# Patient Record
Sex: MALE | Race: WHITE | NOT HISPANIC OR LATINO | Employment: FULL TIME | URBAN - METROPOLITAN AREA
[De-identification: names, ages, dates, MRNs, and addresses within clinical notes are randomized per-mention and may not be internally consistent; named-entity substitution may affect disease eponyms.]

---

## 2017-02-06 RX ORDER — CITALOPRAM 20 MG/1
TABLET, FILM COATED ORAL
Qty: 90 TABLET | Refills: 3 | OUTPATIENT
Start: 2017-02-06

## 2017-02-06 RX ORDER — METOPROLOL SUCCINATE 50 MG/1
TABLET, EXTENDED RELEASE ORAL
Qty: 90 TABLET | Refills: 3 | OUTPATIENT
Start: 2017-02-06

## 2017-02-17 DIAGNOSIS — Z00.00 ROUTINE GENERAL MEDICAL EXAMINATION AT A HEALTH CARE FACILITY: Primary | ICD-10-CM

## 2017-02-28 RX ORDER — METOPROLOL SUCCINATE 50 MG/1
TABLET, EXTENDED RELEASE ORAL
Qty: 90 TABLET | Refills: 3 | Status: SHIPPED | OUTPATIENT
Start: 2017-02-28 | End: 2018-02-12 | Stop reason: SDUPTHER

## 2017-03-06 ENCOUNTER — CLINICAL SUPPORT (OUTPATIENT)
Dept: REHABILITATION | Facility: HOSPITAL | Age: 57
End: 2017-03-06
Attending: INTERNAL MEDICINE
Payer: COMMERCIAL

## 2017-03-06 ENCOUNTER — CLINICAL SUPPORT (OUTPATIENT)
Dept: INTERNAL MEDICINE | Facility: CLINIC | Age: 57
End: 2017-03-06

## 2017-03-06 ENCOUNTER — CLINICAL SUPPORT (OUTPATIENT)
Dept: CARDIOLOGY | Facility: CLINIC | Age: 57
End: 2017-03-06
Payer: COMMERCIAL

## 2017-03-06 ENCOUNTER — OFFICE VISIT (OUTPATIENT)
Dept: INTERNAL MEDICINE | Facility: CLINIC | Age: 57
End: 2017-03-06
Payer: COMMERCIAL

## 2017-03-06 VITALS
HEART RATE: 72 BPM | DIASTOLIC BLOOD PRESSURE: 80 MMHG | RESPIRATION RATE: 14 BRPM | SYSTOLIC BLOOD PRESSURE: 128 MMHG | BODY MASS INDEX: 24.25 KG/M2 | WEIGHT: 183 LBS | HEIGHT: 73 IN

## 2017-03-06 VITALS
DIASTOLIC BLOOD PRESSURE: 80 MMHG | HEIGHT: 73 IN | TEMPERATURE: 98 F | SYSTOLIC BLOOD PRESSURE: 128 MMHG | RESPIRATION RATE: 14 BRPM | WEIGHT: 183 LBS | BODY MASS INDEX: 24.25 KG/M2 | HEART RATE: 72 BPM

## 2017-03-06 DIAGNOSIS — I10 ESSENTIAL HYPERTENSION: Chronic | ICD-10-CM

## 2017-03-06 DIAGNOSIS — Z00.00 ROUTINE GENERAL MEDICAL EXAMINATION AT A HEALTH CARE FACILITY: Primary | ICD-10-CM

## 2017-03-06 DIAGNOSIS — Z00.00 ROUTINE GENERAL MEDICAL EXAMINATION AT A HEALTH CARE FACILITY: ICD-10-CM

## 2017-03-06 DIAGNOSIS — I25.84 CORONARY ARTERY DISEASE DUE TO CALCIFIED CORONARY LESION: ICD-10-CM

## 2017-03-06 DIAGNOSIS — I25.10 CORONARY ARTERY DISEASE DUE TO CALCIFIED CORONARY LESION: ICD-10-CM

## 2017-03-06 DIAGNOSIS — J98.01 BRONCHOSPASM: ICD-10-CM

## 2017-03-06 DIAGNOSIS — F41.9 ANXIETY: ICD-10-CM

## 2017-03-06 LAB
ALBUMIN SERPL BCP-MCNC: 4 G/DL
ALP SERPL-CCNC: 57 U/L
ALT SERPL W/O P-5'-P-CCNC: 29 U/L
ANION GAP SERPL CALC-SCNC: 10 MMOL/L
AST SERPL-CCNC: 23 U/L
BILIRUB SERPL-MCNC: 0.9 MG/DL
BUN SERPL-MCNC: 17 MG/DL
CALCIUM SERPL-MCNC: 9.3 MG/DL
CHLORIDE SERPL-SCNC: 107 MMOL/L
CHOLEST/HDLC SERPL: 3.3 {RATIO}
CO2 SERPL-SCNC: 27 MMOL/L
COMPLEXED PSA SERPL-MCNC: 0.71 NG/ML
CREAT SERPL-MCNC: 1 MG/DL
ERYTHROCYTE [DISTWIDTH] IN BLOOD BY AUTOMATED COUNT: 11.8 %
EST. GFR  (AFRICAN AMERICAN): >60 ML/MIN/1.73 M^2
EST. GFR  (NON AFRICAN AMERICAN): >60 ML/MIN/1.73 M^2
GLUCOSE SERPL-MCNC: 90 MG/DL
HCT VFR BLD AUTO: 39.7 %
HDL/CHOLESTEROL RATIO: 30.4 %
HDLC SERPL-MCNC: 102 MG/DL
HDLC SERPL-MCNC: 31 MG/DL
HGB BLD-MCNC: 13.7 G/DL
LDLC SERPL CALC-MCNC: 58 MG/DL
MCH RBC QN AUTO: 32.5 PG
MCHC RBC AUTO-ENTMCNC: 34.5 %
MCV RBC AUTO: 94 FL
NONHDLC SERPL-MCNC: 71 MG/DL
PLATELET # BLD AUTO: 210 K/UL
PMV BLD AUTO: 9.8 FL
POTASSIUM SERPL-SCNC: 4.1 MMOL/L
PROT SERPL-MCNC: 6.9 G/DL
RBC # BLD AUTO: 4.22 M/UL
SODIUM SERPL-SCNC: 144 MMOL/L
TRIGL SERPL-MCNC: 65 MG/DL
TSH SERPL DL<=0.005 MIU/L-ACNC: 1.22 UIU/ML
WBC # BLD AUTO: 3.37 K/UL

## 2017-03-06 PROCEDURE — 99396 PREV VISIT EST AGE 40-64: CPT | Mod: S$GLB,,, | Performed by: INTERNAL MEDICINE

## 2017-03-06 PROCEDURE — 85027 COMPLETE CBC AUTOMATED: CPT | Mod: PO

## 2017-03-06 PROCEDURE — 80061 LIPID PANEL: CPT | Mod: PO

## 2017-03-06 PROCEDURE — 84153 ASSAY OF PSA TOTAL: CPT

## 2017-03-06 PROCEDURE — 97750 PHYSICAL PERFORMANCE TEST: CPT | Performed by: PHYSICAL THERAPIST

## 2017-03-06 PROCEDURE — 3079F DIAST BP 80-89 MM HG: CPT | Mod: S$GLB,,, | Performed by: INTERNAL MEDICINE

## 2017-03-06 PROCEDURE — 3074F SYST BP LT 130 MM HG: CPT | Mod: S$GLB,,, | Performed by: INTERNAL MEDICINE

## 2017-03-06 PROCEDURE — 93000 ELECTROCARDIOGRAM COMPLETE: CPT | Mod: S$GLB,,, | Performed by: NUCLEAR MEDICINE

## 2017-03-06 PROCEDURE — 83036 HEMOGLOBIN GLYCOSYLATED A1C: CPT

## 2017-03-06 PROCEDURE — 99999 PR PBB SHADOW E&M-EST. PATIENT-LVL III: CPT | Mod: PBBFAC,,, | Performed by: INTERNAL MEDICINE

## 2017-03-06 PROCEDURE — 80053 COMPREHEN METABOLIC PANEL: CPT | Mod: PO

## 2017-03-06 PROCEDURE — 84443 ASSAY THYROID STIM HORMONE: CPT

## 2017-03-06 RX ORDER — CITALOPRAM 20 MG/1
20 TABLET, FILM COATED ORAL DAILY
Qty: 90 TABLET | Refills: 3 | Status: SHIPPED | OUTPATIENT
Start: 2017-03-06 | End: 2018-05-23 | Stop reason: SDUPTHER

## 2017-03-06 RX ORDER — AMOXICILLIN 500 MG/1
CAPSULE ORAL
Refills: 0 | COMMUNITY
Start: 2017-02-08 | End: 2017-03-06 | Stop reason: ALTCHOICE

## 2017-03-06 RX ORDER — ALBUTEROL SULFATE 90 UG/1
2 AEROSOL, METERED RESPIRATORY (INHALATION) EVERY 6 HOURS PRN
Qty: 1 EACH | Refills: 11 | Status: SHIPPED | OUTPATIENT
Start: 2017-03-06 | End: 2019-09-30 | Stop reason: SDUPTHER

## 2017-03-06 RX ORDER — CLONAZEPAM 0.5 MG/1
0.5 TABLET ORAL 2 TIMES DAILY PRN
Qty: 30 TABLET | Refills: 0 | Status: SHIPPED | OUTPATIENT
Start: 2017-03-06 | End: 2019-09-30 | Stop reason: SDUPTHER

## 2017-03-06 RX ORDER — HYDROCODONE BITARTRATE AND ACETAMINOPHEN 5; 325 MG/1; MG/1
TABLET ORAL
Refills: 0 | COMMUNITY
Start: 2017-02-08 | End: 2017-03-06

## 2017-03-06 NOTE — PROGRESS NOTES
Subjective:      Patient ID: Hernan Fields is a 56 y.o. male.    Chief Complaint: Executive Health    HPI Comments: It was a pleasure to see you again for your Executive Health  Physical on 2016. 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     Medications     albuterol 90 mcg/actuation inhaler 2 puff, Every 6 hours PRN       aspirin (ASPIRIN LOW DOSE) 81 MG EC tablet        atorvastatin (LIPITOR) 80 MG tablet 80 mg, Nightly       citalopram (CELEXA) 20 MG tablet        clonazePAM (KLONOPIN) 0.5 MG tablet 0.5 mg, 2 times daily PRN       clopidogrel (PLAVIX) 75 mg tablet 75 mg, Daily        metoprolol succinate (TOPROL-XL) 50 MG 24 hr tablet        ranolazine (RANEXA) 500 MG Tb12 500 mg, 2 times daily       valacyclovir (VALTREX) 500 MG tablet 1 tablet, Daily       VIAGRA 100 mg tablet        Takes albuterol only with exercise  Takes 0.25-0.5 klonopin about 6 times yearly.     You are allergic  to iodine and iodine products.     Your primary care physician is Dr. Cosby. Your  neurologist is Dr. Mina. Your cardiologist is Dr. Agustin . You work at  [employer] as a  and you are a former smoker quit in . occ etoh.     Your past surgical history includes right orbital  fracture surgery, hernia repair, tonsillectomy,  cholecystectomy in May 2013, [facility], and prostate biopsy.     Your family history includes a father recently  with  COPD and  hypertension. Your mom is  at age 53 with a  brain aneurysm.     HEALTH MAINTENANCE: tetanus vaccines are  up-to-date. You had a colonoscopy 2013 and your  repeat is due in .    Review of Systems   Constitutional:  "Negative for chills and fever.   HENT: Negative for ear pain and sore throat.    Respiratory: Negative for cough, shortness of breath and wheezing.    Cardiovascular: Negative for chest pain.   Gastrointestinal: Negative for abdominal pain and blood in stool.   Genitourinary: Negative for dysuria and hematuria.   Neurological: Negative for seizures and syncope.     Objective:   /80  Pulse 72  Temp 97.5 °F (36.4 °C) (Tympanic)   Resp 14  Ht 6' 1" (1.854 m)  Wt 83 kg (182 lb 15.7 oz)  BMI 24.14 kg/m2    Physical Exam   Constitutional: He is oriented to person, place, and time. He appears well-developed and well-nourished. No distress.   HENT:   Head: Normocephalic and atraumatic.   Mouth/Throat: Oropharynx is clear and moist.   Eyes: EOM are normal. Pupils are equal, round, and reactive to light.   Neck: Neck supple. Carotid bruit is not present. No thyromegaly present.   Cardiovascular: Normal rate and regular rhythm.    Pulmonary/Chest: Breath sounds normal. He has no wheezes. He has no rales.   Abdominal: Soft. Bowel sounds are normal. There is no tenderness.   Musculoskeletal: He exhibits no edema.   Lymphadenopathy:     He has no cervical adenopathy.   Neurological: He is alert and oriented to person, place, and time.   Skin: Skin is warm and dry.   Psychiatric: He has a normal mood and affect. His behavior is normal.     Clinical Support on 03/06/2017   Component Date Value Ref Range Status    Sodium 03/06/2017 144  136 - 145 mmol/L Final    Potassium 03/06/2017 4.1  3.5 - 5.1 mmol/L Final    Chloride 03/06/2017 107  95 - 110 mmol/L Final    CO2 03/06/2017 27  23 - 29 mmol/L Final    Glucose 03/06/2017 90  70 - 110 mg/dL Final    BUN, Bld 03/06/2017 17  6 - 20 mg/dL Final    Creatinine 03/06/2017 1.0  0.5 - 1.4 mg/dL Final    Calcium 03/06/2017 9.3  8.7 - 10.5 mg/dL Final    Total Protein 03/06/2017 6.9  6.0 - 8.4 g/dL Final    Albumin 03/06/2017 4.0  3.5 - 5.2 g/dL Final    Total " Bilirubin 03/06/2017 0.9  0.1 - 1.0 mg/dL Final    Comment: For infants and newborns, interpretation of results should be based  on gestational age, weight and in agreement with clinical  observations.  Premature Infant recommended reference ranges:  Up to 24 hours.............<8.0 mg/dL  Up to 48 hours............<12.0 mg/dL  3-5 days..................<15.0 mg/dL  6-29 days.................<15.0 mg/dL      Alkaline Phosphatase 03/06/2017 57  55 - 135 U/L Final    AST 03/06/2017 23  10 - 40 U/L Final    ALT 03/06/2017 29  10 - 44 U/L Final    Anion Gap 03/06/2017 10  8 - 16 mmol/L Final    eGFR if African American 03/06/2017 >60  >60 mL/min/1.73 m^2 Final    eGFR if non African American 03/06/2017 >60  >60 mL/min/1.73 m^2 Final    Comment: Calculation used to obtain the estimated glomerular filtration  rate (eGFR) is the CKD-EPI equation. Since race is unknown   in our information system, the eGFR values for   -American and Non--American patients are given   for each creatinine result.      WBC 03/06/2017 3.37* 3.90 - 12.70 K/uL Final    RBC 03/06/2017 4.22* 4.60 - 6.20 M/uL Final    Hemoglobin 03/06/2017 13.7* 14.0 - 18.0 g/dL Final    Hematocrit 03/06/2017 39.7* 40.0 - 54.0 % Final    MCV 03/06/2017 94  82 - 98 fL Final    MCH 03/06/2017 32.5* 27.0 - 31.0 pg Final    MCHC 03/06/2017 34.5  32.0 - 36.0 % Final    RDW 03/06/2017 11.8  11.5 - 14.5 % Final    Platelets 03/06/2017 210  150 - 350 K/uL Final    MPV 03/06/2017 9.8  9.2 - 12.9 fL Final    Cholesterol 03/06/2017 102* 120 - 199 mg/dL Final    Comment: The National Cholesterol Education Program (NCEP) has set the  following guidelines (reference ranges) for Cholesterol:  Optimal.....................<200 mg/dL  Borderline High.............200-239 mg/dL  High........................> or = 240 mg/dL      Triglycerides 03/06/2017 65  30 - 150 mg/dL Final    Comment: The National Cholesterol Education Program (NCEP) has set  the  following guidelines (reference values) for triglycerides:  Normal......................<150 mg/dL  Borderline High.............150-199 mg/dL  High........................200-499 mg/dL      HDL 03/06/2017 31* 40 - 75 mg/dL Final    Comment: The National Cholesterol Education Program (NCEP) has set the  following guidelines (reference values) for HDL Cholesterol:  Low...............<40 mg/dL  Optimal...........>60 mg/dL      LDL Cholesterol 03/06/2017 58.0* 63.0 - 159.0 mg/dL Final    Comment: The National Cholesterol Education Program (NCEP) has set the  following guidelines (reference values) for LDL Cholesterol:  Optimal.......................<130 mg/dL  Borderline High...............130-159 mg/dL  High..........................160-189 mg/dL  Very High.....................>190 mg/dL      HDL/Chol Ratio 03/06/2017 30.4  20.0 - 50.0 % Final    Total Cholesterol/HDL Ratio 03/06/2017 3.3  2.0 - 5.0 Final    Non-HDL Cholesterol 03/06/2017 71  mg/dL Final    Comment: Risk category and Non-HDL cholesterol goals:  Coronary heart disease (CHD)or equivalent (10-year risk of CHD >20%):  Non-HDL cholesterol goal     <130 mg/dL  Two or more CHD risk factors and 10-year risk of CHD <= 20%:  Non-HDL cholesterol goal     <160 mg/dL  0 to 1 CHD risk factor:  Non-HDL cholesterol goal     <190 mg/dL      TSH 03/06/2017 1.215  0.400 - 4.000 uIU/mL Final    PSA, SCREEN 03/06/2017 0.71  0.00 - 4.00 ng/mL Final    Comment: PSA Expected levels:  Hormonal Therapy: <0.05 ng/ml  Prostatectomy: <0.01 ng/ml  Radiation Therapy: <1.00 ng/ml      Hemoglobin A1C 03/06/2017 5.0  4.5 - 6.2 % Final    Comment: According to ADA guidelines, hemoglobin A1C <7.0% represents  optimal control in non-pregnant diabetic patients.  Different  metrics may apply to specific populations.   Standards of Medical Care in Diabetes - 2016.  For the purpose of screening for the presence of diabetes:  <5.7%     Consistent with the absence of  diabetes  5.7-6.4%  Consistent with increasing risk for diabetes   (prediabetes)  >or=6.5%  Consistent with diabetes  Currently no consensus exists for use of hemoglobin A1C  for diagnosis of diabetes for children.      Estimated Avg Glucose 03/06/2017 97  68 - 131 mg/dL Final       Assessment:     1. Routine general medical examination at a health care facility    2. Essential hypertension    3. Coronary artery disease due to calcified coronary lesion    4. Bronchospasm    5. Anxiety      Plan:   Routine general medical examination at a health care facility    Essential hypertension  Comments:  controlled    Coronary artery disease due to calcified coronary lesion  Comments:  stable    Bronchospasm  Comments:  only with exercise  alb helps  Orders:  -     albuterol 90 mcg/actuation inhaler; Inhale 2 puffs into the lungs every 6 (six) hours as needed for Wheezing.  Dispense: 1 each; Refill: 11    Anxiety  Comments:  stable  Orders:  -     citalopram (CELEXA) 20 MG tablet; Take 1 tablet (20 mg total) by mouth once daily.  Dispense: 90 tablet; Refill: 3  -     clonazePAM (KLONOPIN) 0.5 MG tablet; Take 1 tablet (0.5 mg total) by mouth 2 (two) times daily as needed for Anxiety.  Dispense: 30 tablet; Refill: 0     heart healthy diet and regular exercise  See executive health letter for details    Lab Frequency Next Occurrence   Comprehensive metabolic panel Once 3/10/2017   Lipid panel Once 3/10/2017   Comprehensive metabolic panel Once 3/12/2017   Lipid panel Once 3/12/2017   High sensitivity CRP (Cardiac CRP) Once 3/12/2017         Return in about 6 months (around 9/6/2017), or if symptoms worsen or fail to improve.

## 2017-03-06 NOTE — PROGRESS NOTES
Hernan Fields  :   2017    DX: v70.0    Orders:  Fitness Evaluation    An Executive Health Fitness Component evaluation was completed.  Results are as follows:    Height (in):    73                  Weight (lbs):     183                    BMI:   24.2    Resting Energy Expenditure:     1530  Kcal/24 hours       Estimated:  1729      REE measured post treadmill:    no     REE measured fasting:      Yes      Body Composition:   19 % body fat         Rating: excellent      Waist to Hip Ratio: 0.86        Risk: low     Hip taken over clothing:      yes      Muscular Strength and Endurance Assessment:                                 Strength (lbs):          Right: 127.7        Rating: average         Left:  108.3       Rating:  average   Push ups:  NT             Patient declined due to a sprained toe   Curl ups:   NT              Patient declined    Flexibility Testing:             Sit and Reach (cm):    20            Rating:  Fair                               The patient tolerated the testing procedures well.

## 2017-03-07 LAB
ESTIMATED AVG GLUCOSE: 97 MG/DL
HBA1C MFR BLD HPLC: 5 %

## 2017-03-22 ENCOUNTER — PATIENT MESSAGE (OUTPATIENT)
Dept: RESEARCH | Facility: HOSPITAL | Age: 57
End: 2017-03-22

## 2017-04-10 ENCOUNTER — OFFICE VISIT (OUTPATIENT)
Dept: CARDIOLOGY | Facility: CLINIC | Age: 57
End: 2017-04-10
Payer: COMMERCIAL

## 2017-04-10 VITALS
BODY MASS INDEX: 24.83 KG/M2 | HEIGHT: 73 IN | DIASTOLIC BLOOD PRESSURE: 72 MMHG | HEART RATE: 68 BPM | WEIGHT: 187.38 LBS | SYSTOLIC BLOOD PRESSURE: 104 MMHG

## 2017-04-10 DIAGNOSIS — I20.89 ATYPICAL ANGINA: ICD-10-CM

## 2017-04-10 DIAGNOSIS — R07.89 ATYPICAL CHEST PAIN: ICD-10-CM

## 2017-04-10 DIAGNOSIS — I25.83 CORONARY ARTERY DISEASE DUE TO LIPID RICH PLAQUE: ICD-10-CM

## 2017-04-10 DIAGNOSIS — Z98.61 HISTORY OF PTCA: ICD-10-CM

## 2017-04-10 DIAGNOSIS — I25.10 CORONARY ARTERY DISEASE DUE TO LIPID RICH PLAQUE: ICD-10-CM

## 2017-04-10 DIAGNOSIS — E78.2 MIXED HYPERLIPIDEMIA: Primary | ICD-10-CM

## 2017-04-10 DIAGNOSIS — R93.1 AGATSTON CORONARY ARTERY CALCIUM SCORE GREATER THAN 400: ICD-10-CM

## 2017-04-10 DIAGNOSIS — I10 ESSENTIAL HYPERTENSION: Chronic | ICD-10-CM

## 2017-04-10 PROCEDURE — 1160F RVW MEDS BY RX/DR IN RCRD: CPT | Mod: S$GLB,,, | Performed by: INTERNAL MEDICINE

## 2017-04-10 PROCEDURE — 99213 OFFICE O/P EST LOW 20 MIN: CPT | Mod: S$GLB,,, | Performed by: INTERNAL MEDICINE

## 2017-04-10 PROCEDURE — 99999 PR PBB SHADOW E&M-EST. PATIENT-LVL II: CPT | Mod: PBBFAC,,, | Performed by: INTERNAL MEDICINE

## 2017-04-10 PROCEDURE — 3074F SYST BP LT 130 MM HG: CPT | Mod: S$GLB,,, | Performed by: INTERNAL MEDICINE

## 2017-04-10 PROCEDURE — 3078F DIAST BP <80 MM HG: CPT | Mod: S$GLB,,, | Performed by: INTERNAL MEDICINE

## 2017-04-10 RX ORDER — RANOLAZINE 500 MG/1
500 TABLET, EXTENDED RELEASE ORAL 2 TIMES DAILY
Qty: 180 TABLET | Refills: 3 | Status: SHIPPED | OUTPATIENT
Start: 2017-04-10 | End: 2018-06-14 | Stop reason: SDUPTHER

## 2017-04-10 NOTE — MR AVS SNAPSHOT
[facility] - Cardiology  [address] [facility] [place] [address]  Phone: [phone]  Fax: [phone]                  Hernan Fields   4/10/2017 2:40 PM   Office Visit    Description:  Male : 1960   Provider:  Umer Agustin MD   Department:  [facility] - Cardiology           Reason for Visit     Coronary Artery Disease     Hypertension     Hyperlipidemia           Diagnoses this Visit        Comments    Mixed hyperlipidemia    -  Primary     Coronary artery disease due to lipid rich plaque         Essential hypertension         History of PTCA         Agatston coronary artery calcium score greater than 400         Atypical angina         Atypical chest pain                To Do List           Goals (5 Years of Data)     None      Follow-Up and Disposition     Return in about 6 months (around 10/10/2017).    Follow-up and Disposition History       These Medications        Disp Refills Start End    ranolazine (RANEXA) 500 MG Tb12 180 tablet 3 4/10/2017     Take 1 tablet (500 mg total) by mouth 2 (two) times daily. - Oral    Pharmacy: [facility]/pharm[facility] #[id number] - [place] - [address] [address] [facility] [place] #: [phone]         [facility]     [facility] Nurse Care Line -  Assistance  Unless otherwise directed by your provider, please contact [facility], our nurse care line that is available for  assistance.     Registered nurses in the [facility] provide: appointment scheduling, clinical advisement, health education, and other advisory services.  Call: [phone] (toll free)               Medications           Message regarding Medications     Verify the changes and/or additions to your medication regime listed below are the same as discussed with your clinician today.  If any of these changes or additions are incorrect, please notify your healthcare provider.             Verify that the below list of medications is an accurate representation of the medications you are  "currently taking.  If none reported, the list may be blank. If incorrect, please contact your healthcare provider. Carry this list with you in case of emergency.           Current Medications     albuterol 90 mcg/actuation inhaler Inhale 2 puffs into the lungs every 6 (six) hours as needed for Wheezing.    aspirin (ASPIRIN LOW DOSE) 81 MG EC tablet Take by mouth. 1 Tablet, Delayed Release (E.C.) Oral Every day    atorvastatin (LIPITOR) 80 MG tablet Take 1 tablet (80 mg total) by mouth every evening.    citalopram (CELEXA) 20 MG tablet Take 1 tablet (20 mg total) by mouth once daily.    clonazePAM (KLONOPIN) 0.5 MG tablet Take 1 tablet (0.5 mg total) by mouth 2 (two) times daily as needed for Anxiety.    clopidogrel (PLAVIX) 75 mg tablet Take 1 tablet (75 mg total) by mouth once daily.    metoprolol succinate (TOPROL-XL) 50 MG 24 hr tablet TAKE ONE TABLET BY MOUTH ONCE DAILY    ranolazine (RANEXA) 500 MG Tb12 Take 1 tablet (500 mg total) by mouth 2 (two) times daily.    valacyclovir (VALTREX) 500 MG tablet Take 1 tablet by mouth once daily.    VIAGRA 100 mg tablet TAKE 1 TABLET BY MOUTH 20 MINUTES BEFORE SEXUAL ACTIVITY           Clinical Reference Information           Your Vitals Were     BP Pulse Height Weight BMI    104/72 (BP Location: Left arm, Patient Position: Sitting) 68 6' 1" (1.854 m) 85 kg (187 lb 6.3 oz) 24.72 kg/m2      Blood Pressure          Most Recent Value    Right Arm BP - Sitting  116/64    Left Arm BP - Sitting  104/72    BP  104/72      Allergies as of 4/10/2017     Iodine And Iodide Containing Products    Iodine      Immunizations Administered on Date of Encounter - 4/10/2017     None      Language Assistance Services     ATTENTION: Language assistance services are available, free of charge. Please call [phone].      ATENCIÓN: Si suma wu, tiene a catherine disposición servicios gratuitos de asistencia lingüística. Llame al [phone].     CHÚ Ý: N?u b?n nói Ti?ng Vi?t, có các d?ch v? " h? tr? ngôn ng? mi?n phí dành cho b?n. G?i s? [phone].         [facility] complies with applicable Federal civil rights laws and does not discriminate on the basis of race, color, national origin, age, disability, or sex.

## 2017-04-10 NOTE — PROGRESS NOTES
"Subjective:    Patient ID:  Hernan Fields is a 56 y.o. male who presents for evaluation of Coronary Artery Disease; Hypertension; and Hyperlipidemia      HPI Mr. Fields returns for f/u.   His current medical conditions include HCV ab +, CAD, HTN. Nonsmoker. + family h/o CAD.   H/o abnormal calcium score 1087 in 2014.   Has chronic atypical cp sxs  S/p PCI RCA NADINE x 2 June 2016; calcified nonobstructive LAD/left main disease as well, normal LVEF.  Seems to be doing ok overall.  Has chronic atypical cp for years, unchanged in character/frequency.  He has no typical anginal sxs.  No cp sxs with exercise.  Lipids and HTN well controlled on current meds.  Mindful of diet, not as active with exercise.    Review of Systems   Constitution: Negative.   HENT: Negative.    Eyes: Negative.    Cardiovascular: Positive for chest pain.   Respiratory: Negative.    Endocrine: Negative.    Hematologic/Lymphatic: Negative.    Skin: Negative.    Musculoskeletal: Negative.    Gastrointestinal: Negative.    Genitourinary: Negative.    Neurological: Negative.    Psychiatric/Behavioral: Negative.    Allergic/Immunologic: Negative.        /72 (BP Location: Left arm, Patient Position: Sitting)  Pulse 68  Ht 6' 1" (1.854 m)  Wt 85 kg (187 lb 6.3 oz)  BMI 24.72 kg/m2    Wt Readings from Last 3 Encounters:   04/10/17 85 kg (187 lb 6.3 oz)   03/06/17 83 kg (182 lb 15.7 oz)   03/06/17 83 kg (182 lb 15.7 oz)     Temp Readings from Last 3 Encounters:   03/06/17 97.5 °F (36.4 °C) (Tympanic)   06/02/16 97.8 °F (36.6 °C) (Oral)   01/29/16 (!) 95.9 °F (35.5 °C)     BP Readings from Last 3 Encounters:   04/10/17 104/72   03/06/17 128/80   03/06/17 128/80     Pulse Readings from Last 3 Encounters:   04/10/17 68   03/06/17 72   03/06/17 72          Objective:    Physical Exam   Constitutional: He is oriented to person, place, and time. He appears well-developed and well-nourished.   HENT:   Head: Normocephalic.   Neck: Normal range of motion. " Neck supple. Normal carotid pulses and no JVD present. Carotid bruit is not present.   Cardiovascular: Normal rate, regular rhythm, S1 normal and S2 normal.  PMI is not displaced.  Exam reveals no S3, no S4, no distant heart sounds, no friction rub, no midsystolic click and no opening snap.    No murmur heard.  Pulses:       Radial pulses are 2+ on the right side, and 2+ on the left side.   Pulmonary/Chest: Effort normal and breath sounds normal. He has no wheezes. He has no rales.   Abdominal: Soft. Bowel sounds are normal. He exhibits no distension and no abdominal bruit. There is no tenderness.   Musculoskeletal: He exhibits no edema.   Neurological: He is alert and oriented to person, place, and time.   Skin: Skin is warm.   Psychiatric: He has a normal mood and affect. His behavior is normal.   Nursing note and vitals reviewed.      I have reviewed all pertinent labs and cardiac studies.      Chemistry        Component Value Date/Time     03/06/2017 0747    K 4.1 03/06/2017 0747     03/06/2017 0747    CO2 27 03/06/2017 0747    BUN 17 03/06/2017 0747    CREATININE 1.0 03/06/2017 0747    GLU 90 03/06/2017 0747        Component Value Date/Time    CALCIUM 9.3 03/06/2017 0747    ALKPHOS 57 03/06/2017 0747    AST 23 03/06/2017 0747    ALT 29 03/06/2017 0747    BILITOT 0.9 03/06/2017 0747          Lab Results   Component Value Date    WBC 3.37 (L) 03/06/2017    HGB 13.7 (L) 03/06/2017    HCT 39.7 (L) 03/06/2017    MCV 94 03/06/2017     03/06/2017     Lab Results   Component Value Date    HGBA1C 5.0 03/06/2017     Lab Results   Component Value Date    CHOL 102 (L) 03/06/2017    CHOL 125 01/29/2016    CHOL 125 01/08/2016     Lab Results   Component Value Date    HDL 31 (L) 03/06/2017    HDL 37 (L) 01/29/2016    HDL 40 01/08/2016     Lab Results   Component Value Date    LDLCALC 58.0 (L) 03/06/2017    LDLCALC 75.0 01/29/2016    LDLCALC 74.8 01/08/2016     Lab Results   Component Value Date    TRIG 65  03/06/2017    TRIG 65 01/29/2016    TRIG 51 01/08/2016     Lab Results   Component Value Date    CHOLHDL 30.4 03/06/2017    CHOLHDL 29.6 01/29/2016    CHOLHDL 32.0 01/08/2016           Assessment:       Stable CV conditions/CAD on current medical tx.  Chronic atypical cp for years, not likely angina.    1. Mixed hyperlipidemia    2. Coronary artery disease due to lipid rich plaque    3. Essential hypertension    4. History of PTCA    5. Agatston coronary artery calcium score greater than 400    6. Atypical angina    7. Atypical chest pain         Plan:             Continue optimal medical tx for CAD.  Cardiac diet  Daily exercise  No changes to meds today.  F/u 6 months.

## 2017-05-14 RX ORDER — CLOPIDOGREL BISULFATE 75 MG/1
TABLET ORAL
Qty: 90 TABLET | Refills: 3 | Status: SHIPPED | OUTPATIENT
Start: 2017-05-14 | End: 2018-05-01 | Stop reason: ALTCHOICE

## 2017-06-25 RX ORDER — ATORVASTATIN CALCIUM 80 MG/1
80 TABLET, FILM COATED ORAL NIGHTLY
Qty: 30 TABLET | Refills: 3 | Status: SHIPPED | OUTPATIENT
Start: 2017-06-25 | End: 2017-07-25 | Stop reason: SDUPTHER

## 2017-07-25 ENCOUNTER — OFFICE VISIT (OUTPATIENT)
Dept: INTERNAL MEDICINE | Facility: CLINIC | Age: 57
End: 2017-07-25
Payer: COMMERCIAL

## 2017-07-25 VITALS
BODY MASS INDEX: 24.08 KG/M2 | OXYGEN SATURATION: 98 % | SYSTOLIC BLOOD PRESSURE: 120 MMHG | HEIGHT: 73 IN | DIASTOLIC BLOOD PRESSURE: 82 MMHG | TEMPERATURE: 97 F | HEART RATE: 66 BPM | WEIGHT: 181.69 LBS

## 2017-07-25 DIAGNOSIS — R89.4 POSITIVE TEST FOR HERPES SIMPLEX VIRUS (HSV) ANTIBODY: ICD-10-CM

## 2017-07-25 DIAGNOSIS — R53.83 TIREDNESS: Primary | ICD-10-CM

## 2017-07-25 PROCEDURE — 99214 OFFICE O/P EST MOD 30 MIN: CPT | Mod: S$GLB,,, | Performed by: INTERNAL MEDICINE

## 2017-07-25 PROCEDURE — 99999 PR PBB SHADOW E&M-EST. PATIENT-LVL III: CPT | Mod: PBBFAC,,, | Performed by: INTERNAL MEDICINE

## 2017-07-25 NOTE — PROGRESS NOTES
"Subjective:      Patient ID: Hernan Fields is a 57 y.o. male.    Chief Complaint: Medication Refill and Fatigue    56 yo with Patient Active Problem List:     HTN (hypertension)     Anxiety     Hepatitis C antibody test positive     Agatston coronary artery calcium score greater than 400     Atypical chest pain     Mixed hyperlipidemia     False positive serological test for hepatitis C     Coronary artery disease     History of PTCA    Here today requesting stop celexa.  Tired for years. Feels began around time he started celexa. Wants to stop celexa due to thinking may be making him tired. Has been on celexa for 20 years, initiated for diff with public speaking. Feels he is doing well today. No h/o psyc admission. Currently takes klonopin about 1/2 per month, usually with flying. On valtrex daily for 2 years.  He reports that he has been receiving this from an online doctor.  He reports that a previous girlfriend notified him that she had herpes approximately 2 years ago.  He has never had any outbreaks.  He was advised by his previous physician not to begin Valtrex and to monitor for outbreaks.  He is requesting Valtrex prescription today.      Review of Systems   Constitutional: Negative for chills and fever.   HENT: Negative for ear pain and sore throat.    Eyes: Negative for visual disturbance.   Respiratory: Negative for cough.    Cardiovascular: Negative for chest pain.   Gastrointestinal: Negative for abdominal pain and blood in stool.   Endocrine:        No decrease in libido   Genitourinary: Negative for discharge, dysuria, hematuria, penile pain and penile swelling.   Skin: Negative for rash and wound.   Neurological: Negative for seizures and syncope.     Objective:   /82 (BP Location: Right arm, Patient Position: Sitting)   Pulse 66   Temp 97 °F (36.1 °C) (Tympanic)   Ht 6' 1" (1.854 m)   Wt 82.4 kg (181 lb 10.5 oz)   SpO2 98%   BMI 23.97 kg/m²     Physical Exam   Constitutional: He is " oriented to person, place, and time. He appears well-developed and well-nourished. No distress.   HENT:   Head: Normocephalic and atraumatic.   Mouth/Throat: Oropharynx is clear and moist.   Eyes: EOM are normal. Pupils are equal, round, and reactive to light.   Neck: Neck supple. No thyromegaly present.   Cardiovascular: Normal rate and regular rhythm.    Pulmonary/Chest: Breath sounds normal. He has no wheezes. He has no rales.   Abdominal: Soft. Bowel sounds are normal. There is no tenderness.   Lymphadenopathy:     He has no cervical adenopathy.   Neurological: He is alert and oriented to person, place, and time.   Skin: Skin is warm and dry.   Psychiatric: He has a normal mood and affect. His behavior is normal.       Assessment:     1. Tiredness    2. Positive test for herpes simplex virus (HSV) antibody      Plan:   Tiredness  Will try decrease or stop Celexa prior to any further workup as he has had normal basic lab testing within the past 6 months.    Positive test for herpes simplex virus (HSV) antibody  HSV-2 IgG positive approximately 2 years ago  Without history of outbreak  Advise patient to use condoms with current partner and monitor for outbreak.    Greater than 20 minutes was spent with this patient over half of which was spent counseling and discussing transmission, symptoms, prevention, appropriate testing, shortcomings of antibody testing, discordant pairs, suppressive therapy, risks and benefits of Valtrex with regards to HSV-2.    Lab Frequency Next Occurrence   Comprehensive metabolic panel Once 03/12/2017   Lipid panel Once 10/17/2017   High sensitivity CRP (Cardiac CRP) Once 03/12/2017         Return if symptoms worsen or fail to improve.

## 2017-08-21 ENCOUNTER — PATIENT MESSAGE (OUTPATIENT)
Dept: CARDIOLOGY | Facility: CLINIC | Age: 57
End: 2017-08-21

## 2017-09-03 ENCOUNTER — OFFICE VISIT (OUTPATIENT)
Dept: URGENT CARE | Facility: CLINIC | Age: 57
End: 2017-09-03
Payer: COMMERCIAL

## 2017-09-03 ENCOUNTER — HOSPITAL ENCOUNTER (OUTPATIENT)
Dept: RADIOLOGY | Facility: HOSPITAL | Age: 57
Discharge: HOME | End: 2017-09-03
Attending: NURSE PRACTITIONER
Payer: COMMERCIAL

## 2017-09-03 VITALS
HEIGHT: 73 IN | DIASTOLIC BLOOD PRESSURE: 80 MMHG | TEMPERATURE: 100 F | WEIGHT: 177.94 LBS | SYSTOLIC BLOOD PRESSURE: 138 MMHG | BODY MASS INDEX: 23.58 KG/M2 | OXYGEN SATURATION: 97 % | HEART RATE: 95 BPM

## 2017-09-03 DIAGNOSIS — R50.9 FEVER, UNSPECIFIED FEVER CAUSE: ICD-10-CM

## 2017-09-03 DIAGNOSIS — R07.0 THROAT PAIN: ICD-10-CM

## 2017-09-03 DIAGNOSIS — R05.9 COUGH: ICD-10-CM

## 2017-09-03 DIAGNOSIS — J06.9 UPPER RESPIRATORY TRACT INFECTION, UNSPECIFIED TYPE: Primary | Chronic | ICD-10-CM

## 2017-09-03 LAB
CTP QC/QA: YES
CTP QC/QA: YES
FLUAV AG NPH QL: NEGATIVE
FLUBV AG NPH QL: NEGATIVE
S PYO RRNA THROAT QL PROBE: NEGATIVE

## 2017-09-03 PROCEDURE — 71020 XR CHEST PA AND LATERAL: CPT | Mod: TC,PO

## 2017-09-03 PROCEDURE — 3079F DIAST BP 80-89 MM HG: CPT | Mod: S$GLB,,, | Performed by: NURSE PRACTITIONER

## 2017-09-03 PROCEDURE — 3075F SYST BP GE 130 - 139MM HG: CPT | Mod: S$GLB,,, | Performed by: NURSE PRACTITIONER

## 2017-09-03 PROCEDURE — 87081 CULTURE SCREEN ONLY: CPT

## 2017-09-03 PROCEDURE — 87880 STREP A ASSAY W/OPTIC: CPT | Mod: QW,S$GLB,, | Performed by: NURSE PRACTITIONER

## 2017-09-03 PROCEDURE — 99999 PR PBB SHADOW E&M-EST. PATIENT-LVL IV: CPT | Mod: PBBFAC,,, | Performed by: NURSE PRACTITIONER

## 2017-09-03 PROCEDURE — 99214 OFFICE O/P EST MOD 30 MIN: CPT | Mod: S$GLB,,, | Performed by: NURSE PRACTITIONER

## 2017-09-03 PROCEDURE — 71020 XR CHEST PA AND LATERAL: CPT | Mod: 26,,, | Performed by: RADIOLOGY

## 2017-09-03 PROCEDURE — 3008F BODY MASS INDEX DOCD: CPT | Mod: S$GLB,,, | Performed by: NURSE PRACTITIONER

## 2017-09-03 PROCEDURE — 87804 INFLUENZA ASSAY W/OPTIC: CPT | Mod: QW,S$GLB,, | Performed by: NURSE PRACTITIONER

## 2017-09-03 RX ORDER — AMOXICILLIN AND CLAVULANATE POTASSIUM 875; 125 MG/1; MG/1
1 TABLET, FILM COATED ORAL 2 TIMES DAILY
Qty: 20 TABLET | Refills: 0 | Status: SHIPPED | OUTPATIENT
Start: 2017-09-03 | End: 2017-09-13

## 2017-09-03 RX ORDER — BENZONATATE 200 MG/1
200 CAPSULE ORAL 3 TIMES DAILY PRN
Qty: 30 CAPSULE | Refills: 0 | Status: SHIPPED | OUTPATIENT
Start: 2017-09-03 | End: 2018-01-29

## 2017-09-03 NOTE — PATIENT INSTRUCTIONS
Dysphagia Diet  A dysphagia diet is a special eating plan. Your health care provider may advise it if you have trouble swallowing (dysphagia).  Why a dysphagia diet is needed  When you have dysphagia, you are at risk for aspiration. Aspiration is when food or liquid enters the lungs by accident. It can cause pneumonia and other problems. The foods you eat can affect your ability to swallow. For example, soft foods are easier to swallow than hard foods. A dysphagia diet can help prevent aspiration. You may be at risk for aspiration from dysphagia if you have any of these medical conditions:  · Stroke  · Severe dental problems  · Conditions that lead to less saliva, such as Sjogren syndrome  · Mouth sores  · Parkinson disease or other neurologic conditions  · Muscular dystrophies  · Blockage in the esophagus, such as a growth from cancer   You may need to follow a dysphagia diet for only a short time. Or you may be on it for a while. It depends on what is causing your dysphagia and how serious it is. A speech-language pathologist (SLP) assesses a person with dysphagia. The SLP will determine your risk for aspiration and talk about the best food and drink choices for you.  Levels of a dysphagia diet  The Academy of Nutrition and Dietetics has created a diet plan for people with dysphagia. The plan is called the National Dysphagia Diet. The dysphagia diet has 4 levels of foods. The levels are:  · Level 1. These are foods that are pureed or smooth, like pudding. They need no chewing. This includes foods such as yogurt, mashed potatoes with gravy to moisten it, smooth soups, and pureed vegetables and meats.  · Level 2. These are moist foods that need some chewing. They include soft, cooked, or mashed fruits or vegetables, soft or ground meats moist with gravy, cottage cheese, peanut butter, and soft scrambled eggs. You should avoid crackers, nuts, and other dry foods.  · Level 3. This includes soft-solid foods that need  more chewing. This includes meat, fruit, and vegetables that are easy to cut or mash. You should avoid crunchy, sticky, or very dry foods. This includes nuts, crackers, chips, and other snack foods.  · Level 4. This level includes all foods.  You will also need to be careful about the liquids you drink. Talk with your SLP about the liquids that are allowed on your dysphagia diet. Here is more information on managing liquids in a dysphagia diet.  Preparing food and liquids  Your SLP will give you instructions about how to prepare your food. You may need to avoid certain foods, or make changes to some foods. For example, you may need to puree your food. Make sure to taste and season your food before pureeing it. It will be easier to adjust to a new diet if your food smells and tastes appealing.  You may also need to make liquids thicker. You can manage your liquids by making thin liquids thicker. This is done by adding a flavorless gel, gum, powder, or other liquid to it. These are called thickeners. You can also buy pre-thickened liquids. Talk with your SLP if you have any questions about managing your liquids.  While you eat  While eating or drinking, it may help to sit upright, with your back straight. You may need support pillows to get into the best position. It may also help to have few distractions while eating or drinking. Changing between solid food and liquids may also help your swallowing. Stay upright for at least 30 minutes after eating. This can help reduce the risk for aspiration.  Keep watch for symptoms of aspiration such as:  · Coughing or wheezing during or right after eating  · Excess saliva  · Shortness of breath or fatigue while eating  · A wet-sounding voice during or after eating or drinking  · Fever 30 to 60 minutes after eating  After you eat  After meals, its important to do proper oral care. The SLP can give you instructions for your teeth or dentures. Make sure to not swallow any water  during your oral care routine.  Checking your health  Your health care team will keep track of how well you are swallowing. You may need follow-up tests such as a fiberoptic endoscopic evaluation of swallowing (FEES) test. If your swallowing gets better or worse, your SLP may change your dysphagia diet over time. In time you may be able to eat and drink foods and liquids of all kinds.  Getting enough liquids  While on a dysphagia diet, you may have trouble taking in enough fluid. This can cause dehydration, which can lead to serious health problems. Talk with your health care team about how you can help prevent this. In some cases drinking thicker liquids may make some of your medicines work less well. Because of this, you may need some of your medicines changed for a while.  While you are on a dysphagia diet  · Follow all instructions about what food and drink you can have.  · Do swallowing exercises as advised.  · Do not change your food or liquids, even if your swallowing gets better. Talk with your health care provider first.  · Crush medicines and mix them with food as needed.  · Tell all health care providers and caregivers that you are on a dysphagia diet. Explain which foods and liquids you can and cannot have.     When to call your health care provider  Call 911 if you have trouble breathing because of food blocking your airway.  Call your health care provider right away if you have any of these:  · Trouble swallowing that gets worse  · Unplanned weight loss  · Chewed food coming back up into the mouth  · Vomiting   Date Last Reviewed: 5/20/2015  © 4067-8513 [employer], [employer]. [address] [place] [address], [place] [address]. All rights reserved. This information is not intended as a substitute for professional medical care. Always follow your healthcare professional's instructions.        Understanding Aspiration from Dysphagia  Aspiration is when something enters your airway or lungs by accident. It may  be food, liquid, or some other material. This can cause serious health problems, such as pneumonia. Aspiration can happen when you have trouble swallowing normally. This is known as dysphagia.  What happens when you swallow?  When you swallow food, it passes from your mouth down into your throat. This is called the pharynx. From there, the food moves down through a long tube (esophagus) and into your stomach. This journey is made possible by a series of actions from the muscles in these areas.  The pharynx is also part of the system that brings air into your lungs. When you breathe, air enters your mouth and moves into the pharynx. The air then goes down into your main airway (trachea) and into your lungs. A flap of tissue called the epiglottis sits over the top of the trachea. This flap blocks food and drink from going down into the trachea when you swallow.  What causes aspiration?  Aspiration from dysphagia is caused when the muscles in your throat dont work normally. This lets food or drink enter the trachea when you swallow. This can happen as food goes down when you swallow. Or it can happen if food comes back up from your stomach.  When a person has dysphagia, aspiration is always a risk. You may be at risk for aspiration from dysphagia if you have any of these medical conditions:  · Stroke  · Severe dental problems  · Conditions that lead to less saliva, such as Sjogrens syndrome  · Mouth sores  · Parkinson disease or other neurologic conditions  · Muscular dystrophies  · Blockage in the esophagus, such as a growth from cancer  Symptoms of aspiration from dysphagia  Some people who aspirate do not have any signs or symptoms. This is called silent aspiration. But aspiration can cause symptoms such as:  · Sense of food sticking in your throat or coming back into your mouth  · Pain when swallowing  · Difficulty starting a swallow  · Coughing or wheezing after eating  · Chest discomfort or heartburn  · Fever  30 minutes to an hour after eating  · Too much saliva  · Feeling congested after eating or drinking  · Having a wet-sounding voice during or after eating or drinking  · Shortness of breath or tiredness while eating  · Vomiting blood  · Pneumonia that comes back again and again  Symptoms can happen right after eating. Or they may happen over time. You may not have all of these symptoms. They may depend on how often and how much food or drink you aspirate.  Diagnosing aspiration from dysphagia  You will need to be checked for aspiration from dysphagia if you have symptoms. You may also need to be checked if you have had a stroke or other health problem that can cause trouble swallowing. If your health care provider thinks you may be aspirating, you may be told to not eat or drink until you are tested.  Your health care provider will ask about your medical history and symptoms. This may be done by a speech-language pathologist (SLP). The SLP will try to find out if you have problems with the lower or upper part of your swallowing muscles. The SLP may ask about what foods or drink cause problems, and when your symptoms occur.  You may have a physical exam. This may include an exam of your teeth, lips, jaws, tongue, and cheeks. You may be asked to move these areas in certain ways and make certain sounds. Your SLP may also test how you swallow different types of liquids and solids.  You may also need one or more tests. These can help to find the cause of your dysphagia. Tests are often very helpful in showing cases of silent aspiration. The test may include:  · Modified barium swallow test (MBS), to show if material is going into your lungs  · Fiberoptic endoscopic evaluation of swallowing (FEES), which can also show if material is going into your lungs  · Pharyngeal manometry, to check the pressure inside your esophagus  Date Last Reviewed: 3/19/2015  © 6216-5140 [employer]. [address] [facility],  [place] [address]. All rights reserved. This information is not intended as a substitute for professional medical care. Always follow your healthcare professional's instructions.

## 2017-09-03 NOTE — PROGRESS NOTES
"Subjective:       Patient ID: Hernan Fields is a 57 y.o. male.    Chief Complaint: Sore Throat ("sore throat/hurts to swallow"); Fever ("chills all since Thursday"); and Cough    Pt. Presents to clinic with complaints of fever, throat pain, cough and difficulty swallowing x2 weeks. He reports that he had difficulty swallowing since he had Botox injection in his neck 2 weeks for treatment of muscle spasms.  He reports that his cough began soon after having the Botox injections. He states that his fever has reached 102 degrees. He denies chest pain or SOB.      Sore Throat    This is a new problem. The current episode started 1 to 4 weeks ago. The problem has been gradually worsening. Associated symptoms include coughing and trouble swallowing. Pertinent negatives include no congestion, ear discharge, ear pain, headaches or shortness of breath. He has had no exposure to strep or mono.   Fever    This is a new problem. The current episode started in the past 7 days. The problem has been waxing and waning. The maximum temperature noted was 102 to 102.9 F. Associated symptoms include coughing and a sore throat. Pertinent negatives include no chest pain, congestion, ear pain, headaches or wheezing.   Cough   This is a new problem. The current episode started 1 to 4 weeks ago. The problem has been gradually worsening. The cough is productive of sputum. Associated symptoms include a fever and a sore throat. Pertinent negatives include no chest pain, chills, ear pain, headaches, myalgias, postnasal drip, rhinorrhea, shortness of breath or wheezing. Treatments tried: Robitussin. The treatment provided no relief.     Review of Systems   Constitutional: Positive for fever. Negative for chills, diaphoresis and fatigue.   HENT: Positive for sore throat and trouble swallowing. Negative for congestion, ear discharge, ear pain, postnasal drip, rhinorrhea, sinus pressure and sneezing.    Respiratory: Positive for cough. Negative for " shortness of breath and wheezing.    Cardiovascular: Negative for chest pain and palpitations.   Musculoskeletal: Negative for back pain and myalgias.   Neurological: Negative for headaches.       Objective:     Physical Exam   Constitutional: He is oriented to person, place, and time. He appears well-developed and well-nourished. No distress.   HENT:   Head: Normocephalic.   Right Ear: Tympanic membrane, external ear and ear canal normal.   Left Ear: Tympanic membrane, external ear and ear canal normal.   Nose: Nose normal. No mucosal edema or rhinorrhea. Right sinus exhibits no maxillary sinus tenderness and no frontal sinus tenderness. Left sinus exhibits no maxillary sinus tenderness and no frontal sinus tenderness.   Mouth/Throat: Uvula is midline and oropharynx is clear and moist. No oropharyngeal exudate, posterior oropharyngeal edema or posterior oropharyngeal erythema.   Eyes: Conjunctivae and EOM are normal.   Neck: Normal range of motion. Neck supple.   Cardiovascular: Normal rate, regular rhythm and normal heart sounds.    Pulmonary/Chest: Effort normal. No accessory muscle usage. No apnea, no tachypnea and no bradypnea. No respiratory distress. He has decreased breath sounds in the right upper field and the left upper field. He has no wheezes. He has no rhonchi. He has no rales. He exhibits no tenderness.   Lymphadenopathy:        Head (right side): No submental, no submandibular and no tonsillar adenopathy present.        Head (left side): No submental, no submandibular and no tonsillar adenopathy present.     He has no cervical adenopathy.   Neurological: He is alert and oriented to person, place, and time.   Skin: Skin is warm and dry. He is not diaphoretic.   POCT strep: resulted negative. Will send specimen for culture.   Assessment:     1. Upper respiratory tract infection, unspecified type    2. Fever, unspecified fever cause    3. Cough    4. Throat pain      Plan:   Hernan was seen today for sore  throat, fever and cough.    Diagnoses and all orders for this visit:    Upper respiratory tract infection, unspecified type    Fever, unspecified fever cause  -     POCT Rapid Strep A  -     POCT Influenza A/B  -     X-Ray Chest PA And Lateral  -     Strep A culture, throat    Cough  -     X-Ray Chest PA And Lateral  -     amoxicillin-clavulanate 875-125mg (AUGMENTIN) 875-125 mg per tablet; Take 1 tablet by mouth 2 (two) times daily.  -     benzonatate (TESSALON) 200 MG capsule; Take 1 capsule (200 mg total) by mouth 3 (three) times daily as needed for Cough.    Throat pain  -     Strep A culture, throat

## 2017-09-06 LAB — BACTERIA THROAT CULT: NORMAL

## 2017-09-13 DIAGNOSIS — I25.83 CORONARY ARTERY DISEASE DUE TO LIPID RICH PLAQUE: ICD-10-CM

## 2017-09-13 DIAGNOSIS — I25.10 CORONARY ARTERY DISEASE DUE TO LIPID RICH PLAQUE: ICD-10-CM

## 2017-09-13 RX ORDER — ATORVASTATIN CALCIUM 80 MG/1
80 TABLET, FILM COATED ORAL NIGHTLY
Qty: 90 TABLET | Refills: 3 | Status: SHIPPED | OUTPATIENT
Start: 2017-09-13 | End: 2018-09-18 | Stop reason: SDUPTHER

## 2017-10-05 ENCOUNTER — PATIENT MESSAGE (OUTPATIENT)
Dept: CARDIOLOGY | Facility: CLINIC | Age: 57
End: 2017-10-05

## 2017-10-09 ENCOUNTER — OFFICE VISIT (OUTPATIENT)
Dept: CARDIOLOGY | Facility: CLINIC | Age: 57
End: 2017-10-09
Payer: COMMERCIAL

## 2017-10-09 VITALS
WEIGHT: 177.25 LBS | BODY MASS INDEX: 23.49 KG/M2 | HEART RATE: 68 BPM | SYSTOLIC BLOOD PRESSURE: 104 MMHG | DIASTOLIC BLOOD PRESSURE: 74 MMHG | HEIGHT: 73 IN

## 2017-10-09 DIAGNOSIS — E78.2 MIXED HYPERLIPIDEMIA: ICD-10-CM

## 2017-10-09 DIAGNOSIS — I10 ESSENTIAL HYPERTENSION: Primary | Chronic | ICD-10-CM

## 2017-10-09 DIAGNOSIS — R07.89 ATYPICAL CHEST PAIN: ICD-10-CM

## 2017-10-09 DIAGNOSIS — I25.84 CORONARY ARTERY DISEASE DUE TO CALCIFIED CORONARY LESION: ICD-10-CM

## 2017-10-09 DIAGNOSIS — I25.10 CORONARY ARTERY DISEASE DUE TO CALCIFIED CORONARY LESION: ICD-10-CM

## 2017-10-09 DIAGNOSIS — Z98.61 HISTORY OF PTCA: ICD-10-CM

## 2017-10-09 DIAGNOSIS — R93.1 AGATSTON CORONARY ARTERY CALCIUM SCORE GREATER THAN 400: ICD-10-CM

## 2017-10-09 PROCEDURE — 99999 PR PBB SHADOW E&M-EST. PATIENT-LVL III: CPT | Mod: PBBFAC,,, | Performed by: INTERNAL MEDICINE

## 2017-10-09 PROCEDURE — 99213 OFFICE O/P EST LOW 20 MIN: CPT | Mod: S$GLB,,, | Performed by: INTERNAL MEDICINE

## 2017-10-09 NOTE — PROGRESS NOTES
"Subjective:    Patient ID:  Hernan Fields is a 57 y.o. male who presents for evaluation of Hyperlipidemia (Patient presents to clinic today for 6 month follow up. ); Hypertension; and Coronary Artery Disease      HPI Mr. Fields returns for f/u.   His current medical conditions include HCV ab +, CAD, HTN. Nonsmoker. + family h/o CAD.   H/o abnormal calcium score 1087 in 2014.   Has chronic atypical cp sxs  S/p PCI RCA NADINE x 2 June 2016; calcified nonobstructive LAD/left main disease as well, normal LVEF.  Here for f/u.  He seems ok.  No anginal type sxs.  Compliant with meds.  No concerning cp sxs.  BP well controlled on current meds.   Exercising.      Review of Systems   Constitution: Negative.   HENT: Negative.    Eyes: Negative.    Cardiovascular: Negative.    Respiratory: Negative.    Endocrine: Negative.    Hematologic/Lymphatic: Negative.    Skin: Negative.    Musculoskeletal: Negative.    Gastrointestinal: Negative.    Genitourinary: Negative.    Neurological: Negative.    Psychiatric/Behavioral: Negative.    Allergic/Immunologic: Negative.        /74   Pulse 68   Ht 6' 1" (1.854 m)   Wt 80.4 kg (177 lb 4 oz)   BMI 23.39 kg/m²     Wt Readings from Last 3 Encounters:   10/09/17 80.4 kg (177 lb 4 oz)   09/03/17 80.7 kg (177 lb 14.6 oz)   07/25/17 82.4 kg (181 lb 10.5 oz)     Temp Readings from Last 3 Encounters:   09/03/17 99.7 °F (37.6 °C) (Tympanic)   07/25/17 97 °F (36.1 °C) (Tympanic)   03/06/17 97.5 °F (36.4 °C) (Tympanic)     BP Readings from Last 3 Encounters:   10/09/17 104/74   09/03/17 138/80   07/25/17 120/82     Pulse Readings from Last 3 Encounters:   10/09/17 68   09/03/17 95   07/25/17 66          Objective:    Physical Exam   Constitutional: He is oriented to person, place, and time. He appears well-developed and well-nourished.   HENT:   Head: Normocephalic.   Neck: Normal range of motion. Neck supple. No JVD present. Carotid bruit is not present. No thyromegaly present. "   Cardiovascular: Normal rate, regular rhythm, S1 normal and S2 normal.  PMI is not displaced.  Exam reveals no S3, no S4, no distant heart sounds, no friction rub, no midsystolic click and no opening snap.    No murmur heard.  Pulses:       Radial pulses are 2+ on the right side, and 2+ on the left side.   Pulmonary/Chest: Effort normal and breath sounds normal. He has no wheezes. He has no rales.   Abdominal: Soft. Bowel sounds are normal. He exhibits no distension and no ascites. There is no tenderness.   Musculoskeletal: He exhibits no edema.   Neurological: He is alert and oriented to person, place, and time.   Skin: Skin is warm.   Psychiatric: He has a normal mood and affect. His behavior is normal.   Nursing note and vitals reviewed.      I have reviewed all pertinent labs and cardiac studies.      Chemistry        Component Value Date/Time     03/06/2017 0747    K 4.1 03/06/2017 0747     03/06/2017 0747    CO2 27 03/06/2017 0747    BUN 17 03/06/2017 0747    CREATININE 1.0 03/06/2017 0747    GLU 90 03/06/2017 0747        Component Value Date/Time    CALCIUM 9.3 03/06/2017 0747    ALKPHOS 57 03/06/2017 0747    AST 23 03/06/2017 0747    ALT 29 03/06/2017 0747    BILITOT 0.9 03/06/2017 0747    ESTGFRAFRICA >60 03/06/2017 0747    EGFRNONAA >60 03/06/2017 0747        Lab Results   Component Value Date    WBC 3.37 (L) 03/06/2017    HGB 13.7 (L) 03/06/2017    HCT 39.7 (L) 03/06/2017    MCV 94 03/06/2017     03/06/2017     Lab Results   Component Value Date    HGBA1C 5.0 03/06/2017     Lab Results   Component Value Date    CHOL 102 (L) 03/06/2017    CHOL 125 01/29/2016    CHOL 125 01/08/2016     Lab Results   Component Value Date    HDL 31 (L) 03/06/2017    HDL 37 (L) 01/29/2016    HDL 40 01/08/2016     Lab Results   Component Value Date    LDLCALC 58.0 (L) 03/06/2017    LDLCALC 75.0 01/29/2016    LDLCALC 74.8 01/08/2016     Lab Results   Component Value Date    TRIG 65 03/06/2017    TRIG 65  01/29/2016    TRIG 51 01/08/2016     Lab Results   Component Value Date    CHOLHDL 30.4 03/06/2017    CHOLHDL 29.6 01/29/2016    CHOLHDL 32.0 01/08/2016           Assessment:       Stable CAD on current medical tx.    1. Essential hypertension    2. Coronary artery disease due to calcified coronary lesion    3. History of PTCA    4. Mixed hyperlipidemia    5. Atypical chest pain    6. Agatston coronary artery calcium score greater than 400         Plan:             Continue current medications.  Discussed risks/benefits of prolonged asa, plavix for CAD protection. Pt prefers to stay on plavix for now.  Cardiac diet  Exercise  Check lipids/labs    F/u 6 months.

## 2017-10-10 ENCOUNTER — LAB VISIT (OUTPATIENT)
Dept: LAB | Facility: HOSPITAL | Age: 57
End: 2017-10-10
Attending: INTERNAL MEDICINE
Payer: COMMERCIAL

## 2017-10-10 DIAGNOSIS — I25.10 CORONARY ARTERY DISEASE DUE TO CALCIFIED CORONARY LESION: ICD-10-CM

## 2017-10-10 DIAGNOSIS — I25.84 CORONARY ARTERY DISEASE DUE TO CALCIFIED CORONARY LESION: ICD-10-CM

## 2017-10-10 DIAGNOSIS — E78.2 MIXED HYPERLIPIDEMIA: ICD-10-CM

## 2017-10-10 LAB
ALBUMIN SERPL BCP-MCNC: 4 G/DL
ALP SERPL-CCNC: 81 U/L
ALT SERPL W/O P-5'-P-CCNC: 21 U/L
ANION GAP SERPL CALC-SCNC: 11 MMOL/L
AST SERPL-CCNC: 19 U/L
BILIRUB SERPL-MCNC: 0.5 MG/DL
BUN SERPL-MCNC: 17 MG/DL
CALCIUM SERPL-MCNC: 9.7 MG/DL
CHLORIDE SERPL-SCNC: 106 MMOL/L
CHOLEST SERPL-MCNC: 97 MG/DL
CHOLEST/HDLC SERPL: 2.6 {RATIO}
CO2 SERPL-SCNC: 25 MMOL/L
CREAT SERPL-MCNC: 1 MG/DL
CRP SERPL-MCNC: 0.41 MG/L
EST. GFR  (AFRICAN AMERICAN): >60 ML/MIN/1.73 M^2
EST. GFR  (NON AFRICAN AMERICAN): >60 ML/MIN/1.73 M^2
GLUCOSE SERPL-MCNC: 74 MG/DL
HDLC SERPL-MCNC: 38 MG/DL
HDLC SERPL: 39.2 %
LDLC SERPL CALC-MCNC: 44.6 MG/DL
NONHDLC SERPL-MCNC: 59 MG/DL
POTASSIUM SERPL-SCNC: 4.6 MMOL/L
PROT SERPL-MCNC: 7 G/DL
SODIUM SERPL-SCNC: 142 MMOL/L
TRIGL SERPL-MCNC: 72 MG/DL

## 2017-10-10 PROCEDURE — 86141 C-REACTIVE PROTEIN HS: CPT

## 2017-10-10 PROCEDURE — 36415 COLL VENOUS BLD VENIPUNCTURE: CPT | Mod: PO

## 2017-10-10 PROCEDURE — 80061 LIPID PANEL: CPT

## 2017-10-10 PROCEDURE — 80053 COMPREHEN METABOLIC PANEL: CPT

## 2017-10-11 ENCOUNTER — TELEPHONE (OUTPATIENT)
Dept: CARDIOLOGY | Facility: CLINIC | Age: 57
End: 2017-10-11

## 2018-01-29 ENCOUNTER — OFFICE VISIT (OUTPATIENT)
Dept: INTERNAL MEDICINE | Facility: CLINIC | Age: 58
End: 2018-01-29
Payer: COMMERCIAL

## 2018-01-29 VITALS
TEMPERATURE: 98 F | OXYGEN SATURATION: 98 % | WEIGHT: 179 LBS | HEART RATE: 61 BPM | BODY MASS INDEX: 23.72 KG/M2 | DIASTOLIC BLOOD PRESSURE: 74 MMHG | HEIGHT: 73 IN | SYSTOLIC BLOOD PRESSURE: 110 MMHG

## 2018-01-29 DIAGNOSIS — I10 ESSENTIAL HYPERTENSION: Chronic | ICD-10-CM

## 2018-01-29 DIAGNOSIS — R93.1 AGATSTON CORONARY ARTERY CALCIUM SCORE GREATER THAN 400: ICD-10-CM

## 2018-01-29 DIAGNOSIS — N52.8 OTHER MALE ERECTILE DYSFUNCTION: ICD-10-CM

## 2018-01-29 DIAGNOSIS — I25.84 CORONARY ARTERY DISEASE DUE TO CALCIFIED CORONARY LESION: ICD-10-CM

## 2018-01-29 DIAGNOSIS — S50.10XA CONTUSION OF FOREARM, UNSPECIFIED LATERALITY, INITIAL ENCOUNTER: Primary | ICD-10-CM

## 2018-01-29 DIAGNOSIS — I25.10 CORONARY ARTERY DISEASE DUE TO CALCIFIED CORONARY LESION: ICD-10-CM

## 2018-01-29 PROCEDURE — 99999 PR PBB SHADOW E&M-EST. PATIENT-LVL IV: CPT | Mod: PBBFAC,,, | Performed by: INTERNAL MEDICINE

## 2018-01-29 PROCEDURE — 99213 OFFICE O/P EST LOW 20 MIN: CPT | Mod: S$GLB,,, | Performed by: INTERNAL MEDICINE

## 2018-01-29 RX ORDER — SILDENAFIL 100 MG/1
TABLET, FILM COATED ORAL
Qty: 10 TABLET | Refills: 6 | Status: SHIPPED | OUTPATIENT
Start: 2018-01-29 | End: 2019-09-30 | Stop reason: SDUPTHER

## 2018-01-29 NOTE — PROGRESS NOTES
"Subjective:      Patient ID: Hernan Fields is a 57 y.o. male.    Chief Complaint: Bruise and Medication Refill    58 yo with Patient Active Problem List:     HTN (hypertension)     Anxiety     Agatston coronary artery calcium score greater than 400     Mixed hyperlipidemia     False positive serological test for hepatitis C     Coronary artery disease     History of PTCA     Other male erectile dysfunction    Here today for requesting evaluation of bruising to arms and legs.  He reports moving furniture and boxes over the weekend and noticing bruising the following day.  He does not remember any significant impact.  He is currently on Plavix and aspirin.  He feels the bruises are decreasing in size and intensity.  He also reports that he has had erectile dysfunction for years and requests a refill of Viagra.      Review of Systems   Constitutional: Negative for chills and fever.   HENT: Negative for ear pain and sore throat.    Respiratory: Negative for cough.    Cardiovascular: Negative for chest pain.   Gastrointestinal: Negative for abdominal pain and blood in stool.   Genitourinary: Negative for dysuria and hematuria.   Neurological: Negative for seizures and syncope.     Objective:   /74 (BP Location: Right arm, Patient Position: Sitting)   Pulse 61   Temp 97.6 °F (36.4 °C) (Tympanic)   Ht 6' 1" (1.854 m)   Wt 81.2 kg (179 lb 0.2 oz)   SpO2 98%   BMI 23.62 kg/m²     Physical Exam   Constitutional: He is oriented to person, place, and time. He appears well-developed and well-nourished. No distress.   HENT:   Mouth/Throat: Oropharynx is clear and moist. No oropharyngeal exudate.   Eyes: Conjunctivae and EOM are normal.   Cardiovascular: Normal rate and regular rhythm.    Pulmonary/Chest: Effort normal and breath sounds normal.   Musculoskeletal: Normal range of motion.   Neurological: He is alert and oriented to person, place, and time.   Skin: Skin is warm and dry.   Multiple bruises to bilateral " forearms.  Essentially flat.  Appear to be resolving normally.  Largest contusion approximately 6 cm in maximum diameter.  No significant tenderness.  No warmth.  Smaller approximately 2 to 3 cm bruises to bilateral upper legs.  No induration.  No warmth.  No fluctuance.   Psychiatric: He has a normal mood and affect. His behavior is normal.       Assessment:     1. Contusion of forearm, unspecified laterality, initial encounter    2. Coronary artery disease due to calcified coronary lesion    3. Essential hypertension    4. Agatston coronary artery calcium score greater than 400    5. Other male erectile dysfunction      Plan:   Contusion of forearm, unspecified laterality, initial encounter  Cool compresses as discussed.     Coronary artery disease due to calcified coronary lesion  Cont current meds    Essential hypertension  Stable    Agatston coronary artery calcium score greater than 400    Other male erectile dysfunction  -     sildenafil (VIAGRA) 100 MG tablet; TAKE 1 TABLET BY MOUTH 20 MINUTES BEFORE SEXUAL ACTIVITY  Dispense: 10 tablet; Refill: 6    Other orders  -     Cancel: Comprehensive metabolic panel; Future; Expected date: 01/29/2018  -     Cancel: CBC auto differential; Future; Expected date: 01/29/2018  -     Cancel: TSH; Future; Expected date: 01/29/2018  -     Cancel: Lipid panel; Future; Expected date: 01/29/2018  -     Cancel: Vitamin D; Future; Expected date: 01/29/2018  -     Cancel: APTT; Future; Expected date: 01/29/2018  -     Cancel: Protime-INR; Future; Expected date: 01/29/2018            Problem List Items Addressed This Visit        Cardiac/Vascular    Agatston coronary artery calcium score greater than 400    Coronary artery disease    HTN (hypertension) (Chronic)       Renal/    Other male erectile dysfunction    Relevant Medications    sildenafil (VIAGRA) 100 MG tablet      Other Visit Diagnoses     Contusion of forearm, unspecified laterality, initial encounter    -  Primary           No Follow-up on file.

## 2018-02-12 RX ORDER — METOPROLOL SUCCINATE 50 MG/1
TABLET, EXTENDED RELEASE ORAL
Qty: 90 TABLET | Refills: 1 | Status: SHIPPED | OUTPATIENT
Start: 2018-02-12 | End: 2019-11-04

## 2018-03-05 RX ORDER — METOPROLOL SUCCINATE 50 MG/1
TABLET, EXTENDED RELEASE ORAL
Qty: 90 TABLET | Refills: 3 | Status: SHIPPED | OUTPATIENT
Start: 2018-03-05 | End: 2018-09-19 | Stop reason: SDUPTHER

## 2018-04-17 ENCOUNTER — TELEPHONE (OUTPATIENT)
Dept: CARDIOLOGY | Facility: CLINIC | Age: 58
End: 2018-04-17

## 2018-04-17 DIAGNOSIS — E78.5 DYSLIPIDEMIA: Primary | ICD-10-CM

## 2018-04-17 NOTE — TELEPHONE ENCOUNTER
----- Message from Misti Suh sent at 4/17/2018  7:41 AM CDT -----  pt needs callback, will elaborate (cancelled appt)....[phone]

## 2018-04-27 ENCOUNTER — LAB VISIT (OUTPATIENT)
Dept: LAB | Facility: HOSPITAL | Age: 58
End: 2018-04-27
Attending: INTERNAL MEDICINE
Payer: COMMERCIAL

## 2018-04-27 DIAGNOSIS — E78.5 DYSLIPIDEMIA: ICD-10-CM

## 2018-04-27 LAB
ALBUMIN SERPL BCP-MCNC: 3.9 G/DL
ALP SERPL-CCNC: 77 U/L
ALT SERPL W/O P-5'-P-CCNC: 23 U/L
ANION GAP SERPL CALC-SCNC: 11 MMOL/L
AST SERPL-CCNC: 17 U/L
BILIRUB SERPL-MCNC: 0.7 MG/DL
BUN SERPL-MCNC: 15 MG/DL
CALCIUM SERPL-MCNC: 9.9 MG/DL
CHLORIDE SERPL-SCNC: 105 MMOL/L
CHOLEST SERPL-MCNC: 101 MG/DL
CHOLEST/HDLC SERPL: 3.2 {RATIO}
CO2 SERPL-SCNC: 28 MMOL/L
CREAT SERPL-MCNC: 1 MG/DL
EST. GFR  (AFRICAN AMERICAN): >60 ML/MIN/1.73 M^2
EST. GFR  (NON AFRICAN AMERICAN): >60 ML/MIN/1.73 M^2
GLUCOSE SERPL-MCNC: 84 MG/DL
HDLC SERPL-MCNC: 32 MG/DL
HDLC SERPL: 31.7 %
LDLC SERPL CALC-MCNC: 54.8 MG/DL
NONHDLC SERPL-MCNC: 69 MG/DL
POTASSIUM SERPL-SCNC: 4.7 MMOL/L
PROT SERPL-MCNC: 7 G/DL
SODIUM SERPL-SCNC: 144 MMOL/L
TRIGL SERPL-MCNC: 71 MG/DL

## 2018-04-27 PROCEDURE — 80053 COMPREHEN METABOLIC PANEL: CPT

## 2018-04-27 PROCEDURE — 36415 COLL VENOUS BLD VENIPUNCTURE: CPT | Mod: PO

## 2018-04-27 PROCEDURE — 80061 LIPID PANEL: CPT

## 2018-05-01 ENCOUNTER — OFFICE VISIT (OUTPATIENT)
Dept: CARDIOLOGY | Facility: CLINIC | Age: 58
End: 2018-05-01
Payer: COMMERCIAL

## 2018-05-01 ENCOUNTER — CLINICAL SUPPORT (OUTPATIENT)
Dept: CARDIOLOGY | Facility: CLINIC | Age: 58
End: 2018-05-01
Payer: COMMERCIAL

## 2018-05-01 VITALS
DIASTOLIC BLOOD PRESSURE: 78 MMHG | SYSTOLIC BLOOD PRESSURE: 112 MMHG | BODY MASS INDEX: 23.86 KG/M2 | HEART RATE: 63 BPM | WEIGHT: 180 LBS | HEIGHT: 73 IN

## 2018-05-01 DIAGNOSIS — I25.10 CORONARY ARTERY DISEASE DUE TO CALCIFIED CORONARY LESION: Primary | ICD-10-CM

## 2018-05-01 DIAGNOSIS — Z98.61 HISTORY OF PTCA: ICD-10-CM

## 2018-05-01 DIAGNOSIS — R93.1 AGATSTON CORONARY ARTERY CALCIUM SCORE GREATER THAN 400: ICD-10-CM

## 2018-05-01 DIAGNOSIS — E78.2 MIXED HYPERLIPIDEMIA: ICD-10-CM

## 2018-05-01 DIAGNOSIS — I25.84 CORONARY ARTERY DISEASE DUE TO CALCIFIED CORONARY LESION: Primary | ICD-10-CM

## 2018-05-01 DIAGNOSIS — I25.10 CORONARY ARTERY DISEASE, ANGINA PRESENCE UNSPECIFIED, UNSPECIFIED VESSEL OR LESION TYPE, UNSPECIFIED WHETHER NATIVE OR TRANSPLANTED HEART: ICD-10-CM

## 2018-05-01 DIAGNOSIS — I10 ESSENTIAL HYPERTENSION: Chronic | ICD-10-CM

## 2018-05-01 DIAGNOSIS — I25.10 CORONARY ARTERY DISEASE, ANGINA PRESENCE UNSPECIFIED, UNSPECIFIED VESSEL OR LESION TYPE, UNSPECIFIED WHETHER NATIVE OR TRANSPLANTED HEART: Primary | ICD-10-CM

## 2018-05-01 PROCEDURE — 99214 OFFICE O/P EST MOD 30 MIN: CPT | Mod: S$GLB,,, | Performed by: INTERNAL MEDICINE

## 2018-05-01 PROCEDURE — 93000 ELECTROCARDIOGRAM COMPLETE: CPT | Mod: S$GLB,,, | Performed by: INTERNAL MEDICINE

## 2018-05-01 PROCEDURE — 99999 PR PBB SHADOW E&M-EST. PATIENT-LVL II: CPT | Mod: PBBFAC,,, | Performed by: INTERNAL MEDICINE

## 2018-05-01 NOTE — PROGRESS NOTES
"Subjective:    Patient ID:  Hernan Fields is a 57 y.o. male who presents for evaluation of Hypertension (Patient presents to clinic today for 6 month follow up. ); Coronary Artery Disease; Hyperlipidemia; and Risk Factor Management For Atherosclerosis        HPI Mr. Fields returns for f/u.   His current medical conditions include HCV ab +, CAD, HTN. Nonsmoker. + family h/o CAD.   H/o abnormal calcium score 1087 in 2014.   H/o  chronic atypical cp sxs  S/p PCI RCA NADINE x 2 June 2016; calcified nonobstructive LAD/left main disease as well, normal LVEF.  Here for f/u.  CAD stable.  No active anginal sxs on current meds.  No cp sxs.   Lipids controlled on statin tx.  HTN well controlled on current medical tx, no associated sxs.  He would like to get off plavix.  Compliant with meds.  ecg today reviewed and is normal.      Past Medical History:   Diagnosis Date    Allergy     Anxiety     public speaking issues.    Coronary artery disease 12/15/2014    dr alexandra    Elevated PSA     Ex-smoker     one year at 18 y/o    False positive serological test for hepatitis C     H/O: Bell's palsy     affects left side    History of PTCA 9/12/2016    Hypertension     Meningitis     Mixed hyperlipidemia 12/15/2014       Review of Systems   Constitution: Negative.   HENT: Negative.    Eyes: Negative.    Cardiovascular: Negative.    Respiratory: Negative.    Endocrine: Negative.    Hematologic/Lymphatic: Negative.    Skin: Negative.    Musculoskeletal: Negative.    Gastrointestinal: Negative.    Genitourinary: Negative.    Neurological: Negative.    Psychiatric/Behavioral: Negative.    Allergic/Immunologic: Negative.        /78   Pulse 63   Ht 6' 1" (1.854 m)   Wt 81.6 kg (180 lb)   BMI 23.75 kg/m²          Objective:    Physical Exam   Constitutional: He is oriented to person, place, and time. He appears well-developed and well-nourished.   HENT:   Head: Normocephalic.   Neck: Normal range of motion. Neck supple. No " JVD present. Carotid bruit is not present. No thyromegaly present.   Cardiovascular: Normal rate, regular rhythm, S1 normal and S2 normal.  PMI is not displaced.  Exam reveals no S3, no S4, no distant heart sounds, no friction rub, no midsystolic click and no opening snap.    No murmur heard.  Pulses:       Radial pulses are 2+ on the right side, and 2+ on the left side.   Pulmonary/Chest: Effort normal and breath sounds normal. He has no wheezes. He has no rales.   Abdominal: Soft. Bowel sounds are normal. He exhibits no distension and no abdominal bruit. There is no tenderness.   Musculoskeletal: He exhibits no edema.   Neurological: He is alert and oriented to person, place, and time.   Skin: Skin is warm.   Psychiatric: He has a normal mood and affect. His behavior is normal.   Nursing note and vitals reviewed.      I have reviewed all pertinent labs and cardiac studies.      Chemistry        Component Value Date/Time     04/27/2018 0750    K 4.7 04/27/2018 0750     04/27/2018 0750    CO2 28 04/27/2018 0750    BUN 15 04/27/2018 0750    CREATININE 1.0 04/27/2018 0750    GLU 84 04/27/2018 0750        Component Value Date/Time    CALCIUM 9.9 04/27/2018 0750    ALKPHOS 77 04/27/2018 0750    AST 17 04/27/2018 0750    ALT 23 04/27/2018 0750    BILITOT 0.7 04/27/2018 0750    ESTGFRAFRICA >60.0 04/27/2018 0750    EGFRNONAA >60.0 04/27/2018 0750        Lab Results   Component Value Date    WBC 3.37 (L) 03/06/2017    HGB 13.7 (L) 03/06/2017    HCT 39.7 (L) 03/06/2017    MCV 94 03/06/2017     03/06/2017     Lab Results   Component Value Date    CHOL 101 (L) 04/27/2018    CHOL 97 (L) 10/10/2017    CHOL 102 (L) 03/06/2017     Lab Results   Component Value Date    HDL 32 (L) 04/27/2018    HDL 38 (L) 10/10/2017    HDL 31 (L) 03/06/2017     Lab Results   Component Value Date    LDLCALC 54.8 (L) 04/27/2018    LDLCALC 44.6 (L) 10/10/2017    LDLCALC 58.0 (L) 03/06/2017     Lab Results   Component Value Date     TRIG 71 04/27/2018    TRIG 72 10/10/2017    TRIG 65 03/06/2017     Lab Results   Component Value Date    CHOLHDL 31.7 04/27/2018    CHOLHDL 39.2 10/10/2017    CHOLHDL 30.4 03/06/2017           Assessment:       1. Coronary artery disease due to calcified coronary lesion    2. Agatston coronary artery calcium score greater than 400    3. History of PTCA    4. Essential hypertension    5. Mixed hyperlipidemia         Plan:             Long discussion about pros/cons of dual antiplatelet tx for CAD.  Risks/benefits discussed about staying on plavix last appt and today.  Pt prefers to stop plavix.  Stay on 81 mg ASA qd.  Long discussion on CAD mgt, pt had numerous questions.  Continue medical tx  Continue statin, ranexa, toprol.  Cardiac diet  Exercise daily.    F/u 6 months with stress echo.

## 2018-05-23 DIAGNOSIS — F41.9 ANXIETY: ICD-10-CM

## 2018-05-23 RX ORDER — CITALOPRAM 20 MG/1
20 TABLET, FILM COATED ORAL DAILY
Qty: 90 TABLET | Refills: 0 | Status: SHIPPED | OUTPATIENT
Start: 2018-05-23 | End: 2019-09-30

## 2018-05-24 NOTE — TELEPHONE ENCOUNTER
Spoke with pt, notified him that his Citalopram prescription was refilled x 1 month but is due for appointment. Pt states that he takes 1/2 tablet a day and has plenty of medicine. Pt also stated he will schedule an appointment when its time for executive health.

## 2018-06-14 DIAGNOSIS — I25.83 CORONARY ARTERY DISEASE DUE TO LIPID RICH PLAQUE: ICD-10-CM

## 2018-06-14 DIAGNOSIS — I25.10 CORONARY ARTERY DISEASE DUE TO LIPID RICH PLAQUE: ICD-10-CM

## 2018-06-14 RX ORDER — RANOLAZINE 500 MG/1
500 TABLET, FILM COATED, EXTENDED RELEASE ORAL 2 TIMES DAILY
Qty: 180 TABLET | Refills: 2 | Status: SHIPPED | OUTPATIENT
Start: 2018-06-14 | End: 2019-04-19 | Stop reason: SDUPTHER

## 2018-06-18 NOTE — TELEPHONE ENCOUNTER
Plavix has been discontinued since May 1, 2018  Message left for Pharmacy.      ----- Message from Ty Seo sent at 6/18/2018  9:34 AM CDT -----  Contact: [facility]   [facility] calling [facility]. [place] /         ..  [id number] [place] [facility] [address] [address] [facility]  [address] [facility] [address]  Phone: [phone] Fax: [phone]

## 2018-06-19 RX ORDER — CLOPIDOGREL BISULFATE 75 MG/1
TABLET ORAL
Qty: 90 TABLET | Refills: 2 | OUTPATIENT
Start: 2018-06-19

## 2018-08-02 ENCOUNTER — TELEPHONE (OUTPATIENT)
Dept: CARDIOLOGY | Facility: CLINIC | Age: 58
End: 2018-08-02

## 2018-08-02 NOTE — TELEPHONE ENCOUNTER
----- Message from Jose Alejandro Lane sent at 8/2/2018 10:29 AM CDT -----  Contact: Dr Johnathan Weiler [facility]  Caller request a call from the nurse to get surgery clearance for pt to have surgery on 8-16-18, I show pt last visit was 04-17-17, please contact caller at [phone]./TB

## 2018-08-03 NOTE — TELEPHONE ENCOUNTER
I have attempted without success to contact this patient by phone left message to call clinic back. Fax clearance over to [phone]

## 2018-09-10 ENCOUNTER — TELEPHONE (OUTPATIENT)
Dept: CARDIOLOGY | Facility: HOSPITAL | Age: 58
End: 2018-09-10

## 2018-09-10 NOTE — TELEPHONE ENCOUNTER
----- Message from Maira Storm sent at 9/10/2018  1:53 PM CDT -----  Heanne Agustin would you like to order labs for this patient. Mr. Fields was asking if he needed any ordered?

## 2018-09-18 DIAGNOSIS — I25.10 CORONARY ARTERY DISEASE DUE TO LIPID RICH PLAQUE: ICD-10-CM

## 2018-09-18 DIAGNOSIS — I25.83 CORONARY ARTERY DISEASE DUE TO LIPID RICH PLAQUE: ICD-10-CM

## 2018-09-18 RX ORDER — ATORVASTATIN CALCIUM 80 MG/1
80 TABLET, FILM COATED ORAL NIGHTLY
Qty: 90 TABLET | Refills: 3 | Status: SHIPPED | OUTPATIENT
Start: 2018-09-18 | End: 2019-09-30 | Stop reason: SDUPTHER

## 2018-09-19 ENCOUNTER — OFFICE VISIT (OUTPATIENT)
Dept: INTERNAL MEDICINE | Facility: CLINIC | Age: 58
End: 2018-09-19
Payer: COMMERCIAL

## 2018-09-19 ENCOUNTER — LAB VISIT (OUTPATIENT)
Dept: LAB | Facility: HOSPITAL | Age: 58
End: 2018-09-19
Attending: INTERNAL MEDICINE
Payer: COMMERCIAL

## 2018-09-19 VITALS
BODY MASS INDEX: 23.7 KG/M2 | SYSTOLIC BLOOD PRESSURE: 122 MMHG | HEART RATE: 63 BPM | OXYGEN SATURATION: 98 % | TEMPERATURE: 98 F | DIASTOLIC BLOOD PRESSURE: 78 MMHG | WEIGHT: 178.81 LBS | HEIGHT: 73 IN

## 2018-09-19 DIAGNOSIS — M54.2 NECK PAIN: ICD-10-CM

## 2018-09-19 DIAGNOSIS — M54.2 NECK PAIN: Primary | ICD-10-CM

## 2018-09-19 LAB
ALBUMIN SERPL BCP-MCNC: 4.1 G/DL
ALP SERPL-CCNC: 84 U/L
ALT SERPL W/O P-5'-P-CCNC: 23 U/L
ANION GAP SERPL CALC-SCNC: 8 MMOL/L
AST SERPL-CCNC: 15 U/L
BASOPHILS # BLD AUTO: 0.04 K/UL
BASOPHILS NFR BLD: 0.7 %
BILIRUB SERPL-MCNC: 0.8 MG/DL
BUN SERPL-MCNC: 17 MG/DL
CALCIUM SERPL-MCNC: 10 MG/DL
CHLORIDE SERPL-SCNC: 105 MMOL/L
CO2 SERPL-SCNC: 28 MMOL/L
CREAT SERPL-MCNC: 1 MG/DL
DIFFERENTIAL METHOD: ABNORMAL
EOSINOPHIL # BLD AUTO: 0.1 K/UL
EOSINOPHIL NFR BLD: 2 %
ERYTHROCYTE [DISTWIDTH] IN BLOOD BY AUTOMATED COUNT: 11.6 %
EST. GFR  (AFRICAN AMERICAN): >60 ML/MIN/1.73 M^2
EST. GFR  (NON AFRICAN AMERICAN): >60 ML/MIN/1.73 M^2
GLUCOSE SERPL-MCNC: 96 MG/DL
HCT VFR BLD AUTO: 42.2 %
HGB BLD-MCNC: 14.3 G/DL
IMM GRANULOCYTES # BLD AUTO: 0.02 K/UL
IMM GRANULOCYTES NFR BLD AUTO: 0.3 %
LYMPHOCYTES # BLD AUTO: 1.3 K/UL
LYMPHOCYTES NFR BLD: 21.6 %
MCH RBC QN AUTO: 32.8 PG
MCHC RBC AUTO-ENTMCNC: 33.9 G/DL
MCV RBC AUTO: 97 FL
MONOCYTES # BLD AUTO: 0.5 K/UL
MONOCYTES NFR BLD: 8.6 %
NEUTROPHILS # BLD AUTO: 4 K/UL
NEUTROPHILS NFR BLD: 66.8 %
NRBC BLD-RTO: 0 /100 WBC
PLATELET # BLD AUTO: 224 K/UL
PMV BLD AUTO: 10.3 FL
POTASSIUM SERPL-SCNC: 5.1 MMOL/L
PROT SERPL-MCNC: 7.1 G/DL
RBC # BLD AUTO: 4.36 M/UL
SODIUM SERPL-SCNC: 141 MMOL/L
WBC # BLD AUTO: 6.03 K/UL

## 2018-09-19 PROCEDURE — 36415 COLL VENOUS BLD VENIPUNCTURE: CPT | Mod: PO

## 2018-09-19 PROCEDURE — 99999 PR PBB SHADOW E&M-EST. PATIENT-LVL III: CPT | Mod: PBBFAC,,, | Performed by: INTERNAL MEDICINE

## 2018-09-19 PROCEDURE — 3078F DIAST BP <80 MM HG: CPT | Mod: CPTII,S$GLB,, | Performed by: INTERNAL MEDICINE

## 2018-09-19 PROCEDURE — 3008F BODY MASS INDEX DOCD: CPT | Mod: CPTII,S$GLB,, | Performed by: INTERNAL MEDICINE

## 2018-09-19 PROCEDURE — 85025 COMPLETE CBC W/AUTO DIFF WBC: CPT

## 2018-09-19 PROCEDURE — 99214 OFFICE O/P EST MOD 30 MIN: CPT | Mod: S$GLB,,, | Performed by: INTERNAL MEDICINE

## 2018-09-19 PROCEDURE — 3074F SYST BP LT 130 MM HG: CPT | Mod: CPTII,S$GLB,, | Performed by: INTERNAL MEDICINE

## 2018-09-19 PROCEDURE — 80053 COMPREHEN METABOLIC PANEL: CPT

## 2018-09-19 RX ORDER — CYCLOBENZAPRINE HCL 10 MG
TABLET ORAL
Qty: 30 TABLET | Refills: 0 | Status: SHIPPED | OUTPATIENT
Start: 2018-09-19 | End: 2020-10-15

## 2018-09-19 RX ORDER — NAPROXEN 500 MG/1
500 TABLET ORAL 2 TIMES DAILY WITH MEALS
Qty: 30 TABLET | Refills: 0 | Status: SHIPPED | OUTPATIENT
Start: 2018-09-19 | End: 2019-11-04

## 2018-09-20 NOTE — PROGRESS NOTES
Subjective:      Patient ID: Hernan Fields is a 58 y.o. male.    Chief Complaint: stiif neck and mild headaches (x 1 mth)    59 yo with Patient Active Problem List:     HTN (hypertension)     Anxiety     Agatston coronary artery calcium score greater than 400     Mixed hyperlipidemia     False positive serological test for hepatitis C     Coronary artery disease     History of PTCA     Other male erectile dysfunction    Past Medical History:  No date: Allergy  No date: Anxiety      Comment:  public speaking issues.  12/15/2014: Coronary artery disease      Comment:  dr alexandra  No date: Elevated PSA  No date: Ex-smoker      Comment:  one year at 20 y/o  No date: False positive serological test for hepatitis C  No date: H/O: Bell's palsy      Comment:  affects left side  9/12/2016: History of PTCA  No date: Hypertension  No date: Meningitis  12/15/2014: Mixed hyperlipidemia    Here today c/o neck pain.  Symptoms began after increase stress with work.      Neck Pain    This is a new problem. The current episode started more than 1 month ago. The problem occurs intermittently. The problem has been waxing and waning. The pain is associated with nothing. The pain is present in the left side and right side. The quality of the pain is described as aching. The pain is moderate. The symptoms are aggravated by twisting. The pain is same all the time. Stiffness is present all day. Associated symptoms include headaches (occipital radiating from neck). Pertinent negatives include no chest pain, fever, leg pain, numbness, pain with swallowing, photophobia, syncope, tingling, trouble swallowing, weakness or weight loss. He has tried nothing for the symptoms. The treatment provided no relief.     Review of Systems   Constitutional: Negative for fever and weight loss.   HENT: Negative for trouble swallowing.    Eyes: Negative for photophobia.   Cardiovascular: Negative for chest pain and syncope.   Musculoskeletal: Positive for neck  "pain.   Neurological: Positive for headaches (occipital radiating from neck). Negative for tingling, weakness and numbness.     Objective:   /78 (BP Location: Right arm, Patient Position: Sitting)   Pulse 63   Temp 97.8 °F (36.6 °C) (Oral)   Ht 6' 1" (1.854 m)   Wt 81.1 kg (178 lb 12.7 oz)   SpO2 98%   BMI 23.59 kg/m²     Physical Exam   Constitutional: He is oriented to person, place, and time. He appears well-developed and well-nourished. No distress.   HENT:   Head: Normocephalic and atraumatic.   Mouth/Throat: Oropharynx is clear and moist.   Eyes: EOM are normal. Pupils are equal, round, and reactive to light.   Neck: Neck supple. Normal carotid pulses present. Muscular tenderness present. No spinous process tenderness present. Carotid bruit is not present. No neck rigidity. Normal range of motion present. No thyromegaly present.       Cardiovascular: Normal rate and regular rhythm.   Pulmonary/Chest: Effort normal and breath sounds normal. He has no wheezes. He has no rales.   Abdominal: Soft. Bowel sounds are normal. There is no tenderness.   Lymphadenopathy:     He has no cervical adenopathy.   Neurological: He is alert and oriented to person, place, and time. He displays normal reflexes. He exhibits normal muscle tone. Coordination normal.   Skin: Skin is warm and dry.   Psychiatric: He has a normal mood and affect. His behavior is normal.       Assessment:     1. Neck pain      Plan:   Neck pain  -     CBC auto differential; Future; Expected date: 09/19/2018  -     Comprehensive metabolic panel; Future; Expected date: 09/19/2018  -     cyclobenzaprine (FLEXERIL) 10 MG tablet; 1/2 to one tab po qhs prn muscle spasm  Dispense: 30 tablet; Refill: 0  -     naproxen (NAPROSYN) 500 MG tablet; Take 1 tablet (500 mg total) by mouth 2 (two) times daily with meals. For pain and inflammation  Dispense: 30 tablet; Refill: 0        Lab Frequency Next Occurrence   Exercise stress echo with color flow Once " 11/01/2018       Problem List Items Addressed This Visit     None      Visit Diagnoses     Neck pain    -  Primary    Relevant Medications    cyclobenzaprine (FLEXERIL) 10 MG tablet    naproxen (NAPROSYN) 500 MG tablet    Other Relevant Orders    CBC auto differential (Completed)    Comprehensive metabolic panel (Completed)          Follow-up if symptoms worsen or fail to improve.

## 2018-09-25 ENCOUNTER — TELEPHONE (OUTPATIENT)
Dept: CARDIOLOGY | Facility: CLINIC | Age: 58
End: 2018-09-25

## 2018-09-25 ENCOUNTER — CLINICAL SUPPORT (OUTPATIENT)
Dept: CARDIOLOGY | Facility: CLINIC | Age: 58
End: 2018-09-25
Attending: INTERNAL MEDICINE
Payer: COMMERCIAL

## 2018-09-25 DIAGNOSIS — I25.10 CORONARY ARTERY DISEASE DUE TO CALCIFIED CORONARY LESION: ICD-10-CM

## 2018-09-25 DIAGNOSIS — I25.84 CORONARY ARTERY DISEASE DUE TO CALCIFIED CORONARY LESION: ICD-10-CM

## 2018-09-25 DIAGNOSIS — Z98.61 HISTORY OF PTCA: ICD-10-CM

## 2018-09-25 DIAGNOSIS — R93.1 AGATSTON CORONARY ARTERY CALCIUM SCORE GREATER THAN 400: ICD-10-CM

## 2018-09-25 DIAGNOSIS — I10 ESSENTIAL HYPERTENSION: Chronic | ICD-10-CM

## 2018-09-25 LAB
DIASTOLIC DYSFUNCTION: NO
MITRAL VALVE MOBILITY: NORMAL
RETIRED EF AND QEF - SEE NOTES: 60 (ref 55–65)

## 2018-09-25 PROCEDURE — 93351 STRESS TTE COMPLETE: CPT | Mod: S$GLB,,, | Performed by: INTERNAL MEDICINE

## 2018-09-25 PROCEDURE — 93320 DOPPLER ECHO COMPLETE: CPT | Mod: S$GLB,,, | Performed by: INTERNAL MEDICINE

## 2018-09-25 PROCEDURE — 93325 DOPPLER ECHO COLOR FLOW MAPG: CPT | Mod: S$GLB,,, | Performed by: INTERNAL MEDICINE

## 2018-11-05 ENCOUNTER — OFFICE VISIT (OUTPATIENT)
Dept: CARDIOLOGY | Facility: CLINIC | Age: 58
End: 2018-11-05
Payer: COMMERCIAL

## 2018-11-05 VITALS
HEIGHT: 73 IN | DIASTOLIC BLOOD PRESSURE: 86 MMHG | WEIGHT: 184.5 LBS | SYSTOLIC BLOOD PRESSURE: 122 MMHG | BODY MASS INDEX: 24.45 KG/M2 | HEART RATE: 68 BPM

## 2018-11-05 DIAGNOSIS — R07.89 ATYPICAL CHEST PAIN: ICD-10-CM

## 2018-11-05 DIAGNOSIS — Z98.61 HISTORY OF PTCA: ICD-10-CM

## 2018-11-05 DIAGNOSIS — R93.1 AGATSTON CORONARY ARTERY CALCIUM SCORE GREATER THAN 400: Primary | ICD-10-CM

## 2018-11-05 DIAGNOSIS — R06.09 DOE (DYSPNEA ON EXERTION): ICD-10-CM

## 2018-11-05 DIAGNOSIS — I25.84 CORONARY ARTERY DISEASE DUE TO CALCIFIED CORONARY LESION: ICD-10-CM

## 2018-11-05 DIAGNOSIS — E78.2 MIXED HYPERLIPIDEMIA: ICD-10-CM

## 2018-11-05 DIAGNOSIS — I10 ESSENTIAL HYPERTENSION: Chronic | ICD-10-CM

## 2018-11-05 DIAGNOSIS — I25.10 CORONARY ARTERY DISEASE DUE TO CALCIFIED CORONARY LESION: ICD-10-CM

## 2018-11-05 PROCEDURE — 99214 OFFICE O/P EST MOD 30 MIN: CPT | Mod: S$GLB,,, | Performed by: INTERNAL MEDICINE

## 2018-11-05 PROCEDURE — 99999 PR PBB SHADOW E&M-EST. PATIENT-LVL III: CPT | Mod: PBBFAC,,, | Performed by: INTERNAL MEDICINE

## 2018-11-05 PROCEDURE — 3008F BODY MASS INDEX DOCD: CPT | Mod: CPTII,S$GLB,, | Performed by: INTERNAL MEDICINE

## 2018-11-05 PROCEDURE — 3074F SYST BP LT 130 MM HG: CPT | Mod: CPTII,S$GLB,, | Performed by: INTERNAL MEDICINE

## 2018-11-05 PROCEDURE — 3079F DIAST BP 80-89 MM HG: CPT | Mod: CPTII,S$GLB,, | Performed by: INTERNAL MEDICINE

## 2018-11-05 NOTE — PROGRESS NOTES
Subjective:    Patient ID:  Hernan Fields is a 58 y.o. male who presents for evaluation of Follow-up; Hypertension; Hyperlipidemia; Coronary Artery Disease; and Risk Factor Management For Atherosclerosis      HPI Mr. Fields returns for f/u.   His current medical conditions include HCV ab +, CAD, HTN. Nonsmoker. + family h/o CAD.   H/o abnormal calcium score 1087 in 2014.   H/o  chronic atypical cp sxs  S/p PCI RCA NADINE x 2 June 2016; calcified nonobstructive LAD/left main disease as well, normal LVEF.  Here for f/u.  CAD seems stable.  No typical anginal sxs.  H/o atypical chest pain sxs.  This is stable, unchanged.  Has chronic MATIAS, unchanged. No pnd/orthopnea.  States he has exercise induced asthma in past and uses inhaler prior to exercise.  Lipids well controlled, on statin.  On aspirin.  Stress echo 9/18 shows good exercise capacity, no wall motion abnormality, normal LV function.  HTN controlled on current meds, no associated sxs.   Compliant with meds.    Current Outpatient Medications:     albuterol 90 mcg/actuation inhaler, Inhale 2 puffs into the lungs every 6 (six) hours as needed for Wheezing., Disp: 1 each, Rfl: 11    aspirin (ASPIRIN LOW DOSE) 81 MG EC tablet, Take by mouth. 1 Tablet, Delayed Release (E.C.) Oral Every day, Disp: , Rfl:     atorvastatin (LIPITOR) 80 MG tablet, TAKE 1 TABLET (80 MG TOTAL) BY MOUTH EVERY EVENING., Disp: 90 tablet, Rfl: 3    citalopram (CELEXA) 20 MG tablet, TAKE 1 TABLET (20 MG TOTAL) BY MOUTH ONCE DAILY., Disp: 90 tablet, Rfl: 0    clonazePAM (KLONOPIN) 0.5 MG tablet, Take 1 tablet (0.5 mg total) by mouth 2 (two) times daily as needed for Anxiety., Disp: 30 tablet, Rfl: 0    cyclobenzaprine (FLEXERIL) 10 MG tablet, 1/2 to one tab po qhs prn muscle spasm, Disp: 30 tablet, Rfl: 0    metoprolol succinate (TOPROL-XL) 50 MG 24 hr tablet, TAKE ONE TABLET BY MOUTH ONCE DAILY, Disp: 90 tablet, Rfl: 1    RANEXA 500 mg Tb12, TAKE 1 TABLET (500 MG TOTAL) BY MOUTH 2 (TWO)  "TIMES DAILY., Disp: 180 tablet, Rfl: 2    sildenafil (VIAGRA) 100 MG tablet, TAKE 1 TABLET BY MOUTH 20 MINUTES BEFORE SEXUAL ACTIVITY, Disp: 10 tablet, Rfl: 6    valacyclovir (VALTREX) 500 MG tablet, Take 1 tablet by mouth once daily., Disp: , Rfl: 6    naproxen (NAPROSYN) 500 MG tablet, Take 1 tablet (500 mg total) by mouth 2 (two) times daily with meals. For pain and inflammation, Disp: 30 tablet, Rfl: 0      Review of Systems   Constitution: Negative.   HENT: Negative.    Eyes: Negative.    Cardiovascular: Positive for chest pain and dyspnea on exertion.   Respiratory: Positive for shortness of breath.    Endocrine: Negative.    Hematologic/Lymphatic: Negative.    Skin: Negative.    Musculoskeletal: Negative.    Gastrointestinal: Negative.    Genitourinary: Negative.    Neurological: Negative.    Psychiatric/Behavioral: Negative.    Allergic/Immunologic: Negative.          /86 (BP Location: Right arm, Patient Position: Sitting, BP Method: Medium (Manual))   Pulse 68   Ht 6' 1" (1.854 m)   Wt 83.7 kg (184 lb 8.4 oz)   BMI 24.35 kg/m²     Wt Readings from Last 3 Encounters:   11/05/18 83.7 kg (184 lb 8.4 oz)   09/19/18 81.1 kg (178 lb 12.7 oz)   05/01/18 81.6 kg (180 lb)     Temp Readings from Last 3 Encounters:   09/19/18 97.8 °F (36.6 °C) (Oral)   01/29/18 97.6 °F (36.4 °C) (Tympanic)   09/03/17 99.7 °F (37.6 °C) (Tympanic)     BP Readings from Last 3 Encounters:   11/05/18 122/86   09/19/18 122/78   05/01/18 112/78     Pulse Readings from Last 3 Encounters:   11/05/18 68   09/19/18 63   05/01/18 63       Objective:    Physical Exam   Constitutional: He is oriented to person, place, and time. He appears well-developed and well-nourished.   HENT:   Head: Normocephalic.   Neck: Normal range of motion. Neck supple. Normal carotid pulses, no hepatojugular reflux and no JVD present. Carotid bruit is not present. No thyromegaly present.   Cardiovascular: Normal rate, regular rhythm, S1 normal and S2 " normal. PMI is not displaced. Exam reveals no S3, no S4, no distant heart sounds, no friction rub, no midsystolic click and no opening snap.   No murmur heard.  Pulses:       Radial pulses are 2+ on the right side, and 2+ on the left side.   Pulmonary/Chest: Effort normal and breath sounds normal. He has no wheezes. He has no rales.   Abdominal: Soft. Bowel sounds are normal. He exhibits no distension, no abdominal bruit, no ascites and no mass. There is no tenderness.   Musculoskeletal: He exhibits no edema.   Neurological: He is alert and oriented to person, place, and time.   Skin: Skin is warm.   Psychiatric: He has a normal mood and affect. His behavior is normal.   Nursing note and vitals reviewed.      I have reviewed all pertinent labs and cardiac studies.      Chemistry        Component Value Date/Time     09/19/2018 0925    K 5.1 09/19/2018 0925     09/19/2018 0925    CO2 28 09/19/2018 0925    BUN 17 09/19/2018 0925    CREATININE 1.0 09/19/2018 0925    GLU 96 09/19/2018 0925        Component Value Date/Time    CALCIUM 10.0 09/19/2018 0925    ALKPHOS 84 09/19/2018 0925    AST 15 09/19/2018 0925    ALT 23 09/19/2018 0925    BILITOT 0.8 09/19/2018 0925    ESTGFRAFRICA >60.0 09/19/2018 0925    EGFRNONAA >60.0 09/19/2018 0925        Lab Results   Component Value Date    WBC 6.03 09/19/2018    HGB 14.3 09/19/2018    HCT 42.2 09/19/2018    MCV 97 09/19/2018     09/19/2018     Lab Results   Component Value Date    CHOL 101 (L) 04/27/2018    CHOL 97 (L) 10/10/2017    CHOL 102 (L) 03/06/2017     Lab Results   Component Value Date    HDL 32 (L) 04/27/2018    HDL 38 (L) 10/10/2017    HDL 31 (L) 03/06/2017     Lab Results   Component Value Date    LDLCALC 54.8 (L) 04/27/2018    LDLCALC 44.6 (L) 10/10/2017    LDLCALC 58.0 (L) 03/06/2017     Lab Results   Component Value Date    TRIG 71 04/27/2018    TRIG 72 10/10/2017    TRIG 65 03/06/2017     Lab Results   Component Value Date    CHOLHDL 31.7  04/27/2018    CHOLHDL 39.2 10/10/2017    CHOLHDL 30.4 03/06/2017        Narrative     Date of Procedure: 09/25/2018    PRE-TEST DATA   EKG: Resting electrocardiogram reveals sinus rhythm at a rate of 67 bpm.     TEST DESCRIPTION   The patient exercised for 12.25 minutes on a Hany protocol, corresponding to a functional capacity of 14 estimated METS, achieving a peak heart rate of 144 bpm, which is 88% of the age predicted maximum heart rate. The patient discontinued exercise   secondary to completed protocol.     At peak exercise, EKG revealed 1mm of ST segment depression in the inferolateral leads.     EKG Conclusions:    1. The EKG portion of this study is suspicious for ischemia at a high workload, and peak heart rate of 144 bpm (88% of predicted).   2. Exercise capacity is average.   3. Blood pressure response to exercise was normal (Presenting BP: 107/68 Peak BP: 163/88).   4. No significant arrhythmias were present.   5. There were no symptoms of chest discomfort or significant dyspnea throughout the protocol.   6. The Duke treadmill score was 7 suggesting a low probability for future cardiovascular events.    Echocardiographic Description:  Technical Quality: This is a technically adequate study.     Aorta: The aortic root is normal in size, measuring 3.3 cm at sinotubular junction and 3.5 cm at Sinuses of Valsalva. The proximal ascending aorta is normal in size, measuring 3.4 cm across.     Left Atrium: The left atrial volume index is normal, measuring 20.83 cc/m2.     Left Ventricle: The left ventricle is normal in size, with an end-diastolic diameter of 3.8 cm, and an end-systolic diameter of 2.6 cm. LV wall thickness is normal, with the septum measuring 1.4 cm and the posterior wall measuring 1.2 cm across. Relative   wall thickness was increased at 0.63, and the LV mass index was 98.5 g/m2 consistent with concentric remodeling. There are no regional wall motion abnormalities. Left ventricular systolic  function appears normal. Visually estimated ejection fraction is   60-65%. The LV Doppler derived stroke volume equals 76.0 ccs.     Diastolic indices: E wave velocity 0.9 m/s, E/A ratio 1.3,  msec., E/e' ratio(avg) 7. Diastolic function is normal.     Right Atrium: The right atrium is normal in size, measuring 4.2 cm in length and 3.8 cm in width in the apical view.     Right Ventricle: The right ventricle is normal in size measuring 2.8 cm at the base in the apical right ventricle-focused view. Global right ventricular systolic function appears normal.     Aortic Valve:  The aortic valve is normal in structure with normal leaflet mobility.     Mitral Valve:  The mitral valve is normal in structure with normal leaflet mobility.     Tricuspid Valve:  The tricuspid valve is normal in structure with normal leaflet mobility.     Pulmonary Valve:  The pulmonic valve is not well seen.     IVC: IVC is normal in size and collapses > 50% with a sniff, suggesting normal right atrial pressure of 3 mmHg.     Intracavitary: There is no evidence of pericardial effusion, intracavity mass, thrombi, or vegetation.     Post Exercise Imaging:    Immediate post exercise images demonstrate left ventricular function augmenting. Left ventricular end systolic volume decreases.     The left ventricle is globally hyperdynamic and no exercise induced wall motion abnormalities were identified.       CONCLUSIONS     1 - Normal left ventricular systolic function (EF 60-65%).     2 - Normal left ventricular diastolic function.     3 - No wall motion abnormalities.     4 - Concentric remodeling.     5 - Normal right ventricular systolic function .     No evidence of stress induced myocardial ischemia.         This document has been electronically    SIGNED BY: Darryn Marrero MD On: 09/25/2018 11:23               Assessment:       1. Agatston coronary artery calcium score greater than 400    2. Essential hypertension    3. History of PTCA     4. Coronary artery disease due to calcified coronary lesion    5. Mixed hyperlipidemia    6. Atypical chest pain    7. MATIAS (dyspnea on exertion)         Plan:             Stable CV conditions on current medical tx.  Reviewed all tests with pt.  No ischemia on stress echo images.   Chronic atypical cp, stable. Monitor in future.  Continue medical tx for CAD.  Inhalers prn for asthma related MATIAS.  Cardiac diet  Exercise daily  No changes in meds today.  F/u 6 months with CMP, FLP, CRP.

## 2019-04-19 DIAGNOSIS — I25.10 CORONARY ARTERY DISEASE DUE TO LIPID RICH PLAQUE: ICD-10-CM

## 2019-04-19 DIAGNOSIS — I25.83 CORONARY ARTERY DISEASE DUE TO LIPID RICH PLAQUE: ICD-10-CM

## 2019-04-19 RX ORDER — RANOLAZINE 500 MG/1
TABLET, FILM COATED, EXTENDED RELEASE ORAL
Qty: 180 TABLET | Refills: 2 | Status: SHIPPED | OUTPATIENT
Start: 2019-04-19 | End: 2020-01-26

## 2019-05-07 ENCOUNTER — LAB VISIT (OUTPATIENT)
Dept: LAB | Facility: HOSPITAL | Age: 59
End: 2019-05-07
Attending: INTERNAL MEDICINE
Payer: COMMERCIAL

## 2019-05-07 DIAGNOSIS — E78.2 MIXED HYPERLIPIDEMIA: ICD-10-CM

## 2019-05-07 DIAGNOSIS — I25.10 CORONARY ARTERY DISEASE DUE TO CALCIFIED CORONARY LESION: ICD-10-CM

## 2019-05-07 DIAGNOSIS — I10 ESSENTIAL HYPERTENSION: Primary | ICD-10-CM

## 2019-05-07 DIAGNOSIS — I25.84 CORONARY ARTERY DISEASE DUE TO CALCIFIED CORONARY LESION: ICD-10-CM

## 2019-05-07 LAB
ALBUMIN SERPL BCP-MCNC: 4.2 G/DL (ref 3.5–5.2)
ALP SERPL-CCNC: 66 U/L (ref 55–135)
ALT SERPL W/O P-5'-P-CCNC: 39 U/L (ref 10–44)
ANION GAP SERPL CALC-SCNC: 8 MMOL/L (ref 8–16)
AST SERPL-CCNC: 27 U/L (ref 10–40)
BILIRUB SERPL-MCNC: 0.9 MG/DL (ref 0.1–1)
BUN SERPL-MCNC: 19 MG/DL (ref 6–20)
CALCIUM SERPL-MCNC: 10.1 MG/DL (ref 8.7–10.5)
CHLORIDE SERPL-SCNC: 104 MMOL/L (ref 95–110)
CHOLEST SERPL-MCNC: 123 MG/DL (ref 120–199)
CHOLEST/HDLC SERPL: 3.3 {RATIO} (ref 2–5)
CO2 SERPL-SCNC: 30 MMOL/L (ref 23–29)
CREAT SERPL-MCNC: 1.1 MG/DL (ref 0.5–1.4)
CRP SERPL-MCNC: 0.43 MG/L (ref 0–3.19)
EST. GFR  (AFRICAN AMERICAN): >60 ML/MIN/1.73 M^2
EST. GFR  (NON AFRICAN AMERICAN): >60 ML/MIN/1.73 M^2
GLUCOSE SERPL-MCNC: 93 MG/DL (ref 70–110)
HDLC SERPL-MCNC: 37 MG/DL (ref 40–75)
HDLC SERPL: 30.1 % (ref 20–50)
LDLC SERPL CALC-MCNC: 59.2 MG/DL (ref 63–159)
NONHDLC SERPL-MCNC: 86 MG/DL
POTASSIUM SERPL-SCNC: 4.5 MMOL/L (ref 3.5–5.1)
PROT SERPL-MCNC: 7 G/DL (ref 6–8.4)
SODIUM SERPL-SCNC: 142 MMOL/L (ref 136–145)
TRIGL SERPL-MCNC: 134 MG/DL (ref 30–150)

## 2019-05-07 PROCEDURE — 36415 COLL VENOUS BLD VENIPUNCTURE: CPT | Mod: PO

## 2019-05-07 PROCEDURE — 86141 C-REACTIVE PROTEIN HS: CPT

## 2019-05-07 PROCEDURE — 80053 COMPREHEN METABOLIC PANEL: CPT

## 2019-05-07 PROCEDURE — 80061 LIPID PANEL: CPT

## 2019-05-13 ENCOUNTER — OFFICE VISIT (OUTPATIENT)
Dept: CARDIOLOGY | Facility: CLINIC | Age: 59
End: 2019-05-13
Payer: COMMERCIAL

## 2019-05-13 ENCOUNTER — CLINICAL SUPPORT (OUTPATIENT)
Dept: CARDIOLOGY | Facility: CLINIC | Age: 59
End: 2019-05-13
Payer: COMMERCIAL

## 2019-05-13 VITALS
WEIGHT: 187 LBS | DIASTOLIC BLOOD PRESSURE: 70 MMHG | RESPIRATION RATE: 16 BRPM | SYSTOLIC BLOOD PRESSURE: 118 MMHG | BODY MASS INDEX: 24.67 KG/M2 | HEART RATE: 64 BPM

## 2019-05-13 DIAGNOSIS — E78.2 MIXED HYPERLIPIDEMIA: ICD-10-CM

## 2019-05-13 DIAGNOSIS — I10 ESSENTIAL HYPERTENSION: ICD-10-CM

## 2019-05-13 DIAGNOSIS — I10 ESSENTIAL HYPERTENSION: Primary | Chronic | ICD-10-CM

## 2019-05-13 DIAGNOSIS — I25.10 CORONARY ARTERY DISEASE DUE TO CALCIFIED CORONARY LESION: ICD-10-CM

## 2019-05-13 DIAGNOSIS — I25.84 CORONARY ARTERY DISEASE DUE TO CALCIFIED CORONARY LESION: ICD-10-CM

## 2019-05-13 DIAGNOSIS — R07.89 ATYPICAL CHEST PAIN: ICD-10-CM

## 2019-05-13 DIAGNOSIS — R93.1 AGATSTON CORONARY ARTERY CALCIUM SCORE GREATER THAN 400: ICD-10-CM

## 2019-05-13 DIAGNOSIS — Z98.61 HISTORY OF PTCA: ICD-10-CM

## 2019-05-13 DIAGNOSIS — R06.09 DOE (DYSPNEA ON EXERTION): ICD-10-CM

## 2019-05-13 PROCEDURE — 3074F SYST BP LT 130 MM HG: CPT | Mod: CPTII,S$GLB,, | Performed by: INTERNAL MEDICINE

## 2019-05-13 PROCEDURE — 99999 PR PBB SHADOW E&M-EST. PATIENT-LVL III: ICD-10-PCS | Mod: PBBFAC,,, | Performed by: INTERNAL MEDICINE

## 2019-05-13 PROCEDURE — 3078F PR MOST RECENT DIASTOLIC BLOOD PRESSURE < 80 MM HG: ICD-10-PCS | Mod: CPTII,S$GLB,, | Performed by: INTERNAL MEDICINE

## 2019-05-13 PROCEDURE — 3008F PR BODY MASS INDEX (BMI) DOCUMENTED: ICD-10-PCS | Mod: CPTII,S$GLB,, | Performed by: INTERNAL MEDICINE

## 2019-05-13 PROCEDURE — 99214 PR OFFICE/OUTPT VISIT, EST, LEVL IV, 30-39 MIN: ICD-10-PCS | Mod: S$GLB,,, | Performed by: INTERNAL MEDICINE

## 2019-05-13 PROCEDURE — 93000 ELECTROCARDIOGRAM COMPLETE: CPT | Mod: S$GLB,,, | Performed by: NUCLEAR MEDICINE

## 2019-05-13 PROCEDURE — 99214 OFFICE O/P EST MOD 30 MIN: CPT | Mod: S$GLB,,, | Performed by: INTERNAL MEDICINE

## 2019-05-13 PROCEDURE — 93000 EKG 12-LEAD: ICD-10-PCS | Mod: S$GLB,,, | Performed by: NUCLEAR MEDICINE

## 2019-05-13 PROCEDURE — 99999 PR PBB SHADOW E&M-EST. PATIENT-LVL III: CPT | Mod: PBBFAC,,, | Performed by: INTERNAL MEDICINE

## 2019-05-13 PROCEDURE — 3074F PR MOST RECENT SYSTOLIC BLOOD PRESSURE < 130 MM HG: ICD-10-PCS | Mod: CPTII,S$GLB,, | Performed by: INTERNAL MEDICINE

## 2019-05-13 PROCEDURE — 3008F BODY MASS INDEX DOCD: CPT | Mod: CPTII,S$GLB,, | Performed by: INTERNAL MEDICINE

## 2019-05-13 PROCEDURE — 3078F DIAST BP <80 MM HG: CPT | Mod: CPTII,S$GLB,, | Performed by: INTERNAL MEDICINE

## 2019-05-13 NOTE — PROGRESS NOTES
Subjective:    Patient ID:  Hernan Fields is a 58 y.o. male who presents for evaluation of Hyperlipidemia; Coronary Artery Disease; Risk Factor Management For Atherosclerosis; Hypertension; Chest Pain; and Shortness of Breath          HPI  Pt presents for f/u.  His current medical conditions include HCV ab +, CAD, dyslipidemia, HTN. Nonsmoker. + family h/o CAD.   H/o abnormal calcium score 1087 in 2014.   H/o  chronic atypical cp sxs  S/p PCI RCA NADINE x 2 June 2016; calcified nonobstructive LAD/left main disease as well, normal LVEF.  Stress echo 9/18 shows good exercise capacity, no wall motion abnormality, normal LV function.  Now here.  He has chronic atypical cp sxs.  This seems very infrequent and has not changed in severity and/or frequency.  No CHF sxs.   He has some chronic dyspnea, unchanged.  No pnd/orthopnea.  ecg today reviewed and is normal.  LDL is excellent < 70 on statin tx.  Compliant with meds.  Compliant with meds.  Some exercise.   Weight up some over last year due to working a lot, sedentary a bit.  HTN well controlled on current therapy, no associated sxs.       Current Outpatient Medications:     albuterol 90 mcg/actuation inhaler, Inhale 2 puffs into the lungs every 6 (six) hours as needed for Wheezing., Disp: 1 each, Rfl: 11    aspirin (ASPIRIN LOW DOSE) 81 MG EC tablet, Take by mouth. 1 Tablet, Delayed Release (E.C.) Oral Every day, Disp: , Rfl:     atorvastatin (LIPITOR) 80 MG tablet, TAKE 1 TABLET (80 MG TOTAL) BY MOUTH EVERY EVENING., Disp: 90 tablet, Rfl: 3    citalopram (CELEXA) 20 MG tablet, TAKE 1 TABLET (20 MG TOTAL) BY MOUTH ONCE DAILY., Disp: 90 tablet, Rfl: 0    clonazePAM (KLONOPIN) 0.5 MG tablet, Take 1 tablet (0.5 mg total) by mouth 2 (two) times daily as needed for Anxiety., Disp: 30 tablet, Rfl: 0    cyclobenzaprine (FLEXERIL) 10 MG tablet, 1/2 to one tab po qhs prn muscle spasm, Disp: 30 tablet, Rfl: 0    metoprolol succinate (TOPROL-XL) 50 MG 24 hr tablet, TAKE ONE  TABLET BY MOUTH ONCE DAILY, Disp: 90 tablet, Rfl: 1    naproxen (NAPROSYN) 500 MG tablet, Take 1 tablet (500 mg total) by mouth 2 (two) times daily with meals. For pain and inflammation, Disp: 30 tablet, Rfl: 0    RANEXA 500 mg Tb12, TAKE 1 TABLET BY MOUTH 2 TIMES DAILY., Disp: 180 tablet, Rfl: 2    sildenafil (VIAGRA) 100 MG tablet, TAKE 1 TABLET BY MOUTH 20 MINUTES BEFORE SEXUAL ACTIVITY, Disp: 10 tablet, Rfl: 6    valacyclovir (VALTREX) 500 MG tablet, Take 1 tablet by mouth once daily., Disp: , Rfl: 6      Review of Systems   Constitution: Positive for weight gain.   HENT: Negative.    Eyes: Negative.    Cardiovascular: Positive for chest pain and dyspnea on exertion.   Respiratory: Positive for shortness of breath.    Endocrine: Negative.    Hematologic/Lymphatic: Negative.    Skin: Negative.    Musculoskeletal: Negative.    Gastrointestinal: Negative.    Genitourinary: Negative.    Neurological: Negative.    Psychiatric/Behavioral: Negative.    Allergic/Immunologic: Negative.        /70   Pulse 64   Resp 16   Wt 84.8 kg (187 lb)   BMI 24.67 kg/m²     Wt Readings from Last 3 Encounters:   05/13/19 84.8 kg (187 lb)   11/05/18 83.7 kg (184 lb 8.4 oz)   09/19/18 81.1 kg (178 lb 12.7 oz)     Temp Readings from Last 3 Encounters:   09/19/18 97.8 °F (36.6 °C) (Oral)   01/29/18 97.6 °F (36.4 °C) (Tympanic)   09/03/17 99.7 °F (37.6 °C) (Tympanic)     BP Readings from Last 3 Encounters:   05/13/19 118/70   11/05/18 122/86   09/19/18 122/78     Pulse Readings from Last 3 Encounters:   05/13/19 64   11/05/18 68   09/19/18 63          Objective:    Physical Exam   Constitutional: He is oriented to person, place, and time. He appears well-developed and well-nourished.   HENT:   Head: Normocephalic.   Neck: Normal range of motion. Neck supple. Normal carotid pulses, no hepatojugular reflux and no JVD present. Carotid bruit is not present. No thyromegaly present.   Cardiovascular: Normal rate, regular rhythm, S1  normal and S2 normal. PMI is not displaced. Exam reveals no S3, no S4, no distant heart sounds, no friction rub, no midsystolic click and no opening snap.   No murmur heard.  Pulses:       Radial pulses are 2+ on the right side, and 2+ on the left side.   Pulmonary/Chest: Effort normal and breath sounds normal. He has no wheezes. He has no rales.   Abdominal: Soft. Bowel sounds are normal. He exhibits no distension, no abdominal bruit, no ascites and no mass. There is no tenderness.   Musculoskeletal: He exhibits no edema.   Neurological: He is alert and oriented to person, place, and time.   Skin: Skin is warm.   Psychiatric: He has a normal mood and affect. His behavior is normal.   Nursing note and vitals reviewed.      I have reviewed all pertinent labs and cardiac studies.        Chemistry        Component Value Date/Time     05/07/2019 0721    K 4.5 05/07/2019 0721     05/07/2019 0721    CO2 30 (H) 05/07/2019 0721    BUN 19 05/07/2019 0721    CREATININE 1.1 05/07/2019 0721    GLU 93 05/07/2019 0721        Component Value Date/Time    CALCIUM 10.1 05/07/2019 0721    ALKPHOS 66 05/07/2019 0721    AST 27 05/07/2019 0721    ALT 39 05/07/2019 0721    BILITOT 0.9 05/07/2019 0721    ESTGFRAFRICA >60.0 05/07/2019 0721    EGFRNONAA >60.0 05/07/2019 0721        Lab Results   Component Value Date    WBC 6.03 09/19/2018    HGB 14.3 09/19/2018    HCT 42.2 09/19/2018    MCV 97 09/19/2018     09/19/2018     Lab Results   Component Value Date    CHOL 123 05/07/2019    CHOL 101 (L) 04/27/2018    CHOL 97 (L) 10/10/2017     Lab Results   Component Value Date    HDL 37 (L) 05/07/2019    HDL 32 (L) 04/27/2018    HDL 38 (L) 10/10/2017     Lab Results   Component Value Date    LDLCALC 59.2 (L) 05/07/2019    LDLCALC 54.8 (L) 04/27/2018    LDLCALC 44.6 (L) 10/10/2017     Lab Results   Component Value Date    TRIG 134 05/07/2019    TRIG 71 04/27/2018    TRIG 72 10/10/2017     Lab Results   Component Value Date     CHOLHDL 30.1 05/07/2019    CHOLHDL 31.7 04/27/2018    CHOLHDL 39.2 10/10/2017           Assessment:       1. Essential hypertension    2. History of PTCA    3. Coronary artery disease due to calcified coronary lesion    4. Mixed hyperlipidemia    5. Agatston coronary artery calcium score greater than 400    6. Atypical chest pain    7. MATIAS (dyspnea on exertion)         Plan:             Stable CV conditions on current medical tx.  Reviewed tests with pt and above medical conditions and formulated tx plan.  Continue medical tx for CAD.  Cardiac diet discussed.  Weight loss/control discussed.  Daily exercise, goal 30 + minutes.  F/u 6 months with lipids.

## 2019-05-19 RX ORDER — METOPROLOL SUCCINATE 50 MG/1
TABLET, EXTENDED RELEASE ORAL
Qty: 90 TABLET | Refills: 3 | Status: SHIPPED | OUTPATIENT
Start: 2019-05-19 | End: 2020-05-14

## 2019-09-30 ENCOUNTER — OFFICE VISIT (OUTPATIENT)
Dept: INTERNAL MEDICINE | Facility: CLINIC | Age: 59
End: 2019-09-30
Payer: COMMERCIAL

## 2019-09-30 VITALS
HEART RATE: 76 BPM | TEMPERATURE: 98 F | WEIGHT: 192 LBS | OXYGEN SATURATION: 98 % | DIASTOLIC BLOOD PRESSURE: 80 MMHG | BODY MASS INDEX: 25.33 KG/M2 | SYSTOLIC BLOOD PRESSURE: 116 MMHG

## 2019-09-30 DIAGNOSIS — F41.9 ANXIETY: ICD-10-CM

## 2019-09-30 DIAGNOSIS — E78.2 MIXED HYPERLIPIDEMIA: ICD-10-CM

## 2019-09-30 DIAGNOSIS — I25.84 CORONARY ARTERY DISEASE DUE TO CALCIFIED CORONARY LESION: ICD-10-CM

## 2019-09-30 DIAGNOSIS — I25.10 CORONARY ARTERY DISEASE DUE TO LIPID RICH PLAQUE: ICD-10-CM

## 2019-09-30 DIAGNOSIS — I25.10 CORONARY ARTERY DISEASE DUE TO CALCIFIED CORONARY LESION: ICD-10-CM

## 2019-09-30 DIAGNOSIS — N52.8 OTHER MALE ERECTILE DYSFUNCTION: ICD-10-CM

## 2019-09-30 DIAGNOSIS — R93.1 AGATSTON CORONARY ARTERY CALCIUM SCORE GREATER THAN 400: ICD-10-CM

## 2019-09-30 DIAGNOSIS — Z00.00 ROUTINE GENERAL MEDICAL EXAMINATION AT A HEALTH CARE FACILITY: Primary | ICD-10-CM

## 2019-09-30 DIAGNOSIS — J98.01 BRONCHOSPASM: ICD-10-CM

## 2019-09-30 DIAGNOSIS — I25.83 CORONARY ARTERY DISEASE DUE TO LIPID RICH PLAQUE: ICD-10-CM

## 2019-09-30 DIAGNOSIS — I10 ESSENTIAL HYPERTENSION: Chronic | ICD-10-CM

## 2019-09-30 DIAGNOSIS — Z23 NEED FOR INFLUENZA VACCINATION: ICD-10-CM

## 2019-09-30 PROBLEM — R06.09 DOE (DYSPNEA ON EXERTION): Status: RESOLVED | Noted: 2018-11-05 | Resolved: 2019-09-30

## 2019-09-30 PROBLEM — R07.89 ATYPICAL CHEST PAIN: Status: RESOLVED | Noted: 2018-11-05 | Resolved: 2019-09-30

## 2019-09-30 PROCEDURE — 90686 IIV4 VACC NO PRSV 0.5 ML IM: CPT | Mod: S$GLB,,, | Performed by: INTERNAL MEDICINE

## 2019-09-30 PROCEDURE — 99999 PR PBB SHADOW E&M-EST. PATIENT-LVL III: CPT | Mod: PBBFAC,,, | Performed by: INTERNAL MEDICINE

## 2019-09-30 PROCEDURE — 90471 IMMUNIZATION ADMIN: CPT | Mod: S$GLB,,, | Performed by: INTERNAL MEDICINE

## 2019-09-30 PROCEDURE — 3079F PR MOST RECENT DIASTOLIC BLOOD PRESSURE 80-89 MM HG: ICD-10-PCS | Mod: CPTII,S$GLB,, | Performed by: INTERNAL MEDICINE

## 2019-09-30 PROCEDURE — 3074F PR MOST RECENT SYSTOLIC BLOOD PRESSURE < 130 MM HG: ICD-10-PCS | Mod: CPTII,S$GLB,, | Performed by: INTERNAL MEDICINE

## 2019-09-30 PROCEDURE — 99999 PR PBB SHADOW E&M-EST. PATIENT-LVL III: ICD-10-PCS | Mod: PBBFAC,,, | Performed by: INTERNAL MEDICINE

## 2019-09-30 PROCEDURE — 3079F DIAST BP 80-89 MM HG: CPT | Mod: CPTII,S$GLB,, | Performed by: INTERNAL MEDICINE

## 2019-09-30 PROCEDURE — 99396 PR PREVENTIVE VISIT,EST,40-64: ICD-10-PCS | Mod: 25,S$GLB,, | Performed by: INTERNAL MEDICINE

## 2019-09-30 PROCEDURE — 90686 FLU VACCINE (QUAD) GREATER THAN OR EQUAL TO 3YO PRESERVATIVE FREE IM: ICD-10-PCS | Mod: S$GLB,,, | Performed by: INTERNAL MEDICINE

## 2019-09-30 PROCEDURE — 3074F SYST BP LT 130 MM HG: CPT | Mod: CPTII,S$GLB,, | Performed by: INTERNAL MEDICINE

## 2019-09-30 PROCEDURE — 90471 FLU VACCINE (QUAD) GREATER THAN OR EQUAL TO 3YO PRESERVATIVE FREE IM: ICD-10-PCS | Mod: S$GLB,,, | Performed by: INTERNAL MEDICINE

## 2019-09-30 PROCEDURE — 99396 PREV VISIT EST AGE 40-64: CPT | Mod: 25,S$GLB,, | Performed by: INTERNAL MEDICINE

## 2019-09-30 RX ORDER — ATORVASTATIN CALCIUM 80 MG/1
80 TABLET, FILM COATED ORAL NIGHTLY
Qty: 90 TABLET | Refills: 3 | Status: SHIPPED | OUTPATIENT
Start: 2019-09-30 | End: 2020-08-19 | Stop reason: SDUPTHER

## 2019-09-30 RX ORDER — ALBUTEROL SULFATE 90 UG/1
2 AEROSOL, METERED RESPIRATORY (INHALATION) EVERY 6 HOURS PRN
Qty: 1 EACH | Refills: 5 | Status: SHIPPED | OUTPATIENT
Start: 2019-09-30 | End: 2020-08-19 | Stop reason: SDUPTHER

## 2019-09-30 RX ORDER — CLONAZEPAM 0.5 MG/1
0.5 TABLET ORAL 2 TIMES DAILY PRN
Qty: 30 TABLET | Refills: 0 | Status: SHIPPED | OUTPATIENT
Start: 2019-09-30 | End: 2020-12-02 | Stop reason: SDUPTHER

## 2019-09-30 RX ORDER — SILDENAFIL 100 MG/1
TABLET, FILM COATED ORAL
Qty: 10 TABLET | Refills: 6 | Status: SHIPPED | OUTPATIENT
Start: 2019-09-30 | End: 2020-09-13

## 2019-10-06 NOTE — PROGRESS NOTES
Subjective:      Patient ID: Hernan Fields is a 59 y.o. male.    Chief Complaint: Annual Exam    HPI   60 yo with   Patient Active Problem List   Diagnosis    HTN (hypertension)    Anxiety    Agatston coronary artery calcium score greater than 400    Mixed hyperlipidemia    False positive serological test for hepatitis C    Coronary artery disease    History of PTCA    Other male erectile dysfunction     Past Medical History:   Diagnosis Date    Allergy     Anxiety     public speaking issues.    Atypical chest pain 11/5/2018    Coronary artery disease 12/15/2014    dr alexandra    Elevated PSA     Ex-smoker     one year at 20 y/o    False positive serological test for hepatitis C     H/O: Bell's palsy     affects left side    History of PTCA 9/12/2016    Hypertension     Meningitis     Mixed hyperlipidemia 12/15/2014     Here today for annual prev exam.  Compliant with meds without significant side effects. Energy and appetite are good.   No new c/o    Current Outpatient Medications   Medication Sig Dispense Refill    albuterol (PROVENTIL/VENTOLIN HFA) 90 mcg/actuation inhaler Inhale 2 puffs into the lungs every 6 (six) hours as needed for Wheezing or Shortness of Breath. 1 each 5    aspirin (ASPIRIN LOW DOSE) 81 MG EC tablet Take by mouth. 1 Tablet, Delayed Release (E.C.) Oral Every day      atorvastatin (LIPITOR) 80 MG tablet Take 1 tablet (80 mg total) by mouth every evening. 90 tablet 3    clonazePAM (KLONOPIN) 0.5 MG tablet Take 1 tablet (0.5 mg total) by mouth 2 (two) times daily as needed for Anxiety. 30 tablet 0    cyclobenzaprine (FLEXERIL) 10 MG tablet 1/2 to one tab po qhs prn muscle spasm 30 tablet 0    metoprolol succinate (TOPROL-XL) 50 MG 24 hr tablet TAKE ONE TABLET BY MOUTH ONCE DAILY 90 tablet 1    RANEXA 500 mg Tb12 TAKE 1 TABLET BY MOUTH 2 TIMES DAILY. 180 tablet 2    sildenafil (VIAGRA) 100 MG tablet TAKE 1 TABLET BY MOUTH 20 MINUTES BEFORE SEXUAL ACTIVITY 10 tablet 6     valacyclovir (VALTREX) 500 MG tablet Take 1 tablet by mouth once daily.  6    metoprolol succinate (TOPROL-XL) 50 MG 24 hr tablet TAKE ONE TABLET BY MOUTH ONCE DAILY (Patient not taking: Reported on 9/30/2019) 90 tablet 3    naproxen (NAPROSYN) 500 MG tablet Take 1 tablet (500 mg total) by mouth 2 (two) times daily with meals. For pain and inflammation (Patient not taking: Reported on 9/30/2019) 30 tablet 0     No current facility-administered medications for this visit.        Review of Systems   Constitutional: Negative for chills and fever.   HENT: Negative for ear pain and sore throat.    Respiratory: Negative for cough.    Cardiovascular: Negative for chest pain.   Gastrointestinal: Negative for abdominal pain and blood in stool.   Genitourinary: Negative for dysuria and hematuria.   Neurological: Negative for seizures and syncope.     Objective:   /80 (BP Location: Left arm, Patient Position: Sitting, BP Method: Large (Manual))   Pulse 76   Temp 98.4 °F (36.9 °C) (Tympanic)   Wt 87.1 kg (192 lb 0.3 oz)   SpO2 98%   BMI 25.33 kg/m²     Physical Exam    Assessment:     1. Routine general medical examination at a health care facility    2. Bronchospasm    3. Anxiety    4. Other male erectile dysfunction    5. Coronary artery disease due to lipid rich plaque    6. Essential hypertension    7. Agatston coronary artery calcium score greater than 400    8. Coronary artery disease due to calcified coronary lesion    9. Mixed hyperlipidemia    10. Need for influenza vaccination      Plan:   Routine general medical examination at a health care facility  Heart healthy diet and reg exercise   reviewed  -     Hemoglobin A1c; Future; Expected date: 09/30/2019  -     PSA, Screening; Future; Expected date: 09/30/2019    Bronchospasm  Comments:  only with exercise  alb helps  Orders:  -     albuterol (PROVENTIL/VENTOLIN HFA) 90 mcg/actuation inhaler; Inhale 2 puffs into the lungs every 6 (six) hours as needed  for Wheezing or Shortness of Breath.  Dispense: 1 each; Refill: 5    Anxiety  Comments:  stable  Orders:  -     clonazePAM (KLONOPIN) 0.5 MG tablet; Take 1 tablet (0.5 mg total) by mouth 2 (two) times daily as needed for Anxiety.  Dispense: 30 tablet; Refill: 0    Other male erectile dysfunction  -     sildenafil (VIAGRA) 100 MG tablet; TAKE 1 TABLET BY MOUTH 20 MINUTES BEFORE SEXUAL ACTIVITY  Dispense: 10 tablet; Refill: 6    Coronary artery disease due to lipid rich plaque  Stable  Cont current meds  -     atorvastatin (LIPITOR) 80 MG tablet; Take 1 tablet (80 mg total) by mouth every evening.  Dispense: 90 tablet; Refill: 3    Essential hypertension  Controlled    Agatston coronary artery calcium score greater than 400    Coronary artery disease due to calcified coronary lesion    Mixed hyperlipidemia    Need for influenza vaccination    Other orders  -     Influenza - Quadrivalent (PF)        Lab Frequency Next Occurrence   Comprehensive metabolic panel Once 11/13/2019   Lipid panel Once 11/13/2019   Hemoglobin A1c Once 09/30/2019   PSA, Screening Once 09/30/2019       Problem List Items Addressed This Visit        Psychiatric    Anxiety    Relevant Medications    clonazePAM (KLONOPIN) 0.5 MG tablet       Cardiac/Vascular    Agatston coronary artery calcium score greater than 400    Mixed hyperlipidemia    Coronary artery disease    Relevant Medications    atorvastatin (LIPITOR) 80 MG tablet    HTN (hypertension) (Chronic)       Renal/    Other male erectile dysfunction    Relevant Medications    sildenafil (VIAGRA) 100 MG tablet      Other Visit Diagnoses     Routine general medical examination at a health care facility    -  Primary    Relevant Orders    Hemoglobin A1c    PSA, Screening    Bronchospasm        only with exercise  alb helps    Relevant Medications    albuterol (PROVENTIL/VENTOLIN HFA) 90 mcg/actuation inhaler    Need for influenza vaccination              Follow up in about 1 year (around  9/30/2020), or if symptoms worsen or fail to improve.

## 2019-11-04 ENCOUNTER — LAB VISIT (OUTPATIENT)
Dept: LAB | Facility: HOSPITAL | Age: 59
End: 2019-11-04
Attending: INTERNAL MEDICINE
Payer: COMMERCIAL

## 2019-11-04 ENCOUNTER — OFFICE VISIT (OUTPATIENT)
Dept: INTERNAL MEDICINE | Facility: CLINIC | Age: 59
End: 2019-11-04
Payer: COMMERCIAL

## 2019-11-04 VITALS
DIASTOLIC BLOOD PRESSURE: 80 MMHG | SYSTOLIC BLOOD PRESSURE: 128 MMHG | WEIGHT: 192.88 LBS | BODY MASS INDEX: 25.45 KG/M2 | HEART RATE: 73 BPM | OXYGEN SATURATION: 98 % | TEMPERATURE: 97 F

## 2019-11-04 DIAGNOSIS — J06.9 UPPER RESPIRATORY TRACT INFECTION, UNSPECIFIED TYPE: Primary | ICD-10-CM

## 2019-11-04 DIAGNOSIS — Z00.00 ROUTINE GENERAL MEDICAL EXAMINATION AT A HEALTH CARE FACILITY: ICD-10-CM

## 2019-11-04 LAB — COMPLEXED PSA SERPL-MCNC: 1 NG/ML (ref 0–4)

## 2019-11-04 PROCEDURE — 84153 ASSAY OF PSA TOTAL: CPT

## 2019-11-04 PROCEDURE — 3079F PR MOST RECENT DIASTOLIC BLOOD PRESSURE 80-89 MM HG: ICD-10-PCS | Mod: CPTII,S$GLB,, | Performed by: INTERNAL MEDICINE

## 2019-11-04 PROCEDURE — 3074F PR MOST RECENT SYSTOLIC BLOOD PRESSURE < 130 MM HG: ICD-10-PCS | Mod: CPTII,S$GLB,, | Performed by: INTERNAL MEDICINE

## 2019-11-04 PROCEDURE — 99999 PR PBB SHADOW E&M-EST. PATIENT-LVL III: CPT | Mod: PBBFAC,,, | Performed by: INTERNAL MEDICINE

## 2019-11-04 PROCEDURE — 3008F PR BODY MASS INDEX (BMI) DOCUMENTED: ICD-10-PCS | Mod: CPTII,S$GLB,, | Performed by: INTERNAL MEDICINE

## 2019-11-04 PROCEDURE — 99213 OFFICE O/P EST LOW 20 MIN: CPT | Mod: S$GLB,,, | Performed by: INTERNAL MEDICINE

## 2019-11-04 PROCEDURE — 3074F SYST BP LT 130 MM HG: CPT | Mod: CPTII,S$GLB,, | Performed by: INTERNAL MEDICINE

## 2019-11-04 PROCEDURE — 3079F DIAST BP 80-89 MM HG: CPT | Mod: CPTII,S$GLB,, | Performed by: INTERNAL MEDICINE

## 2019-11-04 PROCEDURE — 99213 PR OFFICE/OUTPT VISIT, EST, LEVL III, 20-29 MIN: ICD-10-PCS | Mod: S$GLB,,, | Performed by: INTERNAL MEDICINE

## 2019-11-04 PROCEDURE — 83036 HEMOGLOBIN GLYCOSYLATED A1C: CPT

## 2019-11-04 PROCEDURE — 36415 COLL VENOUS BLD VENIPUNCTURE: CPT

## 2019-11-04 PROCEDURE — 99999 PR PBB SHADOW E&M-EST. PATIENT-LVL III: ICD-10-PCS | Mod: PBBFAC,,, | Performed by: INTERNAL MEDICINE

## 2019-11-04 PROCEDURE — 3008F BODY MASS INDEX DOCD: CPT | Mod: CPTII,S$GLB,, | Performed by: INTERNAL MEDICINE

## 2019-11-04 NOTE — PATIENT INSTRUCTIONS
flonase nasal spray 2 spays each nostril for nasal congestion, post nasal drip, runny nose    Delsym as needed for cough    Allegra or zyrtec for allergies, post nasal drip, runny nose, watery eyes.    cepacol throat lozenges and or chloraseptic spray for sore throat    mucinex for chest congestion.     Tylenol or ibuprofen for pain or fever.

## 2019-11-04 NOTE — PROGRESS NOTES
Subjective:      Patient ID: Hernan Fields is a 59 y.o. male.    Chief Complaint: Nasal Congestion    URI    This is a new problem. Episode onset: 3 days. The problem has been unchanged. There has been no fever. Associated symptoms include congestion (nasal congestion), coughing (dry), headaches (head pressure), a plugged ear sensation, rhinorrhea (mild, clear), sneezing and a sore throat (scratchy and improving). Pertinent negatives include no abdominal pain, chest pain, dysuria, ear pain, nausea, rash, sinus pain or wheezing. Associated symptoms comments: Sneezing, ear pressure and popping. . Treatments tried: robitussin dm. The treatment provided no relief.     Review of Systems   Constitutional: Negative for chills and fever.   HENT: Positive for congestion (nasal congestion), postnasal drip, rhinorrhea (mild, clear), sneezing and sore throat (scratchy and improving). Negative for ear pain, sinus pain and trouble swallowing.    Respiratory: Positive for cough (dry). Negative for wheezing.    Cardiovascular: Negative for chest pain.   Gastrointestinal: Negative for abdominal pain, blood in stool and nausea.   Genitourinary: Negative for dysuria and hematuria.   Skin: Negative for rash.   Neurological: Positive for headaches (head pressure). Negative for seizures and syncope.     Objective:   /80 (BP Location: Left arm, Patient Position: Sitting, BP Method: Medium (Manual))   Pulse 73   Temp 97.4 °F (36.3 °C) (Tympanic)   Wt 87.5 kg (192 lb 14.4 oz)   SpO2 98%   BMI 25.45 kg/m²     Physical Exam   Constitutional: He is oriented to person, place, and time. He appears well-developed and well-nourished. No distress.   HENT:   Right Ear: Tympanic membrane normal.   Left Ear: Tympanic membrane normal.   Nose: No mucosal edema or rhinorrhea. Right sinus exhibits no maxillary sinus tenderness and no frontal sinus tenderness. Left sinus exhibits no maxillary sinus tenderness and no frontal sinus tenderness.    Mouth/Throat: Posterior oropharyngeal erythema present. No oropharyngeal exudate or posterior oropharyngeal edema.   Eyes: Pupils are equal, round, and reactive to light. Conjunctivae and EOM are normal.   Cardiovascular: Normal rate.   Pulmonary/Chest: Effort normal and breath sounds normal.   Lymphadenopathy:     He has no cervical adenopathy.   Neurological: He is alert and oriented to person, place, and time.   Skin: Skin is warm and dry. No rash noted.   Psychiatric: He has a normal mood and affect. His behavior is normal.       Assessment:     1. Upper respiratory tract infection, unspecified type      Plan:   Upper respiratory tract infection, unspecified type      flonase nasal spray 2 spays each nostril for nasal congestion, post nasal drip, runny nose    Delsym as needed for cough    Allegra or zyrtec for allergies, post nasal drip, runny nose, watery eyes.    cepacol throat lozenges and or chloraseptic spray for sore throat    mucinex for chest congestion.     Tylenol or ibuprofen for pain or fever.           Lab Frequency Next Occurrence   Comprehensive metabolic panel Once 11/13/2019   Lipid panel Once 11/13/2019   Hemoglobin A1c Once 09/30/2019   PSA, Screening Once 09/30/2019       Problem List Items Addressed This Visit     None      Visit Diagnoses     Upper respiratory tract infection, unspecified type    -  Primary          Follow up if symptoms worsen or fail to improve.

## 2019-11-05 LAB
ESTIMATED AVG GLUCOSE: 100 MG/DL (ref 68–131)
HBA1C MFR BLD HPLC: 5.1 % (ref 4–5.6)

## 2019-11-11 ENCOUNTER — LAB VISIT (OUTPATIENT)
Dept: LAB | Facility: HOSPITAL | Age: 59
End: 2019-11-11
Attending: INTERNAL MEDICINE
Payer: COMMERCIAL

## 2019-11-11 DIAGNOSIS — E78.2 MIXED HYPERLIPIDEMIA: ICD-10-CM

## 2019-11-11 LAB
ALBUMIN SERPL BCP-MCNC: 3.7 G/DL (ref 3.5–5.2)
ALP SERPL-CCNC: 74 U/L (ref 55–135)
ALT SERPL W/O P-5'-P-CCNC: 25 U/L (ref 10–44)
ANION GAP SERPL CALC-SCNC: 10 MMOL/L (ref 8–16)
AST SERPL-CCNC: 23 U/L (ref 10–40)
BILIRUB SERPL-MCNC: 0.6 MG/DL (ref 0.1–1)
BUN SERPL-MCNC: 17 MG/DL (ref 6–20)
CALCIUM SERPL-MCNC: 9.9 MG/DL (ref 8.7–10.5)
CHLORIDE SERPL-SCNC: 109 MMOL/L (ref 95–110)
CHOLEST SERPL-MCNC: 97 MG/DL (ref 120–199)
CHOLEST/HDLC SERPL: 3.3 {RATIO} (ref 2–5)
CO2 SERPL-SCNC: 26 MMOL/L (ref 23–29)
CREAT SERPL-MCNC: 0.9 MG/DL (ref 0.5–1.4)
EST. GFR  (AFRICAN AMERICAN): >60 ML/MIN/1.73 M^2
EST. GFR  (NON AFRICAN AMERICAN): >60 ML/MIN/1.73 M^2
GLUCOSE SERPL-MCNC: 76 MG/DL (ref 70–110)
HDLC SERPL-MCNC: 29 MG/DL (ref 40–75)
HDLC SERPL: 29.9 % (ref 20–50)
LDLC SERPL CALC-MCNC: 55.4 MG/DL (ref 63–159)
NONHDLC SERPL-MCNC: 68 MG/DL
POTASSIUM SERPL-SCNC: 4.9 MMOL/L (ref 3.5–5.1)
PROT SERPL-MCNC: 7 G/DL (ref 6–8.4)
SODIUM SERPL-SCNC: 145 MMOL/L (ref 136–145)
TRIGL SERPL-MCNC: 63 MG/DL (ref 30–150)

## 2019-11-11 PROCEDURE — 36415 COLL VENOUS BLD VENIPUNCTURE: CPT | Mod: PO

## 2019-11-11 PROCEDURE — 80053 COMPREHEN METABOLIC PANEL: CPT

## 2019-11-11 PROCEDURE — 80061 LIPID PANEL: CPT

## 2019-11-19 ENCOUNTER — OFFICE VISIT (OUTPATIENT)
Dept: CARDIOLOGY | Facility: CLINIC | Age: 59
End: 2019-11-19
Payer: COMMERCIAL

## 2019-11-19 VITALS
SYSTOLIC BLOOD PRESSURE: 108 MMHG | BODY MASS INDEX: 25.39 KG/M2 | WEIGHT: 192.44 LBS | HEART RATE: 71 BPM | DIASTOLIC BLOOD PRESSURE: 70 MMHG | OXYGEN SATURATION: 97 %

## 2019-11-19 DIAGNOSIS — E78.2 MIXED HYPERLIPIDEMIA: ICD-10-CM

## 2019-11-19 DIAGNOSIS — Z98.61 HISTORY OF PTCA: ICD-10-CM

## 2019-11-19 DIAGNOSIS — R93.1 AGATSTON CORONARY ARTERY CALCIUM SCORE GREATER THAN 400: Primary | ICD-10-CM

## 2019-11-19 DIAGNOSIS — I25.118 CORONARY ARTERY DISEASE OF NATIVE ARTERY OF NATIVE HEART WITH STABLE ANGINA PECTORIS: ICD-10-CM

## 2019-11-19 DIAGNOSIS — I10 ESSENTIAL HYPERTENSION: Chronic | ICD-10-CM

## 2019-11-19 PROCEDURE — 3074F PR MOST RECENT SYSTOLIC BLOOD PRESSURE < 130 MM HG: ICD-10-PCS | Mod: CPTII,S$GLB,, | Performed by: INTERNAL MEDICINE

## 2019-11-19 PROCEDURE — 3078F DIAST BP <80 MM HG: CPT | Mod: CPTII,S$GLB,, | Performed by: INTERNAL MEDICINE

## 2019-11-19 PROCEDURE — 99999 PR PBB SHADOW E&M-EST. PATIENT-LVL III: ICD-10-PCS | Mod: PBBFAC,,, | Performed by: INTERNAL MEDICINE

## 2019-11-19 PROCEDURE — 99213 OFFICE O/P EST LOW 20 MIN: CPT | Mod: S$GLB,,, | Performed by: INTERNAL MEDICINE

## 2019-11-19 PROCEDURE — 3008F PR BODY MASS INDEX (BMI) DOCUMENTED: ICD-10-PCS | Mod: CPTII,S$GLB,, | Performed by: INTERNAL MEDICINE

## 2019-11-19 PROCEDURE — 3008F BODY MASS INDEX DOCD: CPT | Mod: CPTII,S$GLB,, | Performed by: INTERNAL MEDICINE

## 2019-11-19 PROCEDURE — 3078F PR MOST RECENT DIASTOLIC BLOOD PRESSURE < 80 MM HG: ICD-10-PCS | Mod: CPTII,S$GLB,, | Performed by: INTERNAL MEDICINE

## 2019-11-19 PROCEDURE — 99213 PR OFFICE/OUTPT VISIT, EST, LEVL III, 20-29 MIN: ICD-10-PCS | Mod: S$GLB,,, | Performed by: INTERNAL MEDICINE

## 2019-11-19 PROCEDURE — 99999 PR PBB SHADOW E&M-EST. PATIENT-LVL III: CPT | Mod: PBBFAC,,, | Performed by: INTERNAL MEDICINE

## 2019-11-19 PROCEDURE — 3074F SYST BP LT 130 MM HG: CPT | Mod: CPTII,S$GLB,, | Performed by: INTERNAL MEDICINE

## 2019-11-19 NOTE — PROGRESS NOTES
Subjective:    Patient ID:  Hernan Fields is a 59 y.o. male who presents for evaluation of Hyperlipidemia; Hypertension; Coronary Artery Disease; and Risk Factor Management For Atherosclerosis        HPI Pt presents for f/u.  His current medical conditions include CAD, dyslipidemia, HTN. Nonsmoker. + family h/o CAD.   Past hx pertinent for following:  H/o abnormal calcium score 1087 in 2014.   H/o  chronic atypical cp sxs  S/p PCI RCA NADINE x 2 June 2016; calcified nonobstructive LAD/left main disease as well, normal LVEF.  Stress echo 9/18 good exercise capacity, no wall motion abnormality, normal LV function.  Now here.  He seems ok.  Chronic atypical cp sxs are minimal and stable and have not changed per pt.  He has no typical anginal sxs.  No CHF sxs.  Lipids well controlled.  Weight stable.  Active.  Compliant with meds.      Current Outpatient Medications:     albuterol (PROVENTIL/VENTOLIN HFA) 90 mcg/actuation inhaler, Inhale 2 puffs into the lungs every 6 (six) hours as needed for Wheezing or Shortness of Breath., Disp: 1 each, Rfl: 5    aspirin (ASPIRIN LOW DOSE) 81 MG EC tablet, Take by mouth. 1 Tablet, Delayed Release (E.C.) Oral Every day, Disp: , Rfl:     atorvastatin (LIPITOR) 80 MG tablet, Take 1 tablet (80 mg total) by mouth every evening., Disp: 90 tablet, Rfl: 3    clonazePAM (KLONOPIN) 0.5 MG tablet, Take 1 tablet (0.5 mg total) by mouth 2 (two) times daily as needed for Anxiety., Disp: 30 tablet, Rfl: 0    cyclobenzaprine (FLEXERIL) 10 MG tablet, 1/2 to one tab po qhs prn muscle spasm, Disp: 30 tablet, Rfl: 0    metoprolol succinate (TOPROL-XL) 50 MG 24 hr tablet, TAKE ONE TABLET BY MOUTH ONCE DAILY, Disp: 90 tablet, Rfl: 3    RANEXA 500 mg Tb12, TAKE 1 TABLET BY MOUTH 2 TIMES DAILY., Disp: 180 tablet, Rfl: 2    sildenafil (VIAGRA) 100 MG tablet, TAKE 1 TABLET BY MOUTH 20 MINUTES BEFORE SEXUAL ACTIVITY, Disp: 10 tablet, Rfl: 6    valacyclovir (VALTREX) 500 MG tablet, Take 1 tablet by mouth  once daily., Disp: , Rfl: 6      Review of Systems   Constitution: Negative.   HENT: Negative.    Eyes: Negative.    Cardiovascular: Positive for chest pain.   Respiratory: Negative.    Endocrine: Negative.    Hematologic/Lymphatic: Negative.    Skin: Negative.    Musculoskeletal: Negative.    Gastrointestinal: Negative.    Genitourinary: Negative.    Neurological: Negative.    Psychiatric/Behavioral: Negative.    Allergic/Immunologic: Negative.        /70 (BP Location: Right arm, Patient Position: Sitting)   Pulse 71   Wt 87.3 kg (192 lb 7.4 oz)   SpO2 97%   BMI 25.39 kg/m²     Wt Readings from Last 3 Encounters:   11/19/19 87.3 kg (192 lb 7.4 oz)   11/04/19 87.5 kg (192 lb 14.4 oz)   09/30/19 87.1 kg (192 lb 0.3 oz)     Temp Readings from Last 3 Encounters:   11/04/19 97.4 °F (36.3 °C) (Tympanic)   09/30/19 98.4 °F (36.9 °C) (Tympanic)   09/19/18 97.8 °F (36.6 °C) (Oral)     BP Readings from Last 3 Encounters:   11/19/19 108/70   11/04/19 128/80   09/30/19 116/80     Pulse Readings from Last 3 Encounters:   11/19/19 71   11/04/19 73   09/30/19 76          Objective:    Physical Exam   Constitutional: He is oriented to person, place, and time. He appears well-developed and well-nourished.   HENT:   Head: Normocephalic.   Neck: Normal range of motion. Neck supple. Normal carotid pulses, no hepatojugular reflux and no JVD present. Carotid bruit is not present. No thyromegaly present.   Cardiovascular: Normal rate, regular rhythm, S1 normal and S2 normal. PMI is not displaced. Exam reveals no S3, no S4, no distant heart sounds, no friction rub, no midsystolic click and no opening snap.   No murmur heard.  Pulses:       Radial pulses are 2+ on the right side, and 2+ on the left side.   Pulmonary/Chest: Effort normal and breath sounds normal. He has no wheezes. He has no rales.   Abdominal: Soft. Bowel sounds are normal. He exhibits no distension, no abdominal bruit, no ascites and no mass. There is no  tenderness.   Musculoskeletal: He exhibits no edema.   Neurological: He is alert and oriented to person, place, and time.   Skin: Skin is warm.   Psychiatric: He has a normal mood and affect. His behavior is normal.   Nursing note and vitals reviewed.      I have reviewed all pertinent labs and cardiac studies.      Chemistry        Component Value Date/Time     11/11/2019 0757    K 4.9 11/11/2019 0757     11/11/2019 0757    CO2 26 11/11/2019 0757    BUN 17 11/11/2019 0757    CREATININE 0.9 11/11/2019 0757    GLU 76 11/11/2019 0757        Component Value Date/Time    CALCIUM 9.9 11/11/2019 0757    ALKPHOS 74 11/11/2019 0757    AST 23 11/11/2019 0757    ALT 25 11/11/2019 0757    BILITOT 0.6 11/11/2019 0757    ESTGFRAFRICA >60.0 11/11/2019 0757    EGFRNONAA >60.0 11/11/2019 0757        Lab Results   Component Value Date    HGBA1C 5.1 11/04/2019     Lab Results   Component Value Date    CHOL 97 (L) 11/11/2019    CHOL 123 05/07/2019    CHOL 101 (L) 04/27/2018     Lab Results   Component Value Date    HDL 29 (L) 11/11/2019    HDL 37 (L) 05/07/2019    HDL 32 (L) 04/27/2018     Lab Results   Component Value Date    LDLCALC 55.4 (L) 11/11/2019    LDLCALC 59.2 (L) 05/07/2019    LDLCALC 54.8 (L) 04/27/2018     Lab Results   Component Value Date    TRIG 63 11/11/2019    TRIG 134 05/07/2019    TRIG 71 04/27/2018     Lab Results   Component Value Date    CHOLHDL 29.9 11/11/2019    CHOLHDL 30.1 05/07/2019    CHOLHDL 31.7 04/27/2018         Lab Results   Component Value Date    WBC 6.03 09/19/2018    HGB 14.3 09/19/2018    HCT 42.2 09/19/2018    MCV 97 09/19/2018     09/19/2018             Assessment:       1. Agatston coronary artery calcium score greater than 400    2. Coronary artery disease of native artery of native heart with stable angina pectoris    3. History of PTCA    4. Essential hypertension    5. Mixed hyperlipidemia         Plan:             Continue medical tx for CAD.  Cardiac diet.  Daily  exercise.  No changes in meds.  F/u 6 months with labs.

## 2020-01-26 DIAGNOSIS — I25.83 CORONARY ARTERY DISEASE DUE TO LIPID RICH PLAQUE: ICD-10-CM

## 2020-01-26 DIAGNOSIS — I25.10 CORONARY ARTERY DISEASE DUE TO LIPID RICH PLAQUE: ICD-10-CM

## 2020-01-26 RX ORDER — RANOLAZINE 500 MG/1
TABLET, EXTENDED RELEASE ORAL
Qty: 180 TABLET | Refills: 2 | Status: SHIPPED | OUTPATIENT
Start: 2020-01-26 | End: 2020-12-16

## 2020-04-06 ENCOUNTER — PATIENT MESSAGE (OUTPATIENT)
Dept: ADMINISTRATIVE | Facility: OTHER | Age: 60
End: 2020-04-06

## 2020-05-01 ENCOUNTER — PATIENT OUTREACH (OUTPATIENT)
Dept: OTHER | Facility: OTHER | Age: 60
End: 2020-05-01

## 2020-05-14 RX ORDER — METOPROLOL SUCCINATE 50 MG/1
TABLET, EXTENDED RELEASE ORAL
Qty: 90 TABLET | Refills: 3 | Status: SHIPPED | OUTPATIENT
Start: 2020-05-14 | End: 2020-08-19 | Stop reason: SDUPTHER

## 2020-06-05 ENCOUNTER — PATIENT OUTREACH (OUTPATIENT)
Dept: OTHER | Facility: OTHER | Age: 60
End: 2020-06-05

## 2020-06-05 DIAGNOSIS — I10 ESSENTIAL HYPERTENSION: Primary | ICD-10-CM

## 2020-06-05 DIAGNOSIS — I10 ESSENTIAL HYPERTENSION: Primary | Chronic | ICD-10-CM

## 2020-06-05 NOTE — PROGRESS NOTES
Digital Medicine: Health  Introduction    Introduced Hernan Fields to Digital Medicine. Discussed health  role and recommended lifestyle modifications.    HPI    Last 5 Patient Entered Readings                                      Current 30 Day Average: 130/83     Recent Readings 5/29/2020 5/24/2020 5/24/2020 5/22/2020 5/19/2020    SBP (mmHg) 112 131 127 132 124    DBP (mmHg) 77 82 86 83 85    Pulse 69 86 63 60 71            Intervention/Plan    There are no preventive care reminders to display for this patient.    Reviewed the importance of self-monitoring, medication adherence, and that the health  can be used as a resource for lifestyle modifications to help reduce or maintain a healthy lifestyle.    Sent link to [facility] webpages and my contact information via [employer] for future questions. Follow up scheduled.         Screenings    SDOH

## 2020-06-05 NOTE — PROGRESS NOTES
Digital Medicine: Health  Introduction    Introduced Hernan Fields to Digital Medicine. Discussed health  role and recommended lifestyle modifications.    HPI    Last 5 Patient Entered Readings                                      Current 30 Day Average: 130/83     Recent Readings 5/29/2020 5/24/2020 5/24/2020 5/22/2020 5/19/2020    SBP (mmHg) 112 131 127 132 124    DBP (mmHg) 77 82 86 83 85    Pulse 69 86 63 60 71            Intervention/Plan    There are no preventive care reminders to display for this patient.    Reviewed the importance of self-monitoring, medication adherence, and that the health  can be used as a resource for lifestyle modifications to help reduce or maintain a healthy lifestyle.    Sent link to [facility] webpages and my contact information via [employer] for future questions. Follow up scheduled.         Screenings    SDOH

## 2020-06-05 NOTE — PROGRESS NOTES
Digital Medicine: Health  Introduction    Introduced Hernan Fields to Digital Medicine. Discussed health  role and recommended lifestyle modifications.    Newly enrolled patient to HTN Digital Medicine program.    Mr. Hernan Fields, enrolled to the program with the goal of having his BP monitored. He doesn't fully trust the digital bp monitors since his blood pressure readings are usually lower when checked manually at the physicians office. He's been checking his bp with a wrist monitor that registers blood pressure readings below 120/80's vs the ihealth monitor registers slightly higher readings. He mentioned that the highest reading was after he came home from exercising: running. He agreed that it might have not been the best time to check his bp. Reviewed bp monitoring technique and program frequency expectations.     He was diagnosed with HTN over 20 years ago and he's managed his BP with medication, conscience of making healthy food choices and regular exercise.        The history is provided by the patient.     HYPERTENSION  Our goal is to get BP to consistently below 130/80mmHg and make the process convenient so patient can avoid extra trips to the office. Getting your blood pressure below 130/80mmHg (definition of control) will reduce your risk for heart attack, kidney failure, stroke and death (as well as kidney failure, eye disease, & dementia)      Reviewed that the Digital Medicine care team - consisting of a clinician and a health  - will follow the most current evidence-based national guidelines for treating your condition.  The health  will focus on lifestyle modifications and motivation while the clinician will focus on medication therapy.  The care team will review all data on a regular basis and reach out as needed.      Explained that one of the key parts of the program is communication with the care team.  Asked patient to respond to outreach attempts and complete  questionnaires.  Stressed importance of medication adherence.    Explained that we expect patient to obtain several blood pressures per week at random times of day.  Instructed patient not to allow anyone else to use phone and monitoring device.  Confirmed appropriate BP monitoring technique.      Explained to patient that the digital medicine team is not available for emergencies.  Patient will call [facility] on-call ([phone] or [phone]) or [phone] if needed.      Patient's BP goal is 130/80.Patient's BP average is 130/83 mmHg, which is above goal, per 2017 ACC/AHA Hypertension Guidelines.          Last 5 Patient Entered Readings                                      Current 30 Day Average: 130/83     Recent Readings 5/29/2020 5/24/2020 5/24/2020 5/22/2020 5/19/2020    SBP (mmHg) 112 131 127 132 124    DBP (mmHg) 77 82 86 83 85    Pulse 69 86 63 60 71            INTERVENTION(S)  recommended diet modifications, recommend physical activity, reviewed monitoring technique and encouragement/support    PLAN  continue monitoring    Continue submitting weekly bp readings  Charge the BP monitor every 30 days      There are no preventive care reminders to display for this patient.    Reviewed the importance of self-monitoring, medication adherence, and that the health  can be used as a resource for lifestyle modifications to help reduce or maintain a healthy lifestyle.    Sent link to [facility] [employer] [facility] webpages and my contact information via [employer] for future questions. Follow up scheduled.             Diet Screening       The patient drinks 128 ounces of water per day.    He has the following dietary restrictions: low sodium diet    Reviewed AHA/AACE recommendations:  Limit sodium intake to <2000mg/day    Pt admits to splurging on salty foods mainly when he eats out twice a day. This is an area where he would like support to work on do reduce his sodium intake.    Pt verbally consented low sodium while  eating out educational recourses to be emailed to:   [email or web address]    Intervention(s): low sodium diet education    Physical Activity Screening   When asked if exercising, patient responded: yes3 day(s) a week.      Patient participates in the following activities: weights, walking and running    He identified the following barriers to physical activity: no barriers to being active    Medication Adherence Screening   He did not miss a dose this month.    Patient identified the following reasons for non-compliance: None      SDOH

## 2020-06-05 NOTE — PROGRESS NOTES
Digital Medicine: Health  Introduction    Introduced Hernan Fields to Digital Medicine. Discussed health  role and recommended lifestyle modifications.    HPI    Last 5 Patient Entered Readings                                      Current 30 Day Average: 130/83     Recent Readings 5/29/2020 5/24/2020 5/24/2020 5/22/2020 5/19/2020    SBP (mmHg) 112 131 127 132 124    DBP (mmHg) 77 82 86 83 85    Pulse 69 86 63 60 71            Intervention/Plan    There are no preventive care reminders to display for this patient.    Reviewed the importance of self-monitoring, medication adherence, and that the health  can be used as a resource for lifestyle modifications to help reduce or maintain a healthy lifestyle.    Sent link to [facility] [employer] [facility] webpages and my contact information via [employer] for future questions. Follow up scheduled.         Screenings    SDOH

## 2020-06-23 ENCOUNTER — LAB VISIT (OUTPATIENT)
Dept: LAB | Facility: HOSPITAL | Age: 60
End: 2020-06-23
Attending: INTERNAL MEDICINE
Payer: COMMERCIAL

## 2020-06-23 DIAGNOSIS — I25.118 CORONARY ARTERY DISEASE OF NATIVE ARTERY OF NATIVE HEART WITH STABLE ANGINA PECTORIS: ICD-10-CM

## 2020-06-23 DIAGNOSIS — R93.1 AGATSTON CORONARY ARTERY CALCIUM SCORE GREATER THAN 400: ICD-10-CM

## 2020-06-23 DIAGNOSIS — E78.2 MIXED HYPERLIPIDEMIA: ICD-10-CM

## 2020-06-23 DIAGNOSIS — Z98.61 HISTORY OF PTCA: ICD-10-CM

## 2020-06-23 DIAGNOSIS — I10 ESSENTIAL HYPERTENSION: Chronic | ICD-10-CM

## 2020-06-23 PROCEDURE — 80061 LIPID PANEL: CPT

## 2020-06-23 PROCEDURE — 86141 C-REACTIVE PROTEIN HS: CPT

## 2020-06-23 PROCEDURE — 36415 COLL VENOUS BLD VENIPUNCTURE: CPT

## 2020-06-23 PROCEDURE — 80053 COMPREHEN METABOLIC PANEL: CPT

## 2020-06-24 LAB
ALBUMIN SERPL BCP-MCNC: 4.2 G/DL (ref 3.5–5.2)
ALP SERPL-CCNC: 78 U/L (ref 55–135)
ALT SERPL W/O P-5'-P-CCNC: 32 U/L (ref 10–44)
ANION GAP SERPL CALC-SCNC: 7 MMOL/L (ref 8–16)
AST SERPL-CCNC: 25 U/L (ref 10–40)
BILIRUB SERPL-MCNC: 0.6 MG/DL (ref 0.1–1)
BUN SERPL-MCNC: 16 MG/DL (ref 6–20)
CALCIUM SERPL-MCNC: 9.7 MG/DL (ref 8.7–10.5)
CHLORIDE SERPL-SCNC: 107 MMOL/L (ref 95–110)
CHOLEST SERPL-MCNC: 101 MG/DL (ref 120–199)
CHOLEST/HDLC SERPL: 3.2 {RATIO} (ref 2–5)
CO2 SERPL-SCNC: 28 MMOL/L (ref 23–29)
CREAT SERPL-MCNC: 1 MG/DL (ref 0.5–1.4)
CRP SERPL-MCNC: 0.59 MG/L (ref 0–3.19)
EST. GFR  (AFRICAN AMERICAN): >60 ML/MIN/1.73 M^2
EST. GFR  (NON AFRICAN AMERICAN): >60 ML/MIN/1.73 M^2
GLUCOSE SERPL-MCNC: 100 MG/DL (ref 70–110)
HDLC SERPL-MCNC: 32 MG/DL (ref 40–75)
HDLC SERPL: 31.7 % (ref 20–50)
LDLC SERPL CALC-MCNC: 49.6 MG/DL (ref 63–159)
NONHDLC SERPL-MCNC: 69 MG/DL
POTASSIUM SERPL-SCNC: 4.8 MMOL/L (ref 3.5–5.1)
PROT SERPL-MCNC: 7 G/DL (ref 6–8.4)
SODIUM SERPL-SCNC: 142 MMOL/L (ref 136–145)
TRIGL SERPL-MCNC: 97 MG/DL (ref 30–150)

## 2020-07-04 NOTE — PROGRESS NOTES
Subjective:    Patient ID:  Hernan Fields is a 60 y.o. male who presents for evaluation of Risk Factor Management For Atherosclerosis, Hyperlipidemia, and Coronary Artery Disease        HPI Pt presents for f/u.  His current medical conditions include CAD, dyslipidemia, HTN. Nonsmoker. + family h/o CAD.   Past hx pertinent for following:  H/o abnormal calcium score 1087 in 2014.   H/o  chronic atypical cp sxs  S/p PCI RCA NADINE x 2 June 2016; calcified nonobstructive LAD/left main disease as well, normal LVEF.  Stress echo 9/18 good exercise capacity, no wall motion abnormality, normal LV function.  Now here.  HTN controlled on current meds.  Lipids 6/20 shows LDL 49, on statin tx.  CRP 0.59  Compliant with meds.  Enrolled in digital HTN program.  He is exercising at home.  ecg today reviewed and is normal.  Has stable atypical cp, and has not changed in severity or frequency.    June:    Last 5 Patient Entered Readings                                      Current 30 Day Average: 130/83      Recent Readings 5/29/2020 5/24/2020 5/24/2020 5/22/2020 5/19/2020     SBP (mmHg) 112 131 127 132 124     DBP (mmHg) 77 82 86 83 85     Pulse 69               Current Outpatient Medications:     albuterol (PROVENTIL/VENTOLIN HFA) 90 mcg/actuation inhaler, Inhale 2 puffs into the lungs every 6 (six) hours as needed for Wheezing or Shortness of Breath., Disp: 1 each, Rfl: 5    aspirin (ASPIRIN LOW DOSE) 81 MG EC tablet, Take by mouth. 1 Tablet, Delayed Release (E.C.) Oral Every day, Disp: , Rfl:     atorvastatin (LIPITOR) 80 MG tablet, Take 1 tablet (80 mg total) by mouth every evening., Disp: 90 tablet, Rfl: 3    clonazePAM (KLONOPIN) 0.5 MG tablet, Take 1 tablet (0.5 mg total) by mouth 2 (two) times daily as needed for Anxiety., Disp: 30 tablet, Rfl: 0    cyclobenzaprine (FLEXERIL) 10 MG tablet, 1/2 to one tab po qhs prn muscle spasm, Disp: 30 tablet, Rfl: 0    metoprolol succinate (TOPROL-XL) 50 MG 24 hr tablet, TAKE ONE  "TABLET BY MOUTH ONCE DAILY, Disp: 90 tablet, Rfl: 3    ranolazine (RANEXA) 500 MG Tb12, TAKE 1 TABLET BY MOUTH 2 TIMES DAILY., Disp: 180 tablet, Rfl: 2    sildenafil (VIAGRA) 100 MG tablet, TAKE 1 TABLET BY MOUTH 20 MINUTES BEFORE SEXUAL ACTIVITY, Disp: 10 tablet, Rfl: 6    valacyclovir (VALTREX) 500 MG tablet, Take 1 tablet by mouth once daily., Disp: , Rfl: 6      Review of Systems   Constitution: Negative.   HENT: Negative.    Eyes: Negative.    Cardiovascular: Positive for chest pain.   Respiratory: Negative.    Endocrine: Negative.    Hematologic/Lymphatic: Negative.    Skin: Negative.    Musculoskeletal: Negative.    Gastrointestinal: Negative.    Genitourinary: Negative.    Neurological: Negative.    Psychiatric/Behavioral: Negative.    Allergic/Immunologic: Negative.        /78 (BP Location: Left arm, Patient Position: Sitting, BP Method: Large (Manual))   Pulse 63   Resp 16   Ht 6' 1" (1.854 m)   Wt 84.8 kg (187 lb)   SpO2 98%   BMI 24.67 kg/m²     Wt Readings from Last 3 Encounters:   07/06/20 84.8 kg (187 lb)   11/19/19 87.3 kg (192 lb 7.4 oz)   11/04/19 87.5 kg (192 lb 14.4 oz)     Temp Readings from Last 3 Encounters:   11/04/19 97.4 °F (36.3 °C) (Tympanic)   09/30/19 98.4 °F (36.9 °C) (Tympanic)   09/19/18 97.8 °F (36.6 °C) (Oral)     BP Readings from Last 3 Encounters:   07/06/20 116/78   11/19/19 108/70   11/04/19 128/80     Pulse Readings from Last 3 Encounters:   07/06/20 63   11/19/19 71   11/04/19 73          Objective:    Physical Exam   Constitutional: He is oriented to person, place, and time. He appears well-developed and well-nourished.   HENT:   Head: Normocephalic.   Neck: Normal range of motion. Neck supple. Normal carotid pulses, no hepatojugular reflux and no JVD present. Carotid bruit is not present. No thyromegaly present.   Cardiovascular: Normal rate, regular rhythm, S1 normal and S2 normal. PMI is not displaced. Exam reveals no S3, no S4, no distant heart sounds, no " friction rub, no midsystolic click and no opening snap.   No murmur heard.  Pulses:       Radial pulses are 2+ on the right side and 2+ on the left side.   Pulmonary/Chest: Effort normal and breath sounds normal. He has no wheezes. He has no rales.   Abdominal: Soft. Bowel sounds are normal. He exhibits no distension, no abdominal bruit, no ascites and no mass. There is no abdominal tenderness.   Musculoskeletal:         General: No edema.   Neurological: He is alert and oriented to person, place, and time.   Skin: Skin is warm.   Psychiatric: He has a normal mood and affect. His behavior is normal.   Nursing note and vitals reviewed.      I have reviewed all pertinent labs and cardiac studies.    Component      Latest Ref Rng & Units 6/23/2020   CRP, High Sensitivity      0.00 - 3.19 mg/L 0.59           Chemistry        Component Value Date/Time     06/23/2020 0752    K 4.8 06/23/2020 0752     06/23/2020 0752    CO2 28 06/23/2020 0752    BUN 16 06/23/2020 0752    CREATININE 1.0 06/23/2020 0752     06/23/2020 0752        Component Value Date/Time    CALCIUM 9.7 06/23/2020 0752    ALKPHOS 78 06/23/2020 0752    AST 25 06/23/2020 0752    ALT 32 06/23/2020 0752    BILITOT 0.6 06/23/2020 0752    ESTGFRAFRICA >60.0 06/23/2020 0752    EGFRNONAA >60.0 06/23/2020 0752        Lab Results   Component Value Date    WBC 6.03 09/19/2018    HGB 14.3 09/19/2018    HCT 42.2 09/19/2018    MCV 97 09/19/2018     09/19/2018       Lab Results   Component Value Date    HGBA1C 5.1 11/04/2019     Lab Results   Component Value Date    CHOL 101 (L) 06/23/2020    CHOL 97 (L) 11/11/2019    CHOL 123 05/07/2019     Lab Results   Component Value Date    HDL 32 (L) 06/23/2020    HDL 29 (L) 11/11/2019    HDL 37 (L) 05/07/2019     Lab Results   Component Value Date    LDLCALC 49.6 (L) 06/23/2020    LDLCALC 55.4 (L) 11/11/2019    LDLCALC 59.2 (L) 05/07/2019     Lab Results   Component Value Date    TRIG 97 06/23/2020    TRIG  63 11/11/2019    TRIG 134 05/07/2019     Lab Results   Component Value Date    CHOLHDL 31.7 06/23/2020    CHOLHDL 29.9 11/11/2019    CHOLHDL 30.1 05/07/2019     No results found for: CRP        Assessment:       1. Agatston coronary artery calcium score greater than 400    2. Coronary artery disease of native artery of native heart with stable angina pectoris    3. Mixed hyperlipidemia    4. Essential hypertension    5. History of PTCA    6. Atypical chest pain         Plan:               Stable cardiovascular conditions at present time on current medical treatment.  Goal BP < 130/80.  Reviewed all tests and above medical conditions with patient in detail and formulated treatment plan.  Continue optimal medical treatment for cardiovascular conditions.  Cardiac low salt diet discussed.  Daily exercise encouraged, goal 30 +  minutes aerobic exercise as tolerated.  Maintaining healthy weight and weight loss goals (if needed) were discussed in clinic.  No changes in meds today.   F/u in 6 months with CMP, FLP, CRP.

## 2020-07-06 ENCOUNTER — HOSPITAL ENCOUNTER (OUTPATIENT)
Dept: CARDIOLOGY | Facility: HOSPITAL | Age: 60
Discharge: HOME | End: 2020-07-06
Attending: INTERNAL MEDICINE
Payer: COMMERCIAL

## 2020-07-06 ENCOUNTER — OFFICE VISIT (OUTPATIENT)
Dept: CARDIOLOGY | Facility: CLINIC | Age: 60
End: 2020-07-06
Payer: COMMERCIAL

## 2020-07-06 VITALS
SYSTOLIC BLOOD PRESSURE: 116 MMHG | WEIGHT: 187 LBS | HEIGHT: 73 IN | HEART RATE: 63 BPM | RESPIRATION RATE: 16 BRPM | BODY MASS INDEX: 24.78 KG/M2 | DIASTOLIC BLOOD PRESSURE: 78 MMHG | OXYGEN SATURATION: 98 %

## 2020-07-06 DIAGNOSIS — R07.89 ATYPICAL CHEST PAIN: ICD-10-CM

## 2020-07-06 DIAGNOSIS — Z98.61 HISTORY OF PTCA: ICD-10-CM

## 2020-07-06 DIAGNOSIS — I25.118 CORONARY ARTERY DISEASE OF NATIVE ARTERY OF NATIVE HEART WITH STABLE ANGINA PECTORIS: ICD-10-CM

## 2020-07-06 DIAGNOSIS — I10 ESSENTIAL HYPERTENSION: Chronic | ICD-10-CM

## 2020-07-06 DIAGNOSIS — R93.1 AGATSTON CORONARY ARTERY CALCIUM SCORE GREATER THAN 400: Primary | ICD-10-CM

## 2020-07-06 DIAGNOSIS — I10 ESSENTIAL HYPERTENSION: ICD-10-CM

## 2020-07-06 DIAGNOSIS — E78.2 MIXED HYPERLIPIDEMIA: ICD-10-CM

## 2020-07-06 PROCEDURE — 99214 PR OFFICE/OUTPT VISIT, EST, LEVL IV, 30-39 MIN: ICD-10-PCS | Mod: 25,S$GLB,, | Performed by: INTERNAL MEDICINE

## 2020-07-06 PROCEDURE — 3008F PR BODY MASS INDEX (BMI) DOCUMENTED: ICD-10-PCS | Mod: CPTII,S$GLB,, | Performed by: INTERNAL MEDICINE

## 2020-07-06 PROCEDURE — 3008F BODY MASS INDEX DOCD: CPT | Mod: CPTII,S$GLB,, | Performed by: INTERNAL MEDICINE

## 2020-07-06 PROCEDURE — 93005 ELECTROCARDIOGRAM TRACING: CPT

## 2020-07-06 PROCEDURE — 93010 ELECTROCARDIOGRAM REPORT: CPT | Mod: ,,, | Performed by: INTERNAL MEDICINE

## 2020-07-06 PROCEDURE — 3074F PR MOST RECENT SYSTOLIC BLOOD PRESSURE < 130 MM HG: ICD-10-PCS | Mod: CPTII,S$GLB,, | Performed by: INTERNAL MEDICINE

## 2020-07-06 PROCEDURE — 93010 EKG 12-LEAD: ICD-10-PCS | Mod: ,,, | Performed by: INTERNAL MEDICINE

## 2020-07-06 PROCEDURE — 3074F SYST BP LT 130 MM HG: CPT | Mod: CPTII,S$GLB,, | Performed by: INTERNAL MEDICINE

## 2020-07-06 PROCEDURE — 99999 PR PBB SHADOW E&M-EST. PATIENT-LVL III: CPT | Mod: PBBFAC,,, | Performed by: INTERNAL MEDICINE

## 2020-07-06 PROCEDURE — 3078F PR MOST RECENT DIASTOLIC BLOOD PRESSURE < 80 MM HG: ICD-10-PCS | Mod: CPTII,S$GLB,, | Performed by: INTERNAL MEDICINE

## 2020-07-06 PROCEDURE — 99999 PR PBB SHADOW E&M-EST. PATIENT-LVL III: ICD-10-PCS | Mod: PBBFAC,,, | Performed by: INTERNAL MEDICINE

## 2020-07-06 PROCEDURE — 3078F DIAST BP <80 MM HG: CPT | Mod: CPTII,S$GLB,, | Performed by: INTERNAL MEDICINE

## 2020-07-06 PROCEDURE — 99214 OFFICE O/P EST MOD 30 MIN: CPT | Mod: 25,S$GLB,, | Performed by: INTERNAL MEDICINE

## 2020-07-10 ENCOUNTER — PATIENT OUTREACH (OUTPATIENT)
Dept: OTHER | Facility: OTHER | Age: 60
End: 2020-07-10

## 2020-07-27 NOTE — PROGRESS NOTES
Ongoing difficulty reach pt.  Recent Cardiology visit.  Cardiologist happy with current BP control.    BP avg 127/83 mmHg

## 2020-08-10 DIAGNOSIS — Z00.00 ROUTINE GENERAL MEDICAL EXAMINATION AT A HEALTH CARE FACILITY: Primary | ICD-10-CM

## 2020-08-19 ENCOUNTER — CLINICAL SUPPORT (OUTPATIENT)
Dept: INTERNAL MEDICINE | Facility: CLINIC | Age: 60
End: 2020-08-19
Payer: COMMERCIAL

## 2020-08-19 ENCOUNTER — OFFICE VISIT (OUTPATIENT)
Dept: INTERNAL MEDICINE | Facility: CLINIC | Age: 60
End: 2020-08-19
Payer: COMMERCIAL

## 2020-08-19 VITALS
HEIGHT: 73 IN | WEIGHT: 180.75 LBS | SYSTOLIC BLOOD PRESSURE: 116 MMHG | TEMPERATURE: 97 F | DIASTOLIC BLOOD PRESSURE: 75 MMHG | BODY MASS INDEX: 23.96 KG/M2 | HEART RATE: 57 BPM

## 2020-08-19 DIAGNOSIS — I10 ESSENTIAL HYPERTENSION: Chronic | ICD-10-CM

## 2020-08-19 DIAGNOSIS — Z00.00 ROUTINE GENERAL MEDICAL EXAMINATION AT A HEALTH CARE FACILITY: Primary | ICD-10-CM

## 2020-08-19 DIAGNOSIS — E78.2 MIXED HYPERLIPIDEMIA: ICD-10-CM

## 2020-08-19 DIAGNOSIS — R79.89 LOW TESTOSTERONE: ICD-10-CM

## 2020-08-19 DIAGNOSIS — J98.01 BRONCHOSPASM: ICD-10-CM

## 2020-08-19 DIAGNOSIS — I25.10 CORONARY ARTERY DISEASE DUE TO LIPID RICH PLAQUE: ICD-10-CM

## 2020-08-19 DIAGNOSIS — R93.1 AGATSTON CORONARY ARTERY CALCIUM SCORE GREATER THAN 400: ICD-10-CM

## 2020-08-19 DIAGNOSIS — I25.83 CORONARY ARTERY DISEASE DUE TO LIPID RICH PLAQUE: ICD-10-CM

## 2020-08-19 DIAGNOSIS — F41.9 ANXIETY: ICD-10-CM

## 2020-08-19 LAB
25(OH)D3+25(OH)D2 SERPL-MCNC: 38 NG/ML (ref 30–96)
COMPLEXED PSA SERPL-MCNC: 1 NG/ML (ref 0–4)
ERYTHROCYTE [DISTWIDTH] IN BLOOD BY AUTOMATED COUNT: 11.5 % (ref 11.5–14.5)
ESTIMATED AVG GLUCOSE: 97 MG/DL (ref 68–131)
HBA1C MFR BLD HPLC: 5 % (ref 4–5.6)
HCT VFR BLD AUTO: 43 % (ref 40–54)
HGB BLD-MCNC: 14.6 G/DL (ref 14–18)
MCH RBC QN AUTO: 32.7 PG (ref 27–31)
MCHC RBC AUTO-ENTMCNC: 34 G/DL (ref 32–36)
MCV RBC AUTO: 96 FL (ref 82–98)
PLATELET # BLD AUTO: 216 K/UL (ref 150–350)
PMV BLD AUTO: 9.7 FL (ref 9.2–12.9)
RBC # BLD AUTO: 4.46 M/UL (ref 4.6–6.2)
TESTOST SERPL-MCNC: 232 NG/DL (ref 304–1227)
TSH SERPL DL<=0.005 MIU/L-ACNC: 1.46 UIU/ML (ref 0.4–4)
WBC # BLD AUTO: 4.55 K/UL (ref 3.9–12.7)

## 2020-08-19 PROCEDURE — 3008F BODY MASS INDEX DOCD: CPT | Mod: CPTII,S$GLB,, | Performed by: INTERNAL MEDICINE

## 2020-08-19 PROCEDURE — 3078F PR MOST RECENT DIASTOLIC BLOOD PRESSURE < 80 MM HG: ICD-10-PCS | Mod: CPTII,S$GLB,, | Performed by: INTERNAL MEDICINE

## 2020-08-19 PROCEDURE — 99386 PREV VISIT NEW AGE 40-64: CPT | Mod: S$GLB,,, | Performed by: INTERNAL MEDICINE

## 2020-08-19 PROCEDURE — 83036 HEMOGLOBIN GLYCOSYLATED A1C: CPT

## 2020-08-19 PROCEDURE — 84153 ASSAY OF PSA TOTAL: CPT

## 2020-08-19 PROCEDURE — 3074F PR MOST RECENT SYSTOLIC BLOOD PRESSURE < 130 MM HG: ICD-10-PCS | Mod: CPTII,S$GLB,, | Performed by: INTERNAL MEDICINE

## 2020-08-19 PROCEDURE — 82306 VITAMIN D 25 HYDROXY: CPT

## 2020-08-19 PROCEDURE — 99386 PR PREVENTIVE VISIT,NEW,40-64: ICD-10-PCS | Mod: S$GLB,,, | Performed by: INTERNAL MEDICINE

## 2020-08-19 PROCEDURE — 3074F SYST BP LT 130 MM HG: CPT | Mod: CPTII,S$GLB,, | Performed by: INTERNAL MEDICINE

## 2020-08-19 PROCEDURE — 84403 ASSAY OF TOTAL TESTOSTERONE: CPT

## 2020-08-19 PROCEDURE — 3008F PR BODY MASS INDEX (BMI) DOCUMENTED: ICD-10-PCS | Mod: CPTII,S$GLB,, | Performed by: INTERNAL MEDICINE

## 2020-08-19 PROCEDURE — 85027 COMPLETE CBC AUTOMATED: CPT

## 2020-08-19 PROCEDURE — 84443 ASSAY THYROID STIM HORMONE: CPT

## 2020-08-19 PROCEDURE — 99999 PR PBB SHADOW E&M-EST. PATIENT-LVL IV: CPT | Mod: PBBFAC,,, | Performed by: INTERNAL MEDICINE

## 2020-08-19 PROCEDURE — 3078F DIAST BP <80 MM HG: CPT | Mod: CPTII,S$GLB,, | Performed by: INTERNAL MEDICINE

## 2020-08-19 PROCEDURE — 99999 PR PBB SHADOW E&M-EST. PATIENT-LVL IV: ICD-10-PCS | Mod: PBBFAC,,, | Performed by: INTERNAL MEDICINE

## 2020-08-19 RX ORDER — ATORVASTATIN CALCIUM 80 MG/1
80 TABLET, FILM COATED ORAL NIGHTLY
Qty: 90 TABLET | Refills: 3 | Status: SHIPPED | OUTPATIENT
Start: 2020-08-19 | End: 2021-08-15

## 2020-08-19 RX ORDER — ALBUTEROL SULFATE 90 UG/1
2 AEROSOL, METERED RESPIRATORY (INHALATION) EVERY 6 HOURS PRN
Qty: 1 G | Refills: 3 | Status: SHIPPED | OUTPATIENT
Start: 2020-08-19 | End: 2021-09-15 | Stop reason: SDUPTHER

## 2020-08-19 RX ORDER — METOPROLOL SUCCINATE 50 MG/1
50 TABLET, EXTENDED RELEASE ORAL DAILY
Qty: 90 TABLET | Refills: 3 | Status: SHIPPED | OUTPATIENT
Start: 2020-08-19 | End: 2021-09-02

## 2020-08-19 NOTE — PROGRESS NOTES
"Subjective:      Patient ID: Hernan Fields is a 60 y.o. male.    Chief Complaint: Executive Health    HPI     It was a pleasure to see you again for your Executive  Health Physical on 20. .  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  You are a former smoker who quit in the  .  You occasionally drink alcohol.  Your  neurologist is Dr. Mina.  Your cardiologist is Dr. Agustin.    Your current medications include albuterol, aspirin,  Lipitor, Celexa, clonazepam, metoprolol,  Ranexa, Valtrex, and Viagra.    Your have been allergy to iodine and iodine products.    Your past surgical history includes right orbital  fracture surgery, hernia repair, tonsillectomy,  cholecystectomy in May 2013, left heart  catheterization, and prostate biopsy.    Your family history includes a father   with COPD and hypertension.  Your mother is  at  age 53 with a brain aneurysm.    HEALTH MAINTENANCE:  Your tetanus vaccine is  up-to-date.  Your last colonoscopy was done on  2013 and you are due for repeat in .  Check with your pharmacy regarding new shingles vaccine.   Flu vaccine in the fall.     Review of Systems   Constitutional: Negative for chills and fever.   HENT: Negative for ear pain and sore throat.    Respiratory: Negative for cough.    Cardiovascular: Negative for chest pain.   Gastrointestinal: Negative for abdominal pain and blood in stool.   Genitourinary: Negative for dysuria and hematuria.   Neurological: Negative for seizures and syncope.     Objective:   /75   Pulse (!) 57   Temp 96.9 °F (36.1 °C) (Tympanic)   Ht 6' 1" (1.854 m)   " Wt 82 kg (180 lb 12.4 oz)   BMI 23.85 kg/m²     Physical Exam  Constitutional:       General: He is not in acute distress.     Appearance: He is well-developed.   HENT:      Head: Normocephalic and atraumatic.      Right Ear: External ear normal.      Left Ear: External ear normal.   Eyes:      Pupils: Pupils are equal, round, and reactive to light.   Neck:      Musculoskeletal: Neck supple.      Thyroid: No thyromegaly.   Cardiovascular:      Rate and Rhythm: Normal rate and regular rhythm.   Pulmonary:      Breath sounds: Normal breath sounds. No wheezing or rales.   Abdominal:      General: Bowel sounds are normal.      Palpations: Abdomen is soft.      Tenderness: There is no abdominal tenderness.   Lymphadenopathy:      Cervical: No cervical adenopathy.   Skin:     General: Skin is warm and dry.   Neurological:      Mental Status: He is alert and oriented to person, place, and time.   Psychiatric:         Behavior: Behavior normal.         Assessment:     1. Routine general medical examination at a health care facility    2. Coronary artery disease due to lipid rich plaque    3. Essential hypertension    4. Bronchospasm    5. Anxiety    6. Mixed hyperlipidemia    7. Agatston coronary artery calcium score greater than 400    8. Low testosterone      Plan:   Routine general medical examination at a health care facility  Heart healthy diet and reg exercise  HM reviewed    Coronary artery disease due to lipid rich plaque  -     atorvastatin (LIPITOR) 80 MG tablet; Take 1 tablet (80 mg total) by mouth every evening.  Dispense: 90 tablet; Refill: 3    Essential hypertension  -     metoprolol succinate (TOPROL-XL) 50 MG 24 hr tablet; Take 1 tablet (50 mg total) by mouth once daily.  Dispense: 90 tablet; Refill: 3    Bronchospasm  Orders:  -     albuterol (PROVENTIL/VENTOLIN HFA) 90 mcg/actuation inhaler; Inhale 2 puffs into the lungs every 6 (six) hours as needed for Wheezing or Shortness of Breath.  Dispense: 1 g;  Refill: 3    Anxiety    Mixed hyperlipidemia    Agatston coronary artery calcium score greater than 400        Lab Frequency Next Occurrence   CBC auto differential Once 05/19/2020   Comprehensive metabolic panel Once 01/06/2021   Lipid Panel Once 01/06/2021   High sensitivity CRP (Cardiac CRP) Once 01/06/2021       Problem List Items Addressed This Visit        Psychiatric    Anxiety       Cardiac/Vascular    Agatston coronary artery calcium score greater than 400    Mixed hyperlipidemia    Coronary artery disease    Relevant Medications    atorvastatin (LIPITOR) 80 MG tablet    HTN (hypertension) (Chronic)    Relevant Medications    metoprolol succinate (TOPROL-XL) 50 MG 24 hr tablet      Other Visit Diagnoses     Routine general medical examination at a health care facility    -  Primary    Relevant Orders    Testosterone (Completed)    Bronchospasm        only with exercise  alb helps    Relevant Medications    albuterol (PROVENTIL/VENTOLIN HFA) 90 mcg/actuation inhaler    Low testosterone        Relevant Orders    Ambulatory referral/consult to Endocrinology    Ambulatory referral/consult to Endocrinology          Follow up if symptoms worsen or fail to improve.

## 2020-08-24 ENCOUNTER — TELEPHONE (OUTPATIENT)
Dept: INTERNAL MEDICINE | Facility: CLINIC | Age: 60
End: 2020-08-24

## 2020-08-24 NOTE — TELEPHONE ENCOUNTER
----- Message from Isaac Taylor sent at 8/24/2020  3:04 PM CDT -----  Regarding: Pt [phone]  Patient is returning a phone call.  Who left a message for the patient: He thinks Emlody  Does patient know what this is regarding:  No  Comments:

## 2020-08-24 NOTE — TELEPHONE ENCOUNTER
Gave pt testosterone results. Pt verbalized understanding and stated he made an endocrinology appt

## 2020-08-28 ENCOUNTER — OFFICE VISIT (OUTPATIENT)
Dept: ENDOCRINOLOGY | Facility: CLINIC | Age: 60
End: 2020-08-28
Payer: COMMERCIAL

## 2020-08-28 VITALS
SYSTOLIC BLOOD PRESSURE: 141 MMHG | WEIGHT: 183.44 LBS | BODY MASS INDEX: 24.31 KG/M2 | HEIGHT: 73 IN | HEART RATE: 64 BPM | RESPIRATION RATE: 18 BRPM | DIASTOLIC BLOOD PRESSURE: 82 MMHG

## 2020-08-28 DIAGNOSIS — R79.89 LOW TESTOSTERONE: ICD-10-CM

## 2020-08-28 PROCEDURE — 99999 PR PBB SHADOW E&M-EST. PATIENT-LVL IV: ICD-10-PCS | Mod: PBBFAC,,, | Performed by: INTERNAL MEDICINE

## 2020-08-28 PROCEDURE — 99203 PR OFFICE/OUTPT VISIT, NEW, LEVL III, 30-44 MIN: ICD-10-PCS | Mod: S$GLB,,, | Performed by: INTERNAL MEDICINE

## 2020-08-28 PROCEDURE — 3077F SYST BP >= 140 MM HG: CPT | Mod: CPTII,S$GLB,, | Performed by: INTERNAL MEDICINE

## 2020-08-28 PROCEDURE — 3008F BODY MASS INDEX DOCD: CPT | Mod: CPTII,S$GLB,, | Performed by: INTERNAL MEDICINE

## 2020-08-28 PROCEDURE — 3008F PR BODY MASS INDEX (BMI) DOCUMENTED: ICD-10-PCS | Mod: CPTII,S$GLB,, | Performed by: INTERNAL MEDICINE

## 2020-08-28 PROCEDURE — 99203 OFFICE O/P NEW LOW 30 MIN: CPT | Mod: S$GLB,,, | Performed by: INTERNAL MEDICINE

## 2020-08-28 PROCEDURE — 3077F PR MOST RECENT SYSTOLIC BLOOD PRESSURE >= 140 MM HG: ICD-10-PCS | Mod: CPTII,S$GLB,, | Performed by: INTERNAL MEDICINE

## 2020-08-28 PROCEDURE — 3079F DIAST BP 80-89 MM HG: CPT | Mod: CPTII,S$GLB,, | Performed by: INTERNAL MEDICINE

## 2020-08-28 PROCEDURE — 3079F PR MOST RECENT DIASTOLIC BLOOD PRESSURE 80-89 MM HG: ICD-10-PCS | Mod: CPTII,S$GLB,, | Performed by: INTERNAL MEDICINE

## 2020-08-28 PROCEDURE — 99999 PR PBB SHADOW E&M-EST. PATIENT-LVL IV: CPT | Mod: PBBFAC,,, | Performed by: INTERNAL MEDICINE

## 2020-08-28 NOTE — PROGRESS NOTES
Referring Provider:  Venkata Daniels MD    PCP:  Venkata Daniels MD    Reason for referral:   Low testosterone  CC:  Low testosterone    HPI:  Hernan Fields 60 y.o. male  Patient had physical done and he was found to have low testosterone.  Patient has a history of hypertension.  No history of diabetes or thyroid problem.  No history of trauma to testicles.  No complaints of abnormal sweating, dysphagia, nausea vomiting, chest pain, rash, or edema.  Viagra was used.      Past Medical History:   Diagnosis Date    Allergy     Anxiety     public speaking issues.    Atypical chest pain 2018    Coronary artery disease 12/15/2014    dr alexandra    Elevated PSA     Ex-smoker     one year at 18 y/o    False positive serological test for hepatitis C     H/O: Bell's palsy     affects left side    History of PTCA 2016    Hypertension     Meningitis     Mixed hyperlipidemia 12/15/2014       Past Surgical History:   Procedure Laterality Date    benign pros biops      CHOLECYSTECTOMY  2013    FRACTURE SURGERY      HERNIA REPAIR      SMALL INTESTINE SURGERY      TONSILLECTOMY         Social History     Socioeconomic History    Marital status:      Spouse name: Not on file    Number of children: 1    Years of education: Not on file    Highest education level: Not on file   Occupational History    Occupation: cost analyist     Employer: [employer]     Comment: [employer]   Social Needs    Financial resource strain: Not on file    Food insecurity     Worry: Not on file     Inability: Not on file    Transportation needs     Medical: Not on file     Non-medical: Not on file   Tobacco Use    Smoking status: Former Smoker     Quit date: 1980     Years since quittin.6    Smokeless tobacco: Never Used   Substance and Sexual Activity    Alcohol use: Yes     Alcohol/week: 0.0 standard drinks    Drug use: No    Sexual activity: Yes     Partners: Female   Lifestyle    Physical  activity     Days per week: Not on file     Minutes per session: Not on file    Stress: Not on file   Relationships    Social connections     Talks on phone: Not on file     Gets together: Not on file     Attends Protestant service: Not on file     Active member of club or organization: Not on file     Attends meetings of clubs or organizations: Not on file     Relationship status: Not on file   Other Topics Concern    Not on file   Social History Narrative    Pt works at [employer]         ROS:   No diabetes  Hypertension  No abnormal sweating  Mildly decreased libido  Mild erectile dysfunction  No history of trauma to the testicles  ROS otherwise neg except for what is mentioned in the PMH, PSH and HPI    PE:  Vitals:    08/28/20 0828   BP: (!) 141/82   Pulse: 64   Resp: 18     Alert and oriented  No acute distress  No acne  No Proptosis or conjunctivitis  No rash on tongue, + teeth  No goitre by inspection  Thyroid gland is not palpable  No cervical lymphadenopathy  Heart reg, no gallop  Lungs cta, no wheezing  Abd soft, no tnd  No edema in lower legs  No rash  No bruises  Speech normal  Behavior normal  No tremor  No obesity  Body mass index is 24.2 kg/m².      Lab:    Lab Results   Component Value Date    TSH 1.458 08/19/2020       Lab Results   Component Value Date    CHOL 101 (L) 06/23/2020    TRIG 97 06/23/2020    HDL 32 (L) 06/23/2020    CHOLHDL 31.7 06/23/2020    TOTALCHOLEST 3.2 06/23/2020    NONHDLCHOL 69 06/23/2020       Lab Results   Component Value Date     06/23/2020    K 4.8 06/23/2020     06/23/2020    CO2 28 06/23/2020    BUN 16 06/23/2020    CREATININE 1.0 06/23/2020    CALCIUM 9.7 06/23/2020    ANIONGAP 7 (L) 06/23/2020    ESTGFRAFRICA >60.0 06/23/2020    EGFRNONAA >60.0 06/23/2020     No results found for: CREATRANDUR, MICALBCREAT    A/P:  Low testosterone  Mild hypogonadism to be ruled out  Test to be done next week in the morning for the following  -     Testosterone;  Future; Expected date: 08/29/2020  -     Luteinizing hormone; Future; Expected date: 08/28/2020  -     Prolactin; Future; Expected date: 08/28/2020  -     T4, free; Future; Expected date: 08/28/2020    Appt in 6 months or as needed.      Pt understands the plan and instructions.

## 2020-08-28 NOTE — LETTER
August 28, 2020      Venkata Daniels MD  [address]  [place] [address]           [facility]  [address]  [place] [address]  Phone: [phone]  Fax: [phone]          Patient: Hernan Fields   MR Number: [id number]   YOB: 1960   Date of Visit: 8/28/2020       Dear Dr. Venkata Daniels:    Thank you for referring Hernan Fields to me for evaluation. Attached you will find relevant portions of my assessment and plan of care.    If you have questions, please do not hesitate to call me. I look forward to following Hernan Fields along with you.    Sincerely,    Carolyn Lugo MD    Enclosure  CC:  No Recipients    If you would like to receive this communication electronically, please contact [email or web address] or [phone] to request more information on [employer] access.    For providers and/or their staff who would like to refer a patient to [facility], please contact us through our one-stop-shop provider referral line, [facility], at [phone].    If you feel you have received this communication in error or would no longer like to receive these types of communications, please e-mail [email or web address]

## 2020-09-02 ENCOUNTER — LAB VISIT (OUTPATIENT)
Dept: LAB | Facility: HOSPITAL | Age: 60
End: 2020-09-02
Attending: INTERNAL MEDICINE
Payer: COMMERCIAL

## 2020-09-02 DIAGNOSIS — R79.89 LOW TESTOSTERONE: ICD-10-CM

## 2020-09-02 LAB
LH SERPL-ACNC: 4.1 MIU/ML (ref 0.6–12.1)
PROLACTIN SERPL IA-MCNC: 4.4 NG/ML (ref 3.5–19.4)
T4 FREE SERPL-MCNC: 1.02 NG/DL (ref 0.71–1.51)
TESTOST SERPL-MCNC: 286 NG/DL (ref 304–1227)

## 2020-09-02 PROCEDURE — 84146 ASSAY OF PROLACTIN: CPT

## 2020-09-02 PROCEDURE — 84439 ASSAY OF FREE THYROXINE: CPT

## 2020-09-02 PROCEDURE — 83002 ASSAY OF GONADOTROPIN (LH): CPT

## 2020-09-02 PROCEDURE — 84403 ASSAY OF TOTAL TESTOSTERONE: CPT

## 2020-09-02 PROCEDURE — 36415 COLL VENOUS BLD VENIPUNCTURE: CPT

## 2020-10-15 ENCOUNTER — TELEPHONE (OUTPATIENT)
Dept: ORTHOPEDICS | Facility: CLINIC | Age: 60
End: 2020-10-15

## 2020-10-15 ENCOUNTER — OFFICE VISIT (OUTPATIENT)
Dept: INTERNAL MEDICINE | Facility: CLINIC | Age: 60
End: 2020-10-15
Payer: COMMERCIAL

## 2020-10-15 VITALS
OXYGEN SATURATION: 98 % | BODY MASS INDEX: 24.17 KG/M2 | SYSTOLIC BLOOD PRESSURE: 140 MMHG | TEMPERATURE: 98 F | WEIGHT: 183.19 LBS | HEART RATE: 71 BPM | DIASTOLIC BLOOD PRESSURE: 90 MMHG

## 2020-10-15 DIAGNOSIS — S46.911A STRAIN OF RIGHT SHOULDER, INITIAL ENCOUNTER: Primary | ICD-10-CM

## 2020-10-15 DIAGNOSIS — G89.29 CHRONIC RIGHT SHOULDER PAIN: ICD-10-CM

## 2020-10-15 DIAGNOSIS — M25.511 CHRONIC RIGHT SHOULDER PAIN: ICD-10-CM

## 2020-10-15 DIAGNOSIS — Z23 NEED FOR INFLUENZA VACCINATION: ICD-10-CM

## 2020-10-15 PROCEDURE — 3077F SYST BP >= 140 MM HG: CPT | Mod: CPTII,S$GLB,, | Performed by: INTERNAL MEDICINE

## 2020-10-15 PROCEDURE — 90471 FLU VACCINE (QUAD) GREATER THAN OR EQUAL TO 3YO PRESERVATIVE FREE IM: ICD-10-PCS | Mod: S$GLB,,, | Performed by: INTERNAL MEDICINE

## 2020-10-15 PROCEDURE — 99999 PR PBB SHADOW E&M-EST. PATIENT-LVL IV: CPT | Mod: PBBFAC,,, | Performed by: INTERNAL MEDICINE

## 2020-10-15 PROCEDURE — 3080F PR MOST RECENT DIASTOLIC BLOOD PRESSURE >= 90 MM HG: ICD-10-PCS | Mod: CPTII,S$GLB,, | Performed by: INTERNAL MEDICINE

## 2020-10-15 PROCEDURE — 3008F BODY MASS INDEX DOCD: CPT | Mod: CPTII,S$GLB,, | Performed by: INTERNAL MEDICINE

## 2020-10-15 PROCEDURE — 90471 IMMUNIZATION ADMIN: CPT | Mod: S$GLB,,, | Performed by: INTERNAL MEDICINE

## 2020-10-15 PROCEDURE — 99214 PR OFFICE/OUTPT VISIT, EST, LEVL IV, 30-39 MIN: ICD-10-PCS | Mod: 25,S$GLB,, | Performed by: INTERNAL MEDICINE

## 2020-10-15 PROCEDURE — 90686 FLU VACCINE (QUAD) GREATER THAN OR EQUAL TO 3YO PRESERVATIVE FREE IM: ICD-10-PCS | Mod: S$GLB,,, | Performed by: INTERNAL MEDICINE

## 2020-10-15 PROCEDURE — 99999 PR PBB SHADOW E&M-EST. PATIENT-LVL IV: ICD-10-PCS | Mod: PBBFAC,,, | Performed by: INTERNAL MEDICINE

## 2020-10-15 PROCEDURE — 99214 OFFICE O/P EST MOD 30 MIN: CPT | Mod: 25,S$GLB,, | Performed by: INTERNAL MEDICINE

## 2020-10-15 PROCEDURE — 3008F PR BODY MASS INDEX (BMI) DOCUMENTED: ICD-10-PCS | Mod: CPTII,S$GLB,, | Performed by: INTERNAL MEDICINE

## 2020-10-15 PROCEDURE — 3080F DIAST BP >= 90 MM HG: CPT | Mod: CPTII,S$GLB,, | Performed by: INTERNAL MEDICINE

## 2020-10-15 PROCEDURE — 3077F PR MOST RECENT SYSTOLIC BLOOD PRESSURE >= 140 MM HG: ICD-10-PCS | Mod: CPTII,S$GLB,, | Performed by: INTERNAL MEDICINE

## 2020-10-15 PROCEDURE — 90686 IIV4 VACC NO PRSV 0.5 ML IM: CPT | Mod: S$GLB,,, | Performed by: INTERNAL MEDICINE

## 2020-10-15 RX ORDER — MELOXICAM 15 MG/1
15 TABLET ORAL DAILY
Qty: 7 TABLET | Refills: 0 | Status: SHIPPED | OUTPATIENT
Start: 2020-10-15 | End: 2021-02-03 | Stop reason: ALTCHOICE

## 2020-10-15 RX ORDER — NAPROXEN 500 MG/1
500 TABLET ORAL 2 TIMES DAILY WITH MEALS
Qty: 30 TABLET | Refills: 0 | Status: CANCELLED | OUTPATIENT
Start: 2020-10-15

## 2020-10-15 RX ORDER — TIZANIDINE 2 MG/1
TABLET ORAL
Qty: 30 TABLET | Refills: 0 | Status: SHIPPED | OUTPATIENT
Start: 2020-10-15 | End: 2020-11-06

## 2020-10-15 RX ORDER — CYCLOBENZAPRINE HCL 10 MG
TABLET ORAL
Qty: 30 TABLET | Refills: 0 | Status: CANCELLED | OUTPATIENT
Start: 2020-10-15

## 2020-10-15 NOTE — PROGRESS NOTES
Subjective:      Patient ID: Hernan Fields is a 60 y.o. male.    Chief Complaint: Shoulder Pain    Shoulder Pain   The pain is present in the right shoulder. This is a recurrent problem. Episode onset: 2 mo. The problem occurs daily. The problem has been waxing and waning. The pain is moderate. Associated symptoms include a limited range of motion. Pertinent negatives include no headaches, itching, joint locking, joint swelling, numbness or stiffness. The symptoms are aggravated by activity. He has tried acetaminophen (naproxen) for the symptoms. The treatment provided moderate relief. Family history does not include arthritis. There is no history of diabetes.     Review of Systems   Constitutional: Negative for activity change and unexpected weight change.   HENT: Negative for hearing loss, rhinorrhea and trouble swallowing.    Eyes: Negative for discharge and visual disturbance.   Respiratory: Negative for chest tightness and wheezing.    Cardiovascular: Negative for chest pain and palpitations.   Gastrointestinal: Negative for blood in stool, constipation, diarrhea and vomiting.   Endocrine: Negative for polydipsia and polyuria.   Genitourinary: Negative for difficulty urinating, hematuria and urgency.   Musculoskeletal: Negative for arthralgias, joint swelling, neck pain and stiffness.   Skin: Negative for itching, rash and wound.   Neurological: Negative for weakness, numbness and headaches.   Psychiatric/Behavioral: Negative for confusion and dysphoric mood.     Objective:   BP (!) 140/90 (BP Location: Left arm, Patient Position: Sitting, BP Method: Large (Manual))   Pulse 71   Temp 97.7 °F (36.5 °C) (Tympanic)   Wt 83.1 kg (183 lb 3.2 oz)   SpO2 98%   BMI 24.17 kg/m²     Physical Exam  Constitutional:       General: He is not in acute distress.     Appearance: He is well-developed.   Eyes:      Extraocular Movements: Extraocular movements intact.      Conjunctiva/sclera: Conjunctivae normal.    Cardiovascular:      Rate and Rhythm: Normal rate.   Pulmonary:      Effort: Pulmonary effort is normal.      Breath sounds: Normal breath sounds.   Musculoskeletal:      Right shoulder: He exhibits decreased range of motion (abduction). He exhibits no tenderness, no swelling and no crepitus.   Skin:     General: Skin is warm and dry.   Psychiatric:         Mood and Affect: Mood normal.         Behavior: Behavior normal.         Assessment:     1. Strain of right shoulder, initial encounter    2. Need for influenza vaccination    3. Chronic right shoulder pain      Plan:   Strain of right shoulder, initial encounter  -     tiZANidine (ZANAFLEX) 2 MG tablet; 1/2 to 1 tab qhs prn spasm.  Dispense: 30 tablet; Refill: 0  -     meloxicam (MOBIC) 15 MG tablet; Take 1 tablet (15 mg total) by mouth once daily. For pain and inflammation  Dispense: 7 tablet; Refill: 0    Need for influenza vaccination  -     Influenza - Quadrivalent *Preferred* (6 months+) (PF)    Chronic right shoulder pain  -     Ambulatory referral/consult to Orthopedics; Future; Expected date: 10/22/2020        Lab Frequency Next Occurrence   CBC auto differential Once 05/19/2020   Comprehensive metabolic panel Once 01/06/2021   Lipid Panel Once 01/06/2021   High sensitivity CRP (Cardiac CRP) Once 01/06/2021       Problem List Items Addressed This Visit     None      Visit Diagnoses     Strain of right shoulder, initial encounter    -  Primary    Relevant Medications    tiZANidine (ZANAFLEX) 2 MG tablet    meloxicam (MOBIC) 15 MG tablet    Need for influenza vaccination        Relevant Orders    Influenza - Quadrivalent *Preferred* (6 months+) (PF) (Completed)    Chronic right shoulder pain        Relevant Orders    Ambulatory referral/consult to Orthopedics          Follow up if symptoms worsen or fail to improve.

## 2020-10-29 ENCOUNTER — OFFICE VISIT (OUTPATIENT)
Dept: ORTHOPEDICS | Facility: CLINIC | Age: 60
End: 2020-10-29
Payer: COMMERCIAL

## 2020-10-29 ENCOUNTER — HOSPITAL ENCOUNTER (OUTPATIENT)
Dept: RADIOLOGY | Facility: HOSPITAL | Age: 60
Discharge: HOME | End: 2020-10-29
Attending: PHYSICAL MEDICINE & REHABILITATION
Payer: COMMERCIAL

## 2020-10-29 VITALS
HEART RATE: 56 BPM | SYSTOLIC BLOOD PRESSURE: 110 MMHG | WEIGHT: 183 LBS | BODY MASS INDEX: 24.25 KG/M2 | DIASTOLIC BLOOD PRESSURE: 74 MMHG | HEIGHT: 73 IN

## 2020-10-29 DIAGNOSIS — M75.41 IMPINGEMENT SYNDROME OF RIGHT SHOULDER: ICD-10-CM

## 2020-10-29 DIAGNOSIS — M25.519 SHOULDER PAIN, UNSPECIFIED CHRONICITY, UNSPECIFIED LATERALITY: ICD-10-CM

## 2020-10-29 DIAGNOSIS — G89.29 CHRONIC RIGHT SHOULDER PAIN: ICD-10-CM

## 2020-10-29 DIAGNOSIS — M25.511 CHRONIC RIGHT SHOULDER PAIN: ICD-10-CM

## 2020-10-29 DIAGNOSIS — M25.519 SHOULDER PAIN, UNSPECIFIED CHRONICITY, UNSPECIFIED LATERALITY: Primary | ICD-10-CM

## 2020-10-29 DIAGNOSIS — M77.8 SUBSCAPULARIS TENDINITIS OF RIGHT SHOULDER: Primary | ICD-10-CM

## 2020-10-29 PROCEDURE — 73030 X-RAY EXAM OF SHOULDER: CPT | Mod: 26,RT,, | Performed by: RADIOLOGY

## 2020-10-29 PROCEDURE — 3078F DIAST BP <80 MM HG: CPT | Mod: CPTII,S$GLB,, | Performed by: PHYSICAL MEDICINE & REHABILITATION

## 2020-10-29 PROCEDURE — 3078F PR MOST RECENT DIASTOLIC BLOOD PRESSURE < 80 MM HG: ICD-10-PCS | Mod: CPTII,S$GLB,, | Performed by: PHYSICAL MEDICINE & REHABILITATION

## 2020-10-29 PROCEDURE — 3008F PR BODY MASS INDEX (BMI) DOCUMENTED: ICD-10-PCS | Mod: CPTII,S$GLB,, | Performed by: PHYSICAL MEDICINE & REHABILITATION

## 2020-10-29 PROCEDURE — 3008F BODY MASS INDEX DOCD: CPT | Mod: CPTII,S$GLB,, | Performed by: PHYSICAL MEDICINE & REHABILITATION

## 2020-10-29 PROCEDURE — 73030 X-RAY EXAM OF SHOULDER: CPT | Mod: TC,RT

## 2020-10-29 PROCEDURE — 73030 XR SHOULDER COMPLETE 2 OR MORE VIEWS RIGHT: ICD-10-PCS | Mod: 26,RT,, | Performed by: RADIOLOGY

## 2020-10-29 PROCEDURE — 99999 PR PBB SHADOW E&M-EST. PATIENT-LVL V: ICD-10-PCS | Mod: PBBFAC,,, | Performed by: PHYSICAL MEDICINE & REHABILITATION

## 2020-10-29 PROCEDURE — 99204 OFFICE O/P NEW MOD 45 MIN: CPT | Mod: S$GLB,,, | Performed by: PHYSICAL MEDICINE & REHABILITATION

## 2020-10-29 PROCEDURE — 3074F SYST BP LT 130 MM HG: CPT | Mod: CPTII,S$GLB,, | Performed by: PHYSICAL MEDICINE & REHABILITATION

## 2020-10-29 PROCEDURE — 3074F PR MOST RECENT SYSTOLIC BLOOD PRESSURE < 130 MM HG: ICD-10-PCS | Mod: CPTII,S$GLB,, | Performed by: PHYSICAL MEDICINE & REHABILITATION

## 2020-10-29 PROCEDURE — 99999 PR PBB SHADOW E&M-EST. PATIENT-LVL V: CPT | Mod: PBBFAC,,, | Performed by: PHYSICAL MEDICINE & REHABILITATION

## 2020-10-29 PROCEDURE — 99204 PR OFFICE/OUTPT VISIT, NEW, LEVL IV, 45-59 MIN: ICD-10-PCS | Mod: S$GLB,,, | Performed by: PHYSICAL MEDICINE & REHABILITATION

## 2020-10-29 NOTE — PROGRESS NOTES
SPORTS MEDICINE / PM&R New Patient Visit :    Referring Physician: Venkata Daniels MD    Chief Complaint   Patient presents with    Right Shoulder - Pain       HPI: This is a 60 y.o.  male being seen in clinic today for evaluation of Pain of the Right Shoulder  .  The problem began about two years ago without inciting event or injury. He feels sharp, aching and intermittent pain around his right shoulder . The symptoms show no change. He has tried ice, heat, massage therapy, anti-inflammatories with limited improvement. He has not tried therapy. Most limited reaching towards low back and sharp pain with lifting above level of shoulder.     History obtained from patient.    Past family, medical, social, surgical history, and vital signs reviewed in chart.    Review of Systems   Constitutional: Negative for chills, fever and weight loss.   HENT: Negative for hearing loss and sore throat.    Eyes: Negative for blurred vision, photophobia and pain.   Respiratory: Negative for shortness of breath.    Cardiovascular: Negative for chest pain.   Gastrointestinal: Negative for abdominal pain.   Genitourinary: Negative for dysuria.   Skin: Negative for rash.   Neurological: Negative for tingling and headaches.   Endo/Heme/Allergies: Does not bruise/bleed easily.   Psychiatric/Behavioral: Negative for depression.       General    Nursing note and vitals reviewed.  Constitutional: He is oriented to person, place, and time. He appears well-developed and well-nourished.   HENT:   Head: Normocephalic and atraumatic.   Eyes: Conjunctivae and EOM are normal. Pupils are equal, round, and reactive to light.   Neck: Neck supple.   Cardiovascular: Intact distal pulses.    Pulmonary/Chest: Effort normal. No respiratory distress.   Abdominal: He exhibits no distension.   Neurological: He is alert and oriented to person, place, and time. He has normal reflexes.   Psychiatric: He has a normal mood and affect.         Right Shoulder Exam      Inspection/Observation   Swelling: absent  Bruising: absent  Scars: absent  Deformity: absent  Scapular Winging: absent  Scapular Dyskinesia: positive    Tenderness   The patient is tender to palpation of the greater tuberosity.    Range of Motion   Active abduction:  130 abnormal   Passive abduction: normal   Extension:  20 abnormal   Forward Flexion:  140 abnormal   Adduction: normal  External Rotation 0 degrees: normal   Internal rotation 0 degrees:  Sacrum abnormal     Tests & Signs   Apprehension: positive (borderline)  Cross arm: negative  Pruett test: negative  Impingement: negative  Sulcus: absent  Rotator Cuff Painful Arc/Range: mild  Lift Off Sign: positive  Belly Press: positive  Active Compression Test (Prowers's Sign): negative  Speed's Test: negative  Anterior Drawer Test: 0   Posterior Drawer Test: 0  Bear Hug: positive  Jerk Test: negative    Other   Sensation: normal    Left Shoulder Exam     Inspection/Observation   Scars: absent  Scapular Winging: absent  Scapular Dyskinesia: positive    Range of Motion   Active abduction:  150 abnormal   Passive abduction: normal   Extension: normal   Forward Flexion: abnormal   Adduction: normal  External Rotation 0 degrees: normal   Internal rotation 0 degrees: normal     Muscle Strength   The patient has normal left shoulder strength.    Tests & Signs   Apprehension: negative  Pruett test: negative  Impingement: negative  Sulcus: absent  Speed's Test: negative  Jerk Test: negative    Other   Sensation: normal       Muscle Strength   Right Upper Extremity   Shoulder Abduction: 5/5   Shoulder Internal Rotation: 4/5   Shoulder External Rotation: 5/5   Supraspinatus: 5/5   Subscapularis: 4/5   Biceps: 5/5     Reflexes     Left Side  Biceps:  2+  Brachioradialis:  2+  Left Cordova's Sign:  Absent    Right Side   Biceps:  2+  Brachioradialis:  2+  Right Cordova's Sign:  absent    Vascular Exam     Right Pulses      Radial:                    2+      Left  Pulses      Radial:                    2+      Capillary Refill  Right Hand: normal capillary refill  Left Hand: normal capillary refill      No results found.      IMPRESSION/PLAN: This is a 60 y.o.  male with:    Subscapularis tendinitis of right shoulder  -     Ambulatory referral/consult to Physical/Occupational Therapy; Future; Expected date: 11/05/2020    Chronic right shoulder pain  -     Ambulatory referral/consult to Orthopedics  -     Ambulatory referral/consult to Physical/Occupational Therapy; Future; Expected date: 11/05/2020    Impingement syndrome of right shoulder  -     Ambulatory referral/consult to Physical/Occupational Therapy; Future; Expected date: 11/05/2020        The diagnosis and treatment options were discussed with Hernan in detail.  We personally reviewed his imaging today in clinic.  Still awaiting radiology report and will follow up on that.  Show some mild degenerative changes about the glenohumeral joint otherwise no acute fracture, dislocation, or other pathology noted.  He did have findings suggestive of at least partial tear of the subscapularis rotator cuff muscle as well as findings of general shoulder impingement.  Since he has not tried physical therapy that be the best place to start.  He is reluctant to take more anti-inflammatories will hold off on that.  If not improving in 4 to 6 weeks will consider MRI.    He was provided with this plan in writing. All of his questions were answered. He will follow up with me in 6 weeks.     Disclaimer: This note was prepared using a voice recognition system and is likely to have sound alike errors within the text.     Trice Logan M.D.  Sports Medicine

## 2020-10-29 NOTE — LETTER
October 29, 2020      Venkata Daniels MD  [id number] [facility] [address]           [facility]  [address]  [place] [address]  Phone: [phone]  Fax: [phone]          Patient: Hernan Fields   MR Number: [id number]   YOB: 1960   Date of Visit: 10/29/2020       Dear Dr. Venkata Daniels:    Thank you for referring Hernan Fields to me for evaluation. Attached you will find relevant portions of my assessment and plan of care.    If you have questions, please do not hesitate to call me. I look forward to following Hernan Fields along with you.    Sincerely,    Trice Logan MD    Enclosure  CC:  No Recipients    If you would like to receive this communication electronically, please contact [email or web address] or [phone] to request more information on [email or web address] Link access.    For providers and/or their staff who would like to refer a patient to [facility], please contact us through our one-stop-shop provider referral line, [facility], at [phone].    If you feel you have received this communication in error or would no longer like to receive these types of communications, please e-mail [email or web address]

## 2020-10-29 NOTE — PATIENT INSTRUCTIONS
Understanding Shoulder Impingement Syndrome    Shoulder impingement syndrome is a problem with the shoulder joint. It occurs when certain parts within the joint swell and are pinched. This can cause nagging pain and problems with moving the arm.  What causes shoulder impingement syndrome?  It is possible to develop impingement after years of normal shoulder use. But in most cases the condition occurs because of repeated overhead movements. These include such things as stocking shelves, painting, swimming, and throwing. These movements can irritate parts of the shoulder, leading to swelling. Swollen parts of the shoulder take up more room, making the joint space smaller. Some parts that may be involved include:  · A sac of fluid (bursa) that cushions the shoulder joint.  When the bursa is irritated, it may lead to a condition called bursitis. This is when the bursa swells with fluid, filling and squeezing the joint space.  · Fibrous tissues (tendons) that connect muscle to bone. When tendons are irritated, they may become swollen. This is called tendonitis.  · The end (acromion) of the shoulder blade. This bone may be flat or hooked. If the acromion is hooked, the joint space may be smaller than normal. Growths (bone spurs) on the acromion can also narrow the joint space. Acromion problems dont often cause impingement. But they can make it worse.  Symptoms of shoulder impingement syndrome  Symptoms include pain, pinching, or stiffness in your shoulder. Pain often comes with movement, particularly reaching overhead or backward. It may also be felt when the shoulder is at rest. Pain at night during sleep is common.  Treatment for shoulder impingement syndrome  Treatment will depend on the cause of the problem, how bad the problem is, and if other parts of the shoulder are damaged. Treatment may include the following:  · Active rest. This allows the shoulder to heal. It means using the arm and shoulder, but avoiding  activities that cause pain. These likely include reaching overhead or sleeping on your shoulder.  · Cold packs. These help reduce swelling and relieve pain.  · Prescription or over-the-counter pain medicines. These help relieve pain and swelling.  · Arm and shoulder exercises. These help keep your shoulder joint mobile as it heals. They also help improve muscle strength around the joint.  · Shots of medicine into the joint. This can help reduce swelling and pain for a short time.  If other measures dont work to relieve symptoms, you may need surgery. This opens up space in the joint to allow pain-free motion.  Possible complications of shoulder impingement syndrome  It might be tempting to stop using your shoulder completely to avoid pain. But doing so may lead to a condition called frozen shoulder. To help prevent this, follow instructions you are given for active rest and for doing exercises to help your shoulder heal.  When to call your healthcare provider  Call your healthcare provider right away if you have any of these:  · Fever of 100.4°F (38°C) or higher, or as directed  · Symptoms that dont get better, or get worse  · New symptoms   Date Last Reviewed: 3/10/2016  © 7348-8964 [employer]. [address] [place] [address]. All rights reserved. This information is not intended as a substitute for professional medical care. Always follow your healthcare professional's instructions.        Nonsurgical Treatment Options for Shoulder Impingement    Rest is key to healing your shoulder. If an activity hurts, dont do it. Otherwise, you may prevent healing and increase pain. Your shoulder needs active rest. This means avoiding overhead movements and activities that cause pain. But DO NOT stop using your shoulder completely. This can cause it to stiffen or freeze. In addition to rest, impingement can be treated a number of ways. Your healthcare provider can help you find which of these is  best for you.  A physical therapist can also help you with exercises specific for your condition.  ? Ice  Ice reduces inflammation and relieves pain. Apply an ice pack for about 15 minutes, 3 times a day. You can also use a bag of frozen peas instead of an ice pack. A pillow placed under your arm may help make you more comfortable.  Note: Dont put the cold item directly on your skin. Place it on top of your shirt, or wrap it in a thin towel or washcloth.  ? Heat  Heat may soothe aching muscles, but it wont reduce inflammation. Use a heating pad or take a warm shower or bath. Do this for 15 minutes at a time.  Note: Avoid heat when pain is constant. Heat is best when used for warming up before an activity. You can also alternate ice and heat.  ? Medicine  To relieve pain and inflammation, try over-the-counter pain relievers, such as acetaminophen or ibuprofen. Or, your healthcare provider may prescribe medicines. Ask how and when to take your medicine. Be sure to follow all instructions youre given.  ? Electrical stimulation  Electrical stimulation can help reduce pain and swelling. Your healthcare provider attaches small pads to your shoulder. A mild electric current then flows into your shoulder. You may feel tingling, but you should not feel pain.  ? Ultrasound  Ultrasound can help reduce pain. First a slick gel or medicated cream is applied to your shoulder. Then your healthcare provider places a small device over the area. The device uses sound waves to loosen shoulder tightness. This treatment should be pain-free.  ? Injection therapy  Injection therapy may be used to help diagnose your problem. It may also be used to reduce pain and inflammation. The injection typically includes two medicines. One is an anesthetic to numb the shoulder. The other is a steroid, such as cortisone, to help reduce painful swelling. It can take from a few hours to a couple of days before the injection helps. Talk to  your healthcare provider about the possible risks and benefits of this therapy.  Date Last Reviewed: 10/14/2015  © 6878-5222 [employer]. [address] [place] [address], [place] [address]. All rights reserved. This information is not intended as a substitute for professional medical care. Always follow your healthcare professional's instructions.

## 2020-11-16 NOTE — PROGRESS NOTES
Digital Medicine: Clinician Follow-Up    Patient reports doing well without concern.  Apologizes for not calling back.  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    Charged monitor last night.  Thinks the last charge was over a month ago.    The history is provided by the patient.      Review of patient's allergies indicates:   -- Iodine and iodide containing products -- Hives   -- Iodine -- Rash    HYPERTENSION  Explained hypertension digital medicine goals including BP goal less than or equal to 130/80mmHg, improved convenience of BP management and reduced risk of heart attack, kidney failure, stroke, eye disease, dementia, and death.     Explained non-pharmacologic therapies like low salt diet and physical activity can reduce blood pressure.       Explained that we expect patient to submit several blood pressure readings per week at random times of the day, but at least 30 minutes after taking blood pressure medications. Instructed patient not to allow anyone else to use their blood pressure monitor and phone as data submitted is directly entered into medical record. Reviewed and confirmed appropriate blood pressure monitoring technique.           Patient's BP goal is less than or equal to 130/80. Patients BP average is 134/85 mmHg, which is at goal, per 2017 ACC/AHA Hypertension Guidelines.         Last 5 Patient Entered Readings                                      Current 30 Day Average: 134/85     Recent Readings 11/13/2020 11/12/2020 11/11/2020 11/10/2020 11/10/2020    SBP (mmHg) 154 124 139 125 146    DBP (mmHg) 102 73 90 90 88    Pulse 68 72 65 73 69                 Depression Screening  Did not address depression screening.    Sleep Apnea Screening    Did not address sleep apnea screening.     Medication Affordability Screening  Did not address medication affordability screening.     Medication Adherence-Medication Adherence not addressed.          ASSESSMENT(S)  Patients  BP average is 134/85 mmHg, which is at goal. Patient's BP goal is less than or equal to 130/80.    Hypertension Plan  Continue current therapy. Trending up but likely due to low battery.  Continue current diet/physical activity routine.  Instructed to charge device.       Addressed patient questions and patient has my contact information if needed prior to next outreach. Patient verbalizes understanding.      Explained the importance of self-monitoring and medication adherence. Encouraged the patient to communicate with their health  for lifestyle modifications to help improve or maintain a healthy lifestyle.        Sent link to [facility]'s Digital Medicine webpages and my contact information via [employer] for future questions.        Explained to the patient that the Digital Medicine team is not available for emergencies. Advised patient call [facility] ([phone] or [phone]) or [phone] if needed.            There are no preventive care reminders to display for this patient.  There are no preventive care reminders to display for this patient.      Hypertension Medications             metoprolol succinate (TOPROL-XL) 50 MG 24 hr tablet Take 1 tablet (50 mg total) by mouth once daily.

## 2020-11-25 ENCOUNTER — PATIENT MESSAGE (OUTPATIENT)
Dept: INTERNAL MEDICINE | Facility: CLINIC | Age: 60
End: 2020-11-25

## 2020-11-30 NOTE — PROGRESS NOTES
Digital Medicine: Health  Follow-Up    The history is provided by the patient.                     Topics Covered on Call: physical activity and Diet    Additional Follow-up details: Patient reports feeling well. No complaints.             Diet-Change  24 hour dietary recall  Breakfast is typically between. 4- 6 egg whites + raisin toast + protein shake .  Lunch is typically between. Grilled chicken nuggets from [employer] .   Dinner is typically between. Baked ground beef taco pie .   Patient reports eating or drinking the following: He normally eats a lot of chicken   Dietary Improvements:Bought Mrs. Busby, he's trying to be mindful of sodium intake.   We discussed focusing on incorporating more fruits and vegetables to his diet                 Physical Activity-Change      Additional physical activity details: Runs a couple times 2-3 times per week 2-3 miles   He lifts weights 2x per week.      Medication Adherence-        He takes his blood pressure medication qhs.    He checks his bp at various times.   Encouraged patient to check for resting bp readings.       Instructed to charge device.  Provided patient education.  Reviewed Device Techniques.     Addressed patient questions and patient has my contact information if needed prior to next outreach. Patient verbalizes understanding.      Explained the importance of self-monitoring and medication adherence. Encouraged the patient to communicate with their health  for lifestyle modifications to help improve or maintain a healthy lifestyle.               There are no preventive care reminders to display for this patient.      Last 5 Patient Entered Readings                                      Current 30 Day Average: 133/89     Recent Readings 11/29/2020 11/26/2020 11/24/2020 11/21/2020 11/19/2020    SBP (mmHg) 123 140 135 123 145    DBP (mmHg) 81 90 81 92 92    Pulse 72 67 69 65 66

## 2020-12-02 ENCOUNTER — PATIENT MESSAGE (OUTPATIENT)
Dept: INTERNAL MEDICINE | Facility: CLINIC | Age: 60
End: 2020-12-02

## 2020-12-02 DIAGNOSIS — F41.9 ANXIETY: ICD-10-CM

## 2020-12-02 RX ORDER — CLONAZEPAM 0.5 MG/1
0.5 TABLET ORAL 2 TIMES DAILY PRN
Qty: 30 TABLET | Refills: 0 | Status: SHIPPED | OUTPATIENT
Start: 2020-12-02 | End: 2021-09-30 | Stop reason: SDUPTHER

## 2020-12-07 ENCOUNTER — TELEPHONE (OUTPATIENT)
Dept: ORTHOPEDICS | Facility: CLINIC | Age: 60
End: 2020-12-07

## 2020-12-07 NOTE — TELEPHONE ENCOUNTER
Called pt to reschedule his apt on 12/10 due to dr. Logan being out office at the time. Pt states she needs to reschedule to 12/17 due to work schedule. Pt understood.

## 2020-12-17 ENCOUNTER — OFFICE VISIT (OUTPATIENT)
Dept: ORTHOPEDICS | Facility: CLINIC | Age: 60
End: 2020-12-17
Payer: COMMERCIAL

## 2020-12-17 VITALS
HEIGHT: 73 IN | SYSTOLIC BLOOD PRESSURE: 134 MMHG | DIASTOLIC BLOOD PRESSURE: 83 MMHG | WEIGHT: 183 LBS | HEART RATE: 71 BPM | BODY MASS INDEX: 24.25 KG/M2

## 2020-12-17 DIAGNOSIS — M77.8 SUBSCAPULARIS TENDINITIS OF RIGHT SHOULDER: ICD-10-CM

## 2020-12-17 DIAGNOSIS — G89.29 CHRONIC RIGHT SHOULDER PAIN: Primary | ICD-10-CM

## 2020-12-17 DIAGNOSIS — M75.41 IMPINGEMENT SYNDROME OF RIGHT SHOULDER: ICD-10-CM

## 2020-12-17 DIAGNOSIS — M25.511 CHRONIC RIGHT SHOULDER PAIN: Primary | ICD-10-CM

## 2020-12-17 PROCEDURE — 99214 PR OFFICE/OUTPT VISIT, EST, LEVL IV, 30-39 MIN: ICD-10-PCS | Mod: S$GLB,,, | Performed by: PHYSICAL MEDICINE & REHABILITATION

## 2020-12-17 PROCEDURE — 3075F SYST BP GE 130 - 139MM HG: CPT | Mod: CPTII,S$GLB,, | Performed by: PHYSICAL MEDICINE & REHABILITATION

## 2020-12-17 PROCEDURE — 3079F DIAST BP 80-89 MM HG: CPT | Mod: CPTII,S$GLB,, | Performed by: PHYSICAL MEDICINE & REHABILITATION

## 2020-12-17 PROCEDURE — 97110 THERAPEUTIC EXERCISES: CPT | Mod: GP,S$GLB,, | Performed by: PHYSICAL MEDICINE & REHABILITATION

## 2020-12-17 PROCEDURE — 3075F PR MOST RECENT SYSTOLIC BLOOD PRESS GE 130-139MM HG: ICD-10-PCS | Mod: CPTII,S$GLB,, | Performed by: PHYSICAL MEDICINE & REHABILITATION

## 2020-12-17 PROCEDURE — 3008F BODY MASS INDEX DOCD: CPT | Mod: CPTII,S$GLB,, | Performed by: PHYSICAL MEDICINE & REHABILITATION

## 2020-12-17 PROCEDURE — 1125F AMNT PAIN NOTED PAIN PRSNT: CPT | Mod: S$GLB,,, | Performed by: PHYSICAL MEDICINE & REHABILITATION

## 2020-12-17 PROCEDURE — 99999 PR PBB SHADOW E&M-EST. PATIENT-LVL IV: CPT | Mod: PBBFAC,,, | Performed by: PHYSICAL MEDICINE & REHABILITATION

## 2020-12-17 PROCEDURE — 99999 PR PBB SHADOW E&M-EST. PATIENT-LVL IV: ICD-10-PCS | Mod: PBBFAC,,, | Performed by: PHYSICAL MEDICINE & REHABILITATION

## 2020-12-17 PROCEDURE — 3079F PR MOST RECENT DIASTOLIC BLOOD PRESSURE 80-89 MM HG: ICD-10-PCS | Mod: CPTII,S$GLB,, | Performed by: PHYSICAL MEDICINE & REHABILITATION

## 2020-12-17 PROCEDURE — 3008F PR BODY MASS INDEX (BMI) DOCUMENTED: ICD-10-PCS | Mod: CPTII,S$GLB,, | Performed by: PHYSICAL MEDICINE & REHABILITATION

## 2020-12-17 PROCEDURE — 99214 OFFICE O/P EST MOD 30 MIN: CPT | Mod: S$GLB,,, | Performed by: PHYSICAL MEDICINE & REHABILITATION

## 2020-12-17 PROCEDURE — 1125F PR PAIN SEVERITY QUANTIFIED, PAIN PRESENT: ICD-10-PCS | Mod: S$GLB,,, | Performed by: PHYSICAL MEDICINE & REHABILITATION

## 2020-12-17 PROCEDURE — 97110 PR THERAPEUTIC EXERCISES: ICD-10-PCS | Mod: GP,S$GLB,, | Performed by: PHYSICAL MEDICINE & REHABILITATION

## 2020-12-17 NOTE — PROGRESS NOTES
SPORTS MEDICINE / PM&R Follow Up Visit :    Referring Physician: No ref. provider found    Chief Complaint   Patient presents with    Right Shoulder - Pain       HPI: This is a 60 y.o.  male being seen in clinic today for evaluation of Pain of the Right Shoulder       Since last visit, the symptoms are improving.  He has not been to therapy, states he was never called but PT clinic says they attempted several times without response. He notes it's been subsiding on it's own. ROM improved, still feels it in some positions.  States he is willing to do exercises to help prevent it from returning.    History obtained from patient.    Past family, medical, social, surgical history, and vital signs reviewed in chart.    Review of Systems   Constitutional: Negative for chills, fever and weight loss.   HENT: Negative for hearing loss and sore throat.    Eyes: Negative for blurred vision, photophobia and pain.   Respiratory: Negative for shortness of breath.    Cardiovascular: Negative for chest pain.   Gastrointestinal: Negative for abdominal pain.   Genitourinary: Negative for dysuria.   Skin: Negative for rash.   Neurological: Negative for tingling and headaches.   Endo/Heme/Allergies: Does not bruise/bleed easily.   Psychiatric/Behavioral: Negative for depression.       General    Nursing note and vitals reviewed.  Constitutional: He is oriented to person, place, and time. He appears well-developed and well-nourished.   HENT:   Head: Normocephalic and atraumatic.   Eyes: Conjunctivae and EOM are normal. Pupils are equal, round, and reactive to light.   Neck: Neck supple.   Cardiovascular: Intact distal pulses.    Pulmonary/Chest: Effort normal. No respiratory distress.   Abdominal: He exhibits no distension.   Neurological: He is alert and oriented to person, place, and time. He has normal reflexes.   Psychiatric: He has a normal mood and affect.         Right Shoulder Exam     Inspection/Observation   Swelling:  absent  Bruising: absent  Scars: absent  Deformity: absent  Scapular Winging: absent  Scapular Dyskinesia: positive    Tenderness   The patient is tender to palpation of the greater tuberosity.    Range of Motion   Active abduction:  170 abnormal   Passive abduction: normal   Extension:  40 abnormal   Forward Flexion:  170 abnormal   Adduction: normal  External Rotation 0 degrees: normal   Internal rotation 0 degrees:  T8 abnormal     Tests & Signs   Apprehension: negative (borderline)  Cross arm: negative  Pruett test: negative  Impingement: negative  Sulcus: absent  Lift Off Sign: positive  Belly Press: negative  Active Compression Test (Tupelo's Sign): negative  Speed's Test: negative  Anterior Drawer Test: 0   Posterior Drawer Test: 0  Bear Hug: positive  Jerk Test: negative    Other   Sensation: normal    Left Shoulder Exam     Inspection/Observation   Scars: absent  Scapular Winging: absent  Scapular Dyskinesia: positive    Range of Motion   Active abduction:  170 abnormal   Passive abduction: normal   Extension: normal   Forward Flexion: abnormal   Adduction: normal  External Rotation 0 degrees: normal   Internal rotation 0 degrees: normal     Muscle Strength   The patient has normal left shoulder strength.    Tests & Signs   Apprehension: negative  Pruett test: negative  Impingement: negative  Sulcus: absent  Speed's Test: negative  Jerk Test: negative    Other   Sensation: normal       Muscle Strength   Right Upper Extremity   Shoulder Abduction: 5/5   Shoulder Internal Rotation: 5/5   Shoulder External Rotation: 5/5   Supraspinatus: 5/5   Subscapularis: 4/5   Biceps: 5/5     Reflexes     Left Side  Biceps:  2+  Brachioradialis:  2+  Left Cordova's Sign:  Absent    Right Side   Biceps:  2+  Brachioradialis:  2+  Right Cordova's Sign:  absent    Vascular Exam     Right Pulses      Radial:                    2+      Left Pulses      Radial:                    2+      Capillary Refill  Right Hand: normal  capillary refill  Left Hand: normal capillary refill        IMPRESSION/PLAN: This is a 60 y.o.  male with:    Chronic right shoulder pain    Subscapularis tendinitis of right shoulder    Impingement syndrome of right shoulder        The findings were discussed with Hernan in detail. Since he's feeling much better, we'll hold off on trying to get him back into therapy.  Fortunately his exam has significantly improved as well.  He was given a take-home plan today of scapular stabilization exercises that he can work on at home.  He states he has the equipment necessary to perform these exercises. At least 15 minutes were spent teaching the patient a therapeutic home exercise program and they were provided this plan in writing.  He was provided with this plan in writing. All of his questions were answered. He will follow up with me as needed if symptoms worsen again.     Disclaimer: This note was prepared using a voice recognition system and is likely to have sound alike errors within the text.     Trice Logan M.D.  Sports Medicine

## 2020-12-17 NOTE — PATIENT INSTRUCTIONS
Shoulder Blade Rehab:    Scapula/Back Mobilization:  Prior to working out, or about every other day, spend a minute or two rolling out tight spots behind the neck and around the shoulder blade      Pec Minor Mobilization: Rolling out Pec Minor can help loosen this muscle up as well, helping shoulder positioning and posture.       Scapula Exercises    Pick one exercise from group A & B below to do 3 days a week. Shoot for roughly 3 sets of 10 reps of each exercise. Make sure to use good technique, keep ribs down and in, and do as many as you can until you are fatigued.     Group A:    Band over door straight arm pull down:  Theraband over doorway, straight arm pull down. Squeeze shoulder blades down and back. Rotate head to each side, then relax arms. That's one rep.     Theraband over doorway seated pull down:      Theraband Rows:        Group B:    Band Dislocates:       Band Pull-Aparts: Palms up, thumbs pointing out instead of overhand  like in the picture.    Stretching:    Stretch: Tight Pec Minor muscles can keep the shoulder rolled forward, worsening thoracic posture. Daily pec minor stretching for a few minutes can eventually loosen this up. You do want the elbow to be above the level of the shoulder, like where the cameron has his arm. If you use the floor stretch like the girl, raise your arm about 30 degrees higher than hers.    or       Shoulder Exercises: External Rotation    Strengthening exercises help make your injured shoulder more stable. To warm up, do flexibility (stretching) exercises first. Your healthcare provider will tell you what size hand weights to use for the strengthening exercise below. If you dont have hand weights, try using cans of soup instead:  · Lie on your uninjured side with your head supported by a pillow or your arm. Place a small rolled-up towel under your top elbow.  · Grasp a hand weight with your top hand and bend that arm to a right angle, resting your forearm against  your stomach.  · Keeping your elbow against the towel, slowly lift the weight until your forearm is slightly higher than your elbow. Return to the starting position. Repeat.  · Work up to 5 to 15 lifts.  Date Last Reviewed: 8/16/2015  © 7756-9075 [employer]. [address] [place] [address]. All rights reserved. This information is not intended as a substitute for professional medical care. Always follow your healthcare professional's instructions.        Shoulder Exercises: Internal Rotation    Strengthening exercises help make your injured shoulder more stable. To warm up, do flexibility (stretching) exercises first. Your healthcare provider will tell you what size hand weights to use for the strengthening exercise below. If you dont have hand weights, try using cans of soup instead:  · With knees bent, lie on a firm surface. Using the hand on the same side as your injured shoulder, grasp a weight. Bend that arm to a right angle (90 degrees).  · Rest your elbow on the floor.  · Keeping your elbow next to your side, lower your forearm toward the floor, away from your body. Do not lower your hand all the way to the floor.  · Slowly return your forearm to your side. Repeat.  · Work up to 5 to 15 lifts.     Note: Support your head and neck with a pillow.   Date Last Reviewed: 9/30/2015  © 3943-5787 [employer]. [address] [place] [address]. All rights reserved. This information is not intended as a substitute for professional medical care. Always follow your healthcare professional's instructions.

## 2020-12-21 ENCOUNTER — LAB VISIT (OUTPATIENT)
Dept: LAB | Facility: HOSPITAL | Age: 60
End: 2020-12-21
Attending: INTERNAL MEDICINE
Payer: COMMERCIAL

## 2020-12-21 DIAGNOSIS — I25.118 CORONARY ARTERY DISEASE OF NATIVE ARTERY OF NATIVE HEART WITH STABLE ANGINA PECTORIS: ICD-10-CM

## 2020-12-21 DIAGNOSIS — E78.2 MIXED HYPERLIPIDEMIA: ICD-10-CM

## 2020-12-21 LAB
ALBUMIN SERPL BCP-MCNC: 4.1 G/DL (ref 3.5–5.2)
ALP SERPL-CCNC: 75 U/L (ref 55–135)
ALT SERPL W/O P-5'-P-CCNC: 27 U/L (ref 10–44)
ANION GAP SERPL CALC-SCNC: 8 MMOL/L (ref 8–16)
AST SERPL-CCNC: 23 U/L (ref 10–40)
BILIRUB SERPL-MCNC: 0.5 MG/DL (ref 0.1–1)
BUN SERPL-MCNC: 13 MG/DL (ref 6–20)
CALCIUM SERPL-MCNC: 9.3 MG/DL (ref 8.7–10.5)
CHLORIDE SERPL-SCNC: 105 MMOL/L (ref 95–110)
CHOLEST SERPL-MCNC: 99 MG/DL (ref 120–199)
CHOLEST/HDLC SERPL: 2.8 {RATIO} (ref 2–5)
CO2 SERPL-SCNC: 32 MMOL/L (ref 23–29)
CREAT SERPL-MCNC: 1 MG/DL (ref 0.5–1.4)
CRP SERPL-MCNC: 0.35 MG/L (ref 0–3.19)
EST. GFR  (AFRICAN AMERICAN): >60 ML/MIN/1.73 M^2
EST. GFR  (NON AFRICAN AMERICAN): >60 ML/MIN/1.73 M^2
GLUCOSE SERPL-MCNC: 100 MG/DL (ref 70–110)
HDLC SERPL-MCNC: 36 MG/DL (ref 40–75)
HDLC SERPL: 36.4 % (ref 20–50)
LDLC SERPL CALC-MCNC: 47.8 MG/DL (ref 63–159)
NONHDLC SERPL-MCNC: 63 MG/DL
POTASSIUM SERPL-SCNC: 4.5 MMOL/L (ref 3.5–5.1)
PROT SERPL-MCNC: 7 G/DL (ref 6–8.4)
SODIUM SERPL-SCNC: 145 MMOL/L (ref 136–145)
TRIGL SERPL-MCNC: 76 MG/DL (ref 30–150)

## 2020-12-21 PROCEDURE — 36415 COLL VENOUS BLD VENIPUNCTURE: CPT

## 2020-12-21 PROCEDURE — 80061 LIPID PANEL: CPT

## 2020-12-21 PROCEDURE — 80053 COMPREHEN METABOLIC PANEL: CPT

## 2020-12-21 PROCEDURE — 86141 C-REACTIVE PROTEIN HS: CPT

## 2020-12-22 ENCOUNTER — TELEPHONE (OUTPATIENT)
Dept: CARDIOLOGY | Facility: CLINIC | Age: 60
End: 2020-12-22

## 2020-12-23 NOTE — TELEPHONE ENCOUNTER
Spoke with pt and informed him,    Labs are stable.   Lipids good.   f/u appt made in clinic in 2 months.    Understanding was verbalized with no questions/concerns.

## 2020-12-23 NOTE — TELEPHONE ENCOUNTER
Please call pt.  Labs are stable.  Lipids good.  Needs f/u appt in clinic in 2 months.  Please schedule.    Dr Agustin

## 2020-12-28 ENCOUNTER — PATIENT MESSAGE (OUTPATIENT)
Dept: INTERNAL MEDICINE | Facility: CLINIC | Age: 60
End: 2020-12-28

## 2020-12-31 ENCOUNTER — PATIENT OUTREACH (OUTPATIENT)
Dept: OTHER | Facility: OTHER | Age: 60
End: 2020-12-31

## 2021-01-06 ENCOUNTER — PATIENT OUTREACH (OUTPATIENT)
Dept: OTHER | Facility: OTHER | Age: 61
End: 2021-01-06

## 2021-02-20 ENCOUNTER — PATIENT MESSAGE (OUTPATIENT)
Dept: ADMINISTRATIVE | Facility: OTHER | Age: 61
End: 2021-02-20

## 2021-02-23 ENCOUNTER — OFFICE VISIT (OUTPATIENT)
Dept: CARDIOLOGY | Facility: CLINIC | Age: 61
End: 2021-02-23
Payer: COMMERCIAL

## 2021-02-23 VITALS
BODY MASS INDEX: 24.2 KG/M2 | HEIGHT: 73 IN | HEART RATE: 61 BPM | WEIGHT: 182.56 LBS | RESPIRATION RATE: 16 BRPM | DIASTOLIC BLOOD PRESSURE: 84 MMHG | OXYGEN SATURATION: 97 % | SYSTOLIC BLOOD PRESSURE: 130 MMHG

## 2021-02-23 DIAGNOSIS — R93.1 AGATSTON CORONARY ARTERY CALCIUM SCORE GREATER THAN 400: ICD-10-CM

## 2021-02-23 DIAGNOSIS — I20.9 AP (ANGINA PECTORIS): ICD-10-CM

## 2021-02-23 DIAGNOSIS — E78.2 MIXED HYPERLIPIDEMIA: Primary | ICD-10-CM

## 2021-02-23 DIAGNOSIS — R07.89 ATYPICAL CHEST PAIN: ICD-10-CM

## 2021-02-23 DIAGNOSIS — Z98.61 HISTORY OF PTCA: ICD-10-CM

## 2021-02-23 DIAGNOSIS — I25.118 CORONARY ARTERY DISEASE OF NATIVE ARTERY OF NATIVE HEART WITH STABLE ANGINA PECTORIS: ICD-10-CM

## 2021-02-23 DIAGNOSIS — I10 ESSENTIAL HYPERTENSION: Chronic | ICD-10-CM

## 2021-02-23 PROCEDURE — 3075F SYST BP GE 130 - 139MM HG: CPT | Mod: CPTII,S$GLB,, | Performed by: INTERNAL MEDICINE

## 2021-02-23 PROCEDURE — 99999 PR PBB SHADOW E&M-EST. PATIENT-LVL III: ICD-10-PCS | Mod: PBBFAC,,, | Performed by: INTERNAL MEDICINE

## 2021-02-23 PROCEDURE — 99214 OFFICE O/P EST MOD 30 MIN: CPT | Mod: S$GLB,,, | Performed by: INTERNAL MEDICINE

## 2021-02-23 PROCEDURE — 3008F PR BODY MASS INDEX (BMI) DOCUMENTED: ICD-10-PCS | Mod: CPTII,S$GLB,, | Performed by: INTERNAL MEDICINE

## 2021-02-23 PROCEDURE — 3075F PR MOST RECENT SYSTOLIC BLOOD PRESS GE 130-139MM HG: ICD-10-PCS | Mod: CPTII,S$GLB,, | Performed by: INTERNAL MEDICINE

## 2021-02-23 PROCEDURE — 99999 PR PBB SHADOW E&M-EST. PATIENT-LVL III: CPT | Mod: PBBFAC,,, | Performed by: INTERNAL MEDICINE

## 2021-02-23 PROCEDURE — 3008F BODY MASS INDEX DOCD: CPT | Mod: CPTII,S$GLB,, | Performed by: INTERNAL MEDICINE

## 2021-02-23 PROCEDURE — 1126F PR PAIN SEVERITY QUANTIFIED, NO PAIN PRESENT: ICD-10-PCS | Mod: S$GLB,,, | Performed by: INTERNAL MEDICINE

## 2021-02-23 PROCEDURE — 1126F AMNT PAIN NOTED NONE PRSNT: CPT | Mod: S$GLB,,, | Performed by: INTERNAL MEDICINE

## 2021-02-23 PROCEDURE — 99214 PR OFFICE/OUTPT VISIT, EST, LEVL IV, 30-39 MIN: ICD-10-PCS | Mod: S$GLB,,, | Performed by: INTERNAL MEDICINE

## 2021-02-23 PROCEDURE — 3079F DIAST BP 80-89 MM HG: CPT | Mod: CPTII,S$GLB,, | Performed by: INTERNAL MEDICINE

## 2021-02-23 PROCEDURE — 3079F PR MOST RECENT DIASTOLIC BLOOD PRESSURE 80-89 MM HG: ICD-10-PCS | Mod: CPTII,S$GLB,, | Performed by: INTERNAL MEDICINE

## 2021-02-23 RX ORDER — AMLODIPINE BESYLATE 2.5 MG/1
2.5 TABLET ORAL DAILY
Qty: 90 TABLET | Refills: 4 | Status: SHIPPED | OUTPATIENT
Start: 2021-02-23 | End: 2021-04-12 | Stop reason: SDUPTHER

## 2021-04-07 ENCOUNTER — PATIENT MESSAGE (OUTPATIENT)
Dept: CARDIOLOGY | Facility: CLINIC | Age: 61
End: 2021-04-07

## 2021-04-07 DIAGNOSIS — I10 ESSENTIAL HYPERTENSION: Chronic | ICD-10-CM

## 2021-04-12 RX ORDER — AMLODIPINE BESYLATE 2.5 MG/1
5 TABLET ORAL DAILY
Qty: 90 TABLET | Refills: 4 | Status: SHIPPED | OUTPATIENT
Start: 2021-04-12 | End: 2021-12-14

## 2021-04-15 ENCOUNTER — PATIENT MESSAGE (OUTPATIENT)
Dept: OTHER | Facility: OTHER | Age: 61
End: 2021-04-15

## 2021-07-20 ENCOUNTER — PATIENT OUTREACH (OUTPATIENT)
Dept: OTHER | Facility: OTHER | Age: 61
End: 2021-07-20

## 2021-08-15 DIAGNOSIS — I25.83 CORONARY ARTERY DISEASE DUE TO LIPID RICH PLAQUE: ICD-10-CM

## 2021-08-15 DIAGNOSIS — I25.10 CORONARY ARTERY DISEASE DUE TO LIPID RICH PLAQUE: ICD-10-CM

## 2021-08-15 RX ORDER — ATORVASTATIN CALCIUM 80 MG/1
TABLET, FILM COATED ORAL
Qty: 90 TABLET | Refills: 0 | Status: SHIPPED | OUTPATIENT
Start: 2021-08-15 | End: 2021-09-30 | Stop reason: SDUPTHER

## 2021-09-13 DIAGNOSIS — Z00.00 ROUTINE GENERAL MEDICAL EXAMINATION AT A HEALTH CARE FACILITY: Primary | ICD-10-CM

## 2021-09-14 ENCOUNTER — LAB VISIT (OUTPATIENT)
Dept: LAB | Facility: HOSPITAL | Age: 61
End: 2021-09-14
Attending: INTERNAL MEDICINE
Payer: COMMERCIAL

## 2021-09-14 ENCOUNTER — HOSPITAL ENCOUNTER (OUTPATIENT)
Dept: CARDIOLOGY | Facility: HOSPITAL | Age: 61
Discharge: HOME | End: 2021-09-14
Attending: INTERNAL MEDICINE
Payer: COMMERCIAL

## 2021-09-14 ENCOUNTER — TELEPHONE (OUTPATIENT)
Dept: CARDIOLOGY | Facility: HOSPITAL | Age: 61
End: 2021-09-14

## 2021-09-14 VITALS
WEIGHT: 182 LBS | SYSTOLIC BLOOD PRESSURE: 128 MMHG | DIASTOLIC BLOOD PRESSURE: 78 MMHG | HEIGHT: 73 IN | BODY MASS INDEX: 24.12 KG/M2

## 2021-09-14 DIAGNOSIS — I25.118 CORONARY ARTERY DISEASE OF NATIVE ARTERY OF NATIVE HEART WITH STABLE ANGINA PECTORIS: ICD-10-CM

## 2021-09-14 DIAGNOSIS — Z98.61 HISTORY OF PTCA: ICD-10-CM

## 2021-09-14 DIAGNOSIS — R93.1 AGATSTON CORONARY ARTERY CALCIUM SCORE GREATER THAN 400: ICD-10-CM

## 2021-09-14 DIAGNOSIS — E78.2 MIXED HYPERLIPIDEMIA: ICD-10-CM

## 2021-09-14 DIAGNOSIS — I10 ESSENTIAL HYPERTENSION: ICD-10-CM

## 2021-09-14 DIAGNOSIS — R07.89 ATYPICAL CHEST PAIN: ICD-10-CM

## 2021-09-14 DIAGNOSIS — I10 ESSENTIAL HYPERTENSION: Chronic | ICD-10-CM

## 2021-09-14 DIAGNOSIS — I20.9 AP (ANGINA PECTORIS): ICD-10-CM

## 2021-09-14 LAB
ALBUMIN SERPL BCP-MCNC: 4.2 G/DL (ref 3.5–5.2)
ALP SERPL-CCNC: 76 U/L (ref 55–135)
ALT SERPL W/O P-5'-P-CCNC: 39 U/L (ref 10–44)
ANION GAP SERPL CALC-SCNC: 11 MMOL/L (ref 8–16)
ASCENDING AORTA: 3.08 CM
AST SERPL-CCNC: 25 U/L (ref 10–40)
AV INDEX (PROSTH): 0.82
AV MEAN GRADIENT: 3 MMHG
AV PEAK GRADIENT: 5 MMHG
AV VALVE AREA: 2.85 CM2
AV VELOCITY RATIO: 0.78
BILIRUB SERPL-MCNC: 0.8 MG/DL (ref 0.1–1)
BSA FOR ECHO PROCEDURE: 2.06 M2
BUN SERPL-MCNC: 16 MG/DL (ref 6–20)
CALCIUM SERPL-MCNC: 10 MG/DL (ref 8.7–10.5)
CHLORIDE SERPL-SCNC: 104 MMOL/L (ref 95–110)
CHOLEST SERPL-MCNC: 121 MG/DL (ref 120–199)
CHOLEST/HDLC SERPL: 3.4 {RATIO} (ref 2–5)
CO2 SERPL-SCNC: 25 MMOL/L (ref 23–29)
CREAT SERPL-MCNC: 1 MG/DL (ref 0.5–1.4)
CRP SERPL-MCNC: 0.49 MG/L (ref 0–3.19)
CV ECHO LV RWT: 0.49 CM
CV STRESS BASE HR: 63 BPM
DIASTOLIC BLOOD PRESSURE: 78 MMHG
DOP CALC AO PEAK VEL: 1.14 M/S
DOP CALC AO VTI: 27.56 CM
DOP CALC LVOT AREA: 3.5 CM2
DOP CALC LVOT DIAMETER: 2.1 CM
DOP CALC LVOT PEAK VEL: 0.89 M/S
DOP CALC LVOT STROKE VOLUME: 78.58 CM3
DOP CALC RVOT PEAK VEL: 0.58 M/S
DOP CALC RVOT VTI: 15.03 CM
DOP CALCLVOT PEAK VEL VTI: 22.7 CM
E WAVE DECELERATION TIME: 217.52 MSEC
E/A RATIO: 1.52
E/E' RATIO: 6.31 M/S
ECHO LV POSTERIOR WALL: 1.06 CM (ref 0.6–1.1)
EJECTION FRACTION: 65 %
EST. GFR  (AFRICAN AMERICAN): >60 ML/MIN/1.73 M^2
EST. GFR  (NON AFRICAN AMERICAN): >60 ML/MIN/1.73 M^2
FRACTIONAL SHORTENING: 38 % (ref 28–44)
GLUCOSE SERPL-MCNC: 93 MG/DL (ref 70–110)
HDLC SERPL-MCNC: 36 MG/DL (ref 40–75)
HDLC SERPL: 29.8 % (ref 20–50)
INTERVENTRICULAR SEPTUM: 1.05 CM (ref 0.6–1.1)
IVRT: 91.34 MSEC
LA MAJOR: 5.01 CM
LA MINOR: 4.58 CM
LA WIDTH: 2.38 CM
LDLC SERPL CALC-MCNC: 61.6 MG/DL (ref 63–159)
LEFT ATRIUM SIZE: 3.53 CM
LEFT ATRIUM VOLUME INDEX: 16.5 ML/M2
LEFT ATRIUM VOLUME: 34.17 CM3
LEFT INTERNAL DIMENSION IN SYSTOLE: 2.64 CM (ref 2.1–4)
LEFT VENTRICLE DIASTOLIC VOLUME INDEX: 39.96 ML/M2
LEFT VENTRICLE DIASTOLIC VOLUME: 82.72 ML
LEFT VENTRICLE MASS INDEX: 74 G/M2
LEFT VENTRICLE SYSTOLIC VOLUME INDEX: 12.3 ML/M2
LEFT VENTRICLE SYSTOLIC VOLUME: 25.53 ML
LEFT VENTRICULAR INTERNAL DIMENSION IN DIASTOLE: 4.29 CM (ref 3.5–6)
LEFT VENTRICULAR MASS: 153.01 G
LV LATERAL E/E' RATIO: 5.13 M/S
LV SEPTAL E/E' RATIO: 8.2 M/S
MV A" WAVE DURATION": 17.98 MSEC
MV PEAK A VEL: 0.54 M/S
MV PEAK E VEL: 0.82 M/S
MV STENOSIS PRESSURE HALF TIME: 63.08 MS
MV VALVE AREA P 1/2 METHOD: 3.49 CM2
NONHDLC SERPL-MCNC: 85 MG/DL
OHS CV CPX 1 MINUTE RECOVERY HEART RATE: 103 BPM
OHS CV CPX 85 PERCENT MAX PREDICTED HEART RATE MALE: 137
OHS CV CPX ESTIMATED METS: 13
OHS CV CPX MAX PREDICTED HEART RATE: 161
OHS CV CPX PATIENT IS FEMALE: 0
OHS CV CPX PATIENT IS MALE: 1
OHS CV CPX PEAK DIASTOLIC BLOOD PRESSURE: 78 MMHG
OHS CV CPX PEAK HEAR RATE: 133 BPM
OHS CV CPX PEAK RATE PRESSURE PRODUCT: NORMAL
OHS CV CPX PEAK SYSTOLIC BLOOD PRESSURE: 189 MMHG
OHS CV CPX PERCENT MAX PREDICTED HEART RATE ACHIEVED: 83
OHS CV CPX RATE PRESSURE PRODUCT PRESENTING: 8064
POTASSIUM SERPL-SCNC: 4.8 MMOL/L (ref 3.5–5.1)
PROT SERPL-MCNC: 7.2 G/DL (ref 6–8.4)
PULM VEIN S/D RATIO: 1.29
PV MEAN GRADIENT: 1 MMHG
PV PEAK D VEL: 0.35 M/S
PV PEAK S VEL: 0.45 M/S
PV PEAK VELOCITY: 0.92 CM/S
RA MAJOR: 3.98 CM
RA PRESSURE: 3 MMHG
RA WIDTH: 3.23 CM
RIGHT VENTRICULAR END-DIASTOLIC DIMENSION: 3.16 CM
SINUS: 3.55 CM
SODIUM SERPL-SCNC: 140 MMOL/L (ref 136–145)
STJ: 3.31 CM
STRESS ANGINA INDEX: 0
STRESS ECHO POST EXERCISE DUR MIN: 10 MINUTES
STRESS ECHO POST EXERCISE DUR SEC: 56 SECONDS
SYSTOLIC BLOOD PRESSURE: 128 MMHG
TDI LATERAL: 0.16 M/S
TDI SEPTAL: 0.1 M/S
TDI: 0.13 M/S
TRICUSPID ANNULAR PLANE SYSTOLIC EXCURSION: 2.56 CM
TRIGL SERPL-MCNC: 117 MG/DL (ref 30–150)

## 2021-09-14 PROCEDURE — 93351 STRESS ECHO (CUPID ONLY): ICD-10-PCS | Mod: 26,,, | Performed by: INTERNAL MEDICINE

## 2021-09-14 PROCEDURE — 36415 COLL VENOUS BLD VENIPUNCTURE: CPT | Performed by: INTERNAL MEDICINE

## 2021-09-14 PROCEDURE — 93351 STRESS TTE COMPLETE: CPT | Mod: 26,,, | Performed by: INTERNAL MEDICINE

## 2021-09-14 PROCEDURE — 80061 LIPID PANEL: CPT | Performed by: INTERNAL MEDICINE

## 2021-09-14 PROCEDURE — 93351 STRESS TTE COMPLETE: CPT

## 2021-09-14 PROCEDURE — 80053 COMPREHEN METABOLIC PANEL: CPT | Performed by: INTERNAL MEDICINE

## 2021-09-14 PROCEDURE — 93320 STRESS ECHO (CUPID ONLY): ICD-10-PCS | Mod: 26,,, | Performed by: INTERNAL MEDICINE

## 2021-09-14 PROCEDURE — 93325 STRESS ECHO (CUPID ONLY): ICD-10-PCS | Mod: 26,,, | Performed by: INTERNAL MEDICINE

## 2021-09-14 PROCEDURE — 93325 DOPPLER ECHO COLOR FLOW MAPG: CPT | Mod: 26,,, | Performed by: INTERNAL MEDICINE

## 2021-09-14 PROCEDURE — 93320 DOPPLER ECHO COMPLETE: CPT | Mod: 26,,, | Performed by: INTERNAL MEDICINE

## 2021-09-14 PROCEDURE — 86141 C-REACTIVE PROTEIN HS: CPT | Performed by: INTERNAL MEDICINE

## 2021-09-15 ENCOUNTER — PATIENT MESSAGE (OUTPATIENT)
Dept: CARDIOLOGY | Facility: CLINIC | Age: 61
End: 2021-09-15

## 2021-09-15 DIAGNOSIS — J98.01 BRONCHOSPASM: ICD-10-CM

## 2021-09-15 RX ORDER — ALBUTEROL SULFATE 90 UG/1
2 AEROSOL, METERED RESPIRATORY (INHALATION) EVERY 6 HOURS PRN
Qty: 18 G | Refills: 0 | Status: SHIPPED | OUTPATIENT
Start: 2021-09-15 | End: 2024-02-27 | Stop reason: SDUPTHER

## 2021-09-30 ENCOUNTER — OFFICE VISIT (OUTPATIENT)
Dept: INTERNAL MEDICINE | Facility: CLINIC | Age: 61
End: 2021-09-30
Payer: COMMERCIAL

## 2021-09-30 ENCOUNTER — CLINICAL SUPPORT (OUTPATIENT)
Dept: INTERNAL MEDICINE | Facility: CLINIC | Age: 61
End: 2021-09-30
Payer: COMMERCIAL

## 2021-09-30 ENCOUNTER — LAB VISIT (OUTPATIENT)
Dept: LAB | Facility: HOSPITAL | Age: 61
End: 2021-09-30
Attending: INTERNAL MEDICINE
Payer: COMMERCIAL

## 2021-09-30 VITALS
BODY MASS INDEX: 24.54 KG/M2 | SYSTOLIC BLOOD PRESSURE: 124 MMHG | HEART RATE: 61 BPM | TEMPERATURE: 96 F | OXYGEN SATURATION: 100 % | WEIGHT: 185.19 LBS | DIASTOLIC BLOOD PRESSURE: 70 MMHG | HEIGHT: 73 IN

## 2021-09-30 DIAGNOSIS — I10 ESSENTIAL HYPERTENSION: Chronic | ICD-10-CM

## 2021-09-30 DIAGNOSIS — F41.9 ANXIETY: ICD-10-CM

## 2021-09-30 DIAGNOSIS — Z00.00 ROUTINE GENERAL MEDICAL EXAMINATION AT A HEALTH CARE FACILITY: Primary | ICD-10-CM

## 2021-09-30 DIAGNOSIS — I25.83 CORONARY ARTERY DISEASE DUE TO LIPID RICH PLAQUE: ICD-10-CM

## 2021-09-30 DIAGNOSIS — Z23 NEED FOR INFLUENZA VACCINATION: ICD-10-CM

## 2021-09-30 DIAGNOSIS — I25.10 CORONARY ARTERY DISEASE DUE TO LIPID RICH PLAQUE: ICD-10-CM

## 2021-09-30 DIAGNOSIS — Z23 NEED FOR DIPHTHERIA-TETANUS-PERTUSSIS (TDAP) VACCINE: ICD-10-CM

## 2021-09-30 DIAGNOSIS — R79.89 LOW TESTOSTERONE: ICD-10-CM

## 2021-09-30 DIAGNOSIS — L72.0 EPIDERMAL CYST: ICD-10-CM

## 2021-09-30 DIAGNOSIS — N52.8 OTHER MALE ERECTILE DYSFUNCTION: ICD-10-CM

## 2021-09-30 LAB
25(OH)D3+25(OH)D2 SERPL-MCNC: 44 NG/ML (ref 30–96)
ALBUMIN SERPL BCP-MCNC: 4.2 G/DL (ref 3.5–5.2)
ALP SERPL-CCNC: 84 U/L (ref 55–135)
ALT SERPL W/O P-5'-P-CCNC: 34 U/L (ref 10–44)
ANION GAP SERPL CALC-SCNC: 8 MMOL/L (ref 8–16)
AST SERPL-CCNC: 23 U/L (ref 10–40)
BILIRUB SERPL-MCNC: 0.7 MG/DL (ref 0.1–1)
BUN SERPL-MCNC: 17 MG/DL (ref 6–20)
CALCIUM SERPL-MCNC: 9.3 MG/DL (ref 8.7–10.5)
CHLORIDE SERPL-SCNC: 105 MMOL/L (ref 95–110)
CHOLEST SERPL-MCNC: 108 MG/DL (ref 120–199)
CHOLEST/HDLC SERPL: 3 {RATIO} (ref 2–5)
CO2 SERPL-SCNC: 29 MMOL/L (ref 23–29)
COMPLEXED PSA SERPL-MCNC: 1.2 NG/ML (ref 0–4)
CREAT SERPL-MCNC: 0.9 MG/DL (ref 0.5–1.4)
ERYTHROCYTE [DISTWIDTH] IN BLOOD BY AUTOMATED COUNT: 11.5 % (ref 11.5–14.5)
EST. GFR  (AFRICAN AMERICAN): >60 ML/MIN/1.73 M^2
EST. GFR  (NON AFRICAN AMERICAN): >60 ML/MIN/1.73 M^2
ESTIMATED AVG GLUCOSE: 100 MG/DL (ref 68–131)
GLUCOSE SERPL-MCNC: 105 MG/DL (ref 70–110)
HBA1C MFR BLD: 5.1 % (ref 4–5.6)
HCT VFR BLD AUTO: 40.9 % (ref 40–54)
HDLC SERPL-MCNC: 36 MG/DL (ref 40–75)
HDLC SERPL: 33.3 % (ref 20–50)
HGB BLD-MCNC: 14 G/DL (ref 14–18)
LDLC SERPL CALC-MCNC: 58.6 MG/DL (ref 63–159)
MCH RBC QN AUTO: 32.9 PG (ref 27–31)
MCHC RBC AUTO-ENTMCNC: 34.2 G/DL (ref 32–36)
MCV RBC AUTO: 96 FL (ref 82–98)
NONHDLC SERPL-MCNC: 72 MG/DL
PLATELET # BLD AUTO: 236 K/UL (ref 150–450)
PMV BLD AUTO: 9.6 FL (ref 9.2–12.9)
POTASSIUM SERPL-SCNC: 4.9 MMOL/L (ref 3.5–5.1)
PROT SERPL-MCNC: 7.2 G/DL (ref 6–8.4)
RBC # BLD AUTO: 4.26 M/UL (ref 4.6–6.2)
SODIUM SERPL-SCNC: 142 MMOL/L (ref 136–145)
TESTOST SERPL-MCNC: 147 NG/DL (ref 304–1227)
TRIGL SERPL-MCNC: 67 MG/DL (ref 30–150)
TSH SERPL DL<=0.005 MIU/L-ACNC: 1.25 UIU/ML (ref 0.4–4)
WBC # BLD AUTO: 4.57 K/UL (ref 3.9–12.7)

## 2021-09-30 PROCEDURE — 3074F SYST BP LT 130 MM HG: CPT | Mod: CPTII,S$GLB,, | Performed by: INTERNAL MEDICINE

## 2021-09-30 PROCEDURE — 90471 IMMUNIZATION ADMIN: CPT | Mod: S$GLB,,, | Performed by: INTERNAL MEDICINE

## 2021-09-30 PROCEDURE — 90472 TDAP VACCINE GREATER THAN OR EQUAL TO 7YO IM: ICD-10-PCS | Mod: S$GLB,,, | Performed by: INTERNAL MEDICINE

## 2021-09-30 PROCEDURE — 36415 COLL VENOUS BLD VENIPUNCTURE: CPT | Performed by: INTERNAL MEDICINE

## 2021-09-30 PROCEDURE — 3078F PR MOST RECENT DIASTOLIC BLOOD PRESSURE < 80 MM HG: ICD-10-PCS | Mod: CPTII,S$GLB,, | Performed by: INTERNAL MEDICINE

## 2021-09-30 PROCEDURE — 90472 IMMUNIZATION ADMIN EACH ADD: CPT | Mod: S$GLB,,, | Performed by: INTERNAL MEDICINE

## 2021-09-30 PROCEDURE — 99396 PREV VISIT EST AGE 40-64: CPT | Mod: 25,S$GLB,, | Performed by: INTERNAL MEDICINE

## 2021-09-30 PROCEDURE — 84403 ASSAY OF TOTAL TESTOSTERONE: CPT | Performed by: INTERNAL MEDICINE

## 2021-09-30 PROCEDURE — 3044F HG A1C LEVEL LT 7.0%: CPT | Mod: CPTII,S$GLB,, | Performed by: INTERNAL MEDICINE

## 2021-09-30 PROCEDURE — 90686 FLU VACCINE (QUAD) GREATER THAN OR EQUAL TO 3YO PRESERVATIVE FREE IM: ICD-10-PCS | Mod: S$GLB,,, | Performed by: INTERNAL MEDICINE

## 2021-09-30 PROCEDURE — 84403 ASSAY OF TOTAL TESTOSTERONE: CPT | Mod: 91 | Performed by: INTERNAL MEDICINE

## 2021-09-30 PROCEDURE — 85027 COMPLETE CBC AUTOMATED: CPT | Performed by: INTERNAL MEDICINE

## 2021-09-30 PROCEDURE — 80061 LIPID PANEL: CPT | Performed by: INTERNAL MEDICINE

## 2021-09-30 PROCEDURE — 90686 IIV4 VACC NO PRSV 0.5 ML IM: CPT | Mod: S$GLB,,, | Performed by: INTERNAL MEDICINE

## 2021-09-30 PROCEDURE — 99396 PR PREVENTIVE VISIT,EST,40-64: ICD-10-PCS | Mod: 25,S$GLB,, | Performed by: INTERNAL MEDICINE

## 2021-09-30 PROCEDURE — 99999 PR PBB SHADOW E&M-EST. PATIENT-LVL IV: ICD-10-PCS | Mod: PBBFAC,,, | Performed by: INTERNAL MEDICINE

## 2021-09-30 PROCEDURE — 1159F PR MEDICATION LIST DOCUMENTED IN MEDICAL RECORD: ICD-10-PCS | Mod: CPTII,S$GLB,, | Performed by: INTERNAL MEDICINE

## 2021-09-30 PROCEDURE — 83036 HEMOGLOBIN GLYCOSYLATED A1C: CPT | Performed by: INTERNAL MEDICINE

## 2021-09-30 PROCEDURE — 1159F MED LIST DOCD IN RCRD: CPT | Mod: CPTII,S$GLB,, | Performed by: INTERNAL MEDICINE

## 2021-09-30 PROCEDURE — 84443 ASSAY THYROID STIM HORMONE: CPT | Performed by: INTERNAL MEDICINE

## 2021-09-30 PROCEDURE — 3078F DIAST BP <80 MM HG: CPT | Mod: CPTII,S$GLB,, | Performed by: INTERNAL MEDICINE

## 2021-09-30 PROCEDURE — 3044F PR MOST RECENT HEMOGLOBIN A1C LEVEL <7.0%: ICD-10-PCS | Mod: CPTII,S$GLB,, | Performed by: INTERNAL MEDICINE

## 2021-09-30 PROCEDURE — 82306 VITAMIN D 25 HYDROXY: CPT | Performed by: INTERNAL MEDICINE

## 2021-09-30 PROCEDURE — 90715 TDAP VACCINE 7 YRS/> IM: CPT | Mod: S$GLB,,, | Performed by: INTERNAL MEDICINE

## 2021-09-30 PROCEDURE — 86803 HEPATITIS C AB TEST: CPT | Performed by: INTERNAL MEDICINE

## 2021-09-30 PROCEDURE — 3008F BODY MASS INDEX DOCD: CPT | Mod: CPTII,S$GLB,, | Performed by: INTERNAL MEDICINE

## 2021-09-30 PROCEDURE — 90715 TDAP VACCINE GREATER THAN OR EQUAL TO 7YO IM: ICD-10-PCS | Mod: S$GLB,,, | Performed by: INTERNAL MEDICINE

## 2021-09-30 PROCEDURE — 3074F PR MOST RECENT SYSTOLIC BLOOD PRESSURE < 130 MM HG: ICD-10-PCS | Mod: CPTII,S$GLB,, | Performed by: INTERNAL MEDICINE

## 2021-09-30 PROCEDURE — 80053 COMPREHEN METABOLIC PANEL: CPT | Performed by: INTERNAL MEDICINE

## 2021-09-30 PROCEDURE — 84153 ASSAY OF PSA TOTAL: CPT | Performed by: INTERNAL MEDICINE

## 2021-09-30 PROCEDURE — 90471 FLU VACCINE (QUAD) GREATER THAN OR EQUAL TO 3YO PRESERVATIVE FREE IM: ICD-10-PCS | Mod: S$GLB,,, | Performed by: INTERNAL MEDICINE

## 2021-09-30 PROCEDURE — 3008F PR BODY MASS INDEX (BMI) DOCUMENTED: ICD-10-PCS | Mod: CPTII,S$GLB,, | Performed by: INTERNAL MEDICINE

## 2021-09-30 PROCEDURE — 99999 PR PBB SHADOW E&M-EST. PATIENT-LVL IV: CPT | Mod: PBBFAC,,, | Performed by: INTERNAL MEDICINE

## 2021-09-30 RX ORDER — METOPROLOL SUCCINATE 50 MG/1
50 TABLET, EXTENDED RELEASE ORAL DAILY
Qty: 90 TABLET | Refills: 3 | Status: SHIPPED | OUTPATIENT
Start: 2021-09-30 | End: 2022-09-20 | Stop reason: SDUPTHER

## 2021-09-30 RX ORDER — CLONAZEPAM 0.5 MG/1
0.5 TABLET ORAL 2 TIMES DAILY PRN
Qty: 30 TABLET | Refills: 0 | Status: SHIPPED | OUTPATIENT
Start: 2021-09-30 | End: 2024-02-27 | Stop reason: SDUPTHER

## 2021-09-30 RX ORDER — SILDENAFIL 100 MG/1
TABLET, FILM COATED ORAL
Qty: 10 TABLET | Refills: 6 | Status: SHIPPED | OUTPATIENT
Start: 2021-09-30 | End: 2024-02-27 | Stop reason: SDUPTHER

## 2021-09-30 RX ORDER — ATORVASTATIN CALCIUM 80 MG/1
80 TABLET, FILM COATED ORAL NIGHTLY
Qty: 90 TABLET | Refills: 3 | Status: SHIPPED | OUTPATIENT
Start: 2021-09-30 | End: 2022-09-20 | Stop reason: SDUPTHER

## 2021-10-01 LAB — HCV AB SERPL QL IA: POSITIVE

## 2021-10-05 ENCOUNTER — PATIENT OUTREACH (OUTPATIENT)
Dept: ADMINISTRATIVE | Facility: OTHER | Age: 61
End: 2021-10-05

## 2021-10-06 ENCOUNTER — OFFICE VISIT (OUTPATIENT)
Dept: DERMATOLOGY | Facility: CLINIC | Age: 61
End: 2021-10-06
Payer: COMMERCIAL

## 2021-10-06 DIAGNOSIS — R22.9 SUBCUTANEOUS NODULE: ICD-10-CM

## 2021-10-06 PROCEDURE — 99499 NO LOS: ICD-10-PCS | Mod: S$GLB,,, | Performed by: STUDENT IN AN ORGANIZED HEALTH CARE EDUCATION/TRAINING PROGRAM

## 2021-10-06 PROCEDURE — 99999 PR PBB SHADOW E&M-EST. PATIENT-LVL III: ICD-10-PCS | Mod: PBBFAC,,, | Performed by: STUDENT IN AN ORGANIZED HEALTH CARE EDUCATION/TRAINING PROGRAM

## 2021-10-06 PROCEDURE — 3044F PR MOST RECENT HEMOGLOBIN A1C LEVEL <7.0%: ICD-10-PCS | Mod: CPTII,S$GLB,, | Performed by: STUDENT IN AN ORGANIZED HEALTH CARE EDUCATION/TRAINING PROGRAM

## 2021-10-06 PROCEDURE — 1160F PR REVIEW ALL MEDS BY PRESCRIBER/CLIN PHARMACIST DOCUMENTED: ICD-10-PCS | Mod: CPTII,S$GLB,, | Performed by: STUDENT IN AN ORGANIZED HEALTH CARE EDUCATION/TRAINING PROGRAM

## 2021-10-06 PROCEDURE — 99999 PR PBB SHADOW E&M-EST. PATIENT-LVL III: CPT | Mod: PBBFAC,,, | Performed by: STUDENT IN AN ORGANIZED HEALTH CARE EDUCATION/TRAINING PROGRAM

## 2021-10-06 PROCEDURE — 1159F PR MEDICATION LIST DOCUMENTED IN MEDICAL RECORD: ICD-10-PCS | Mod: CPTII,S$GLB,, | Performed by: STUDENT IN AN ORGANIZED HEALTH CARE EDUCATION/TRAINING PROGRAM

## 2021-10-06 PROCEDURE — 3044F HG A1C LEVEL LT 7.0%: CPT | Mod: CPTII,S$GLB,, | Performed by: STUDENT IN AN ORGANIZED HEALTH CARE EDUCATION/TRAINING PROGRAM

## 2021-10-06 PROCEDURE — 1159F MED LIST DOCD IN RCRD: CPT | Mod: CPTII,S$GLB,, | Performed by: STUDENT IN AN ORGANIZED HEALTH CARE EDUCATION/TRAINING PROGRAM

## 2021-10-06 PROCEDURE — 88304 PR  SURG PATH,LEVEL III: ICD-10-PCS | Mod: 26,,, | Performed by: PATHOLOGY

## 2021-10-06 PROCEDURE — 88304 TISSUE EXAM BY PATHOLOGIST: CPT | Performed by: PATHOLOGY

## 2021-10-06 PROCEDURE — 99499 UNLISTED E&M SERVICE: CPT | Mod: S$GLB,,, | Performed by: STUDENT IN AN ORGANIZED HEALTH CARE EDUCATION/TRAINING PROGRAM

## 2021-10-06 PROCEDURE — 11104 PR PUNCH BIOPSY, SKIN, SINGLE LESION: ICD-10-PCS | Mod: S$GLB,,, | Performed by: STUDENT IN AN ORGANIZED HEALTH CARE EDUCATION/TRAINING PROGRAM

## 2021-10-06 PROCEDURE — 11104 PUNCH BX SKIN SINGLE LESION: CPT | Mod: S$GLB,,, | Performed by: STUDENT IN AN ORGANIZED HEALTH CARE EDUCATION/TRAINING PROGRAM

## 2021-10-06 PROCEDURE — 1160F RVW MEDS BY RX/DR IN RCRD: CPT | Mod: CPTII,S$GLB,, | Performed by: STUDENT IN AN ORGANIZED HEALTH CARE EDUCATION/TRAINING PROGRAM

## 2021-10-06 PROCEDURE — 88304 TISSUE EXAM BY PATHOLOGIST: CPT | Mod: 26,,, | Performed by: PATHOLOGY

## 2021-10-07 ENCOUNTER — PATIENT MESSAGE (OUTPATIENT)
Dept: UROLOGY | Facility: CLINIC | Age: 61
End: 2021-10-07

## 2021-10-07 ENCOUNTER — TELEPHONE (OUTPATIENT)
Dept: UROLOGY | Facility: CLINIC | Age: 61
End: 2021-10-07

## 2021-10-07 LAB
ALBUMIN SERPL-MCNC: 4.7 G/DL (ref 3.6–5.1)
SHBG SERPL-SCNC: 23 NMOL/L (ref 22–77)
TESTOST FREE SERPL-MCNC: 48 PG/ML (ref 46–224)
TESTOST SERPL-MCNC: 292 NG/DL (ref 250–1100)
TESTOSTERONE.FREE+WB SERPL-MCNC: 102.8 NG/DL (ref 110–575)

## 2021-10-08 ENCOUNTER — PATIENT MESSAGE (OUTPATIENT)
Dept: INTERNAL MEDICINE | Facility: CLINIC | Age: 61
End: 2021-10-08

## 2021-10-11 LAB
FINAL PATHOLOGIC DIAGNOSIS: NORMAL
GROSS: NORMAL
Lab: NORMAL
MICROSCOPIC EXAM: NORMAL

## 2021-10-13 ENCOUNTER — TELEPHONE (OUTPATIENT)
Dept: CARDIOLOGY | Facility: CLINIC | Age: 61
End: 2021-10-13

## 2021-10-13 DIAGNOSIS — I10 HYPERTENSION, UNSPECIFIED TYPE: Primary | ICD-10-CM

## 2021-10-19 ENCOUNTER — OFFICE VISIT (OUTPATIENT)
Dept: CARDIOLOGY | Facility: CLINIC | Age: 61
End: 2021-10-19
Payer: COMMERCIAL

## 2021-10-19 ENCOUNTER — HOSPITAL ENCOUNTER (OUTPATIENT)
Dept: CARDIOLOGY | Facility: HOSPITAL | Age: 61
Discharge: HOME | End: 2021-10-19
Attending: INTERNAL MEDICINE
Payer: COMMERCIAL

## 2021-10-19 VITALS
OXYGEN SATURATION: 98 % | SYSTOLIC BLOOD PRESSURE: 124 MMHG | BODY MASS INDEX: 24.52 KG/M2 | RESPIRATION RATE: 16 BRPM | WEIGHT: 185 LBS | HEIGHT: 73 IN | DIASTOLIC BLOOD PRESSURE: 80 MMHG | HEART RATE: 60 BPM

## 2021-10-19 DIAGNOSIS — Z98.61 HISTORY OF PTCA: ICD-10-CM

## 2021-10-19 DIAGNOSIS — I10 PRIMARY HYPERTENSION: Chronic | ICD-10-CM

## 2021-10-19 DIAGNOSIS — I10 HYPERTENSION, UNSPECIFIED TYPE: ICD-10-CM

## 2021-10-19 DIAGNOSIS — R94.31 ABNORMAL ECG: ICD-10-CM

## 2021-10-19 DIAGNOSIS — E78.2 MIXED HYPERLIPIDEMIA: ICD-10-CM

## 2021-10-19 DIAGNOSIS — R07.89 ATYPICAL CHEST PAIN: Primary | ICD-10-CM

## 2021-10-19 DIAGNOSIS — I25.118 CORONARY ARTERY DISEASE OF NATIVE ARTERY OF NATIVE HEART WITH STABLE ANGINA PECTORIS: ICD-10-CM

## 2021-10-19 DIAGNOSIS — I20.9 AP (ANGINA PECTORIS): ICD-10-CM

## 2021-10-19 DIAGNOSIS — R93.1 AGATSTON CORONARY ARTERY CALCIUM SCORE GREATER THAN 400: ICD-10-CM

## 2021-10-19 PROCEDURE — 3079F DIAST BP 80-89 MM HG: CPT | Mod: CPTII,S$GLB,, | Performed by: INTERNAL MEDICINE

## 2021-10-19 PROCEDURE — 99214 PR OFFICE/OUTPT VISIT, EST, LEVL IV, 30-39 MIN: ICD-10-PCS | Mod: S$GLB,,, | Performed by: INTERNAL MEDICINE

## 2021-10-19 PROCEDURE — 93005 ELECTROCARDIOGRAM TRACING: CPT

## 2021-10-19 PROCEDURE — 99999 PR PBB SHADOW E&M-EST. PATIENT-LVL III: CPT | Mod: PBBFAC,,, | Performed by: INTERNAL MEDICINE

## 2021-10-19 PROCEDURE — 3044F PR MOST RECENT HEMOGLOBIN A1C LEVEL <7.0%: ICD-10-PCS | Mod: CPTII,S$GLB,, | Performed by: INTERNAL MEDICINE

## 2021-10-19 PROCEDURE — 93010 EKG 12-LEAD: ICD-10-PCS | Mod: ,,, | Performed by: STUDENT IN AN ORGANIZED HEALTH CARE EDUCATION/TRAINING PROGRAM

## 2021-10-19 PROCEDURE — 1159F PR MEDICATION LIST DOCUMENTED IN MEDICAL RECORD: ICD-10-PCS | Mod: CPTII,S$GLB,, | Performed by: INTERNAL MEDICINE

## 2021-10-19 PROCEDURE — 3074F SYST BP LT 130 MM HG: CPT | Mod: CPTII,S$GLB,, | Performed by: INTERNAL MEDICINE

## 2021-10-19 PROCEDURE — 1160F PR REVIEW ALL MEDS BY PRESCRIBER/CLIN PHARMACIST DOCUMENTED: ICD-10-PCS | Mod: CPTII,S$GLB,, | Performed by: INTERNAL MEDICINE

## 2021-10-19 PROCEDURE — 3074F PR MOST RECENT SYSTOLIC BLOOD PRESSURE < 130 MM HG: ICD-10-PCS | Mod: CPTII,S$GLB,, | Performed by: INTERNAL MEDICINE

## 2021-10-19 PROCEDURE — 3008F PR BODY MASS INDEX (BMI) DOCUMENTED: ICD-10-PCS | Mod: CPTII,S$GLB,, | Performed by: INTERNAL MEDICINE

## 2021-10-19 PROCEDURE — 99214 OFFICE O/P EST MOD 30 MIN: CPT | Mod: S$GLB,,, | Performed by: INTERNAL MEDICINE

## 2021-10-19 PROCEDURE — 3044F HG A1C LEVEL LT 7.0%: CPT | Mod: CPTII,S$GLB,, | Performed by: INTERNAL MEDICINE

## 2021-10-19 PROCEDURE — 1160F RVW MEDS BY RX/DR IN RCRD: CPT | Mod: CPTII,S$GLB,, | Performed by: INTERNAL MEDICINE

## 2021-10-19 PROCEDURE — 1159F MED LIST DOCD IN RCRD: CPT | Mod: CPTII,S$GLB,, | Performed by: INTERNAL MEDICINE

## 2021-10-19 PROCEDURE — 99999 PR PBB SHADOW E&M-EST. PATIENT-LVL III: ICD-10-PCS | Mod: PBBFAC,,, | Performed by: INTERNAL MEDICINE

## 2021-10-19 PROCEDURE — 93010 ELECTROCARDIOGRAM REPORT: CPT | Mod: ,,, | Performed by: STUDENT IN AN ORGANIZED HEALTH CARE EDUCATION/TRAINING PROGRAM

## 2021-10-19 PROCEDURE — 3079F PR MOST RECENT DIASTOLIC BLOOD PRESSURE 80-89 MM HG: ICD-10-PCS | Mod: CPTII,S$GLB,, | Performed by: INTERNAL MEDICINE

## 2021-10-19 PROCEDURE — 3008F BODY MASS INDEX DOCD: CPT | Mod: CPTII,S$GLB,, | Performed by: INTERNAL MEDICINE

## 2021-10-20 ENCOUNTER — PATIENT MESSAGE (OUTPATIENT)
Dept: CARDIOLOGY | Facility: CLINIC | Age: 61
End: 2021-10-20
Payer: COMMERCIAL

## 2021-10-21 ENCOUNTER — OFFICE VISIT (OUTPATIENT)
Dept: UROLOGY | Facility: CLINIC | Age: 61
End: 2021-10-21
Payer: COMMERCIAL

## 2021-10-21 VITALS
WEIGHT: 186.31 LBS | DIASTOLIC BLOOD PRESSURE: 70 MMHG | SYSTOLIC BLOOD PRESSURE: 110 MMHG | BODY MASS INDEX: 24.58 KG/M2

## 2021-10-21 DIAGNOSIS — E29.1 HYPOGONADISM IN MALE: Primary | ICD-10-CM

## 2021-10-21 DIAGNOSIS — I25.118 CORONARY ARTERY DISEASE OF NATIVE ARTERY OF NATIVE HEART WITH STABLE ANGINA PECTORIS: ICD-10-CM

## 2021-10-21 PROCEDURE — 1159F MED LIST DOCD IN RCRD: CPT | Mod: CPTII,S$GLB,, | Performed by: UROLOGY

## 2021-10-21 PROCEDURE — 3044F PR MOST RECENT HEMOGLOBIN A1C LEVEL <7.0%: ICD-10-PCS | Mod: CPTII,S$GLB,, | Performed by: UROLOGY

## 2021-10-21 PROCEDURE — 1160F PR REVIEW ALL MEDS BY PRESCRIBER/CLIN PHARMACIST DOCUMENTED: ICD-10-PCS | Mod: CPTII,S$GLB,, | Performed by: UROLOGY

## 2021-10-21 PROCEDURE — 1160F RVW MEDS BY RX/DR IN RCRD: CPT | Mod: CPTII,S$GLB,, | Performed by: UROLOGY

## 2021-10-21 PROCEDURE — 99204 PR OFFICE/OUTPT VISIT, NEW, LEVL IV, 45-59 MIN: ICD-10-PCS | Mod: S$GLB,,, | Performed by: UROLOGY

## 2021-10-21 PROCEDURE — 3008F BODY MASS INDEX DOCD: CPT | Mod: CPTII,S$GLB,, | Performed by: UROLOGY

## 2021-10-21 PROCEDURE — 1159F PR MEDICATION LIST DOCUMENTED IN MEDICAL RECORD: ICD-10-PCS | Mod: CPTII,S$GLB,, | Performed by: UROLOGY

## 2021-10-21 PROCEDURE — 99999 PR PBB SHADOW E&M-EST. PATIENT-LVL III: CPT | Mod: PBBFAC,,, | Performed by: UROLOGY

## 2021-10-21 PROCEDURE — 3008F PR BODY MASS INDEX (BMI) DOCUMENTED: ICD-10-PCS | Mod: CPTII,S$GLB,, | Performed by: UROLOGY

## 2021-10-21 PROCEDURE — 3074F PR MOST RECENT SYSTOLIC BLOOD PRESSURE < 130 MM HG: ICD-10-PCS | Mod: CPTII,S$GLB,, | Performed by: UROLOGY

## 2021-10-21 PROCEDURE — 99204 OFFICE O/P NEW MOD 45 MIN: CPT | Mod: S$GLB,,, | Performed by: UROLOGY

## 2021-10-21 PROCEDURE — 3074F SYST BP LT 130 MM HG: CPT | Mod: CPTII,S$GLB,, | Performed by: UROLOGY

## 2021-10-21 PROCEDURE — 3078F DIAST BP <80 MM HG: CPT | Mod: CPTII,S$GLB,, | Performed by: UROLOGY

## 2021-10-21 PROCEDURE — 3044F HG A1C LEVEL LT 7.0%: CPT | Mod: CPTII,S$GLB,, | Performed by: UROLOGY

## 2021-10-21 PROCEDURE — 99999 PR PBB SHADOW E&M-EST. PATIENT-LVL III: ICD-10-PCS | Mod: PBBFAC,,, | Performed by: UROLOGY

## 2021-10-21 PROCEDURE — 3078F PR MOST RECENT DIASTOLIC BLOOD PRESSURE < 80 MM HG: ICD-10-PCS | Mod: CPTII,S$GLB,, | Performed by: UROLOGY

## 2021-10-21 RX ORDER — TESTOSTERONE 40.5 MG/2.5G
40.5 GEL TOPICAL DAILY
Qty: 30 PACKET | Refills: 3 | Status: SHIPPED | OUTPATIENT
Start: 2021-10-21 | End: 2021-11-05 | Stop reason: SDUPTHER

## 2021-11-08 RX ORDER — TESTOSTERONE 40.5 MG/2.5G
40.5 GEL TOPICAL DAILY
Qty: 30 PACKET | Refills: 3 | Status: SHIPPED | OUTPATIENT
Start: 2021-11-08 | End: 2021-11-12 | Stop reason: SDUPTHER

## 2021-11-12 ENCOUNTER — PATIENT MESSAGE (OUTPATIENT)
Dept: UROLOGY | Facility: CLINIC | Age: 61
End: 2021-11-12
Payer: COMMERCIAL

## 2021-11-12 RX ORDER — TESTOSTERONE 40.5 MG/2.5G
40.5 GEL TOPICAL DAILY
Qty: 30 PACKET | Refills: 3 | Status: SHIPPED | OUTPATIENT
Start: 2021-11-12 | End: 2022-01-31 | Stop reason: SDUPTHER

## 2021-11-16 ENCOUNTER — PATIENT OUTREACH (OUTPATIENT)
Dept: OTHER | Facility: OTHER | Age: 61
End: 2021-11-16
Payer: COMMERCIAL

## 2021-12-27 ENCOUNTER — PATIENT MESSAGE (OUTPATIENT)
Dept: OTHER | Facility: OTHER | Age: 61
End: 2021-12-27
Payer: COMMERCIAL

## 2022-01-20 ENCOUNTER — LAB VISIT (OUTPATIENT)
Dept: LAB | Facility: HOSPITAL | Age: 62
End: 2022-01-20
Attending: UROLOGY
Payer: COMMERCIAL

## 2022-01-20 DIAGNOSIS — E29.1 HYPOGONADISM IN MALE: ICD-10-CM

## 2022-01-20 LAB
ALBUMIN SERPL BCP-MCNC: 4.5 G/DL (ref 3.5–5.2)
ALP SERPL-CCNC: 80 U/L (ref 55–135)
ALT SERPL W/O P-5'-P-CCNC: 27 U/L (ref 10–44)
ANION GAP SERPL CALC-SCNC: 13 MMOL/L (ref 8–16)
AST SERPL-CCNC: 32 U/L (ref 10–40)
BASOPHILS # BLD AUTO: 0.04 K/UL (ref 0–0.2)
BASOPHILS NFR BLD: 0.7 % (ref 0–1.9)
BILIRUB SERPL-MCNC: 0.7 MG/DL (ref 0.1–1)
BUN SERPL-MCNC: 17 MG/DL (ref 6–20)
CALCIUM SERPL-MCNC: 10.2 MG/DL (ref 8.7–10.5)
CHLORIDE SERPL-SCNC: 101 MMOL/L (ref 95–110)
CHOLEST SERPL-MCNC: 104 MG/DL (ref 120–199)
CHOLEST/HDLC SERPL: 2.8 {RATIO} (ref 2–5)
CO2 SERPL-SCNC: 25 MMOL/L (ref 23–29)
COMPLEXED PSA SERPL-MCNC: 1 NG/ML (ref 0–4)
CREAT SERPL-MCNC: 1.2 MG/DL (ref 0.5–1.4)
DIFFERENTIAL METHOD: ABNORMAL
EOSINOPHIL # BLD AUTO: 0.1 K/UL (ref 0–0.5)
EOSINOPHIL NFR BLD: 1.1 % (ref 0–8)
ERYTHROCYTE [DISTWIDTH] IN BLOOD BY AUTOMATED COUNT: 11.4 % (ref 11.5–14.5)
EST. GFR  (AFRICAN AMERICAN): >60 ML/MIN/1.73 M^2
EST. GFR  (NON AFRICAN AMERICAN): >60 ML/MIN/1.73 M^2
GLUCOSE SERPL-MCNC: 101 MG/DL (ref 70–110)
HCT VFR BLD AUTO: 45 % (ref 40–54)
HDLC SERPL-MCNC: 37 MG/DL (ref 40–75)
HDLC SERPL: 35.6 % (ref 20–50)
HGB BLD-MCNC: 14.4 G/DL (ref 14–18)
IMM GRANULOCYTES # BLD AUTO: 0 K/UL (ref 0–0.04)
IMM GRANULOCYTES NFR BLD AUTO: 0 % (ref 0–0.5)
LDLC SERPL CALC-MCNC: 49.4 MG/DL (ref 63–159)
LYMPHOCYTES # BLD AUTO: 1.7 K/UL (ref 1–4.8)
LYMPHOCYTES NFR BLD: 30.3 % (ref 18–48)
MCH RBC QN AUTO: 32.3 PG (ref 27–31)
MCHC RBC AUTO-ENTMCNC: 32 G/DL (ref 32–36)
MCV RBC AUTO: 101 FL (ref 82–98)
MONOCYTES # BLD AUTO: 0.3 K/UL (ref 0.3–1)
MONOCYTES NFR BLD: 5.6 % (ref 4–15)
NEUTROPHILS # BLD AUTO: 3.5 K/UL (ref 1.8–7.7)
NEUTROPHILS NFR BLD: 62.3 % (ref 38–73)
NONHDLC SERPL-MCNC: 67 MG/DL
NRBC BLD-RTO: 0 /100 WBC
PLATELET # BLD AUTO: 259 K/UL (ref 150–450)
PMV BLD AUTO: 10.5 FL (ref 9.2–12.9)
POTASSIUM SERPL-SCNC: 4.9 MMOL/L (ref 3.5–5.1)
PROT SERPL-MCNC: 8 G/DL (ref 6–8.4)
RBC # BLD AUTO: 4.46 M/UL (ref 4.6–6.2)
SODIUM SERPL-SCNC: 139 MMOL/L (ref 136–145)
TESTOST SERPL-MCNC: 245 NG/DL (ref 304–1227)
TRIGL SERPL-MCNC: 88 MG/DL (ref 30–150)
WBC # BLD AUTO: 5.67 K/UL (ref 3.9–12.7)

## 2022-01-20 PROCEDURE — 84403 ASSAY OF TOTAL TESTOSTERONE: CPT | Performed by: UROLOGY

## 2022-01-20 PROCEDURE — 36415 COLL VENOUS BLD VENIPUNCTURE: CPT | Mod: PO | Performed by: UROLOGY

## 2022-01-20 PROCEDURE — 85025 COMPLETE CBC W/AUTO DIFF WBC: CPT | Performed by: UROLOGY

## 2022-01-20 PROCEDURE — 80061 LIPID PANEL: CPT | Performed by: UROLOGY

## 2022-01-20 PROCEDURE — 80053 COMPREHEN METABOLIC PANEL: CPT | Performed by: UROLOGY

## 2022-01-20 PROCEDURE — 84153 ASSAY OF PSA TOTAL: CPT | Performed by: UROLOGY

## 2022-01-31 ENCOUNTER — PATIENT OUTREACH (OUTPATIENT)
Dept: ADMINISTRATIVE | Facility: OTHER | Age: 62
End: 2022-01-31
Payer: COMMERCIAL

## 2022-01-31 ENCOUNTER — OFFICE VISIT (OUTPATIENT)
Dept: UROLOGY | Facility: CLINIC | Age: 62
End: 2022-01-31
Payer: COMMERCIAL

## 2022-01-31 VITALS
BODY MASS INDEX: 25.27 KG/M2 | HEIGHT: 73 IN | WEIGHT: 190.69 LBS | SYSTOLIC BLOOD PRESSURE: 122 MMHG | DIASTOLIC BLOOD PRESSURE: 88 MMHG

## 2022-01-31 DIAGNOSIS — E29.1 HYPOGONADISM IN MALE: Primary | ICD-10-CM

## 2022-01-31 DIAGNOSIS — R35.1 NOCTURIA: ICD-10-CM

## 2022-01-31 PROCEDURE — 99999 PR PBB SHADOW E&M-EST. PATIENT-LVL III: CPT | Mod: PBBFAC,,, | Performed by: UROLOGY

## 2022-01-31 PROCEDURE — 99213 PR OFFICE/OUTPT VISIT, EST, LEVL III, 20-29 MIN: ICD-10-PCS | Mod: S$GLB,,, | Performed by: UROLOGY

## 2022-01-31 PROCEDURE — 99999 PR PBB SHADOW E&M-EST. PATIENT-LVL III: ICD-10-PCS | Mod: PBBFAC,,, | Performed by: UROLOGY

## 2022-01-31 PROCEDURE — 3079F PR MOST RECENT DIASTOLIC BLOOD PRESSURE 80-89 MM HG: ICD-10-PCS | Mod: CPTII,S$GLB,, | Performed by: UROLOGY

## 2022-01-31 PROCEDURE — 1159F MED LIST DOCD IN RCRD: CPT | Mod: CPTII,S$GLB,, | Performed by: UROLOGY

## 2022-01-31 PROCEDURE — 1159F PR MEDICATION LIST DOCUMENTED IN MEDICAL RECORD: ICD-10-PCS | Mod: CPTII,S$GLB,, | Performed by: UROLOGY

## 2022-01-31 PROCEDURE — 3074F PR MOST RECENT SYSTOLIC BLOOD PRESSURE < 130 MM HG: ICD-10-PCS | Mod: CPTII,S$GLB,, | Performed by: UROLOGY

## 2022-01-31 PROCEDURE — 99213 OFFICE O/P EST LOW 20 MIN: CPT | Mod: S$GLB,,, | Performed by: UROLOGY

## 2022-01-31 PROCEDURE — 3074F SYST BP LT 130 MM HG: CPT | Mod: CPTII,S$GLB,, | Performed by: UROLOGY

## 2022-01-31 PROCEDURE — 3008F BODY MASS INDEX DOCD: CPT | Mod: CPTII,S$GLB,, | Performed by: UROLOGY

## 2022-01-31 PROCEDURE — 3008F PR BODY MASS INDEX (BMI) DOCUMENTED: ICD-10-PCS | Mod: CPTII,S$GLB,, | Performed by: UROLOGY

## 2022-01-31 PROCEDURE — 3079F DIAST BP 80-89 MM HG: CPT | Mod: CPTII,S$GLB,, | Performed by: UROLOGY

## 2022-01-31 RX ORDER — TESTOSTERONE 40.5 MG/2.5G
40.5 GEL TOPICAL DAILY
Qty: 30 PACKET | Refills: 3 | Status: SHIPPED | OUTPATIENT
Start: 2022-01-31 | End: 2022-07-11 | Stop reason: SDUPTHER

## 2022-01-31 NOTE — PROGRESS NOTES
Chief Complaint   Patient presents with    Other     3 MONTH F/U       Referring Provider: No ref. provider found     History of Present Illness:   Hernan Fields is a 61 y.o. male here for evaluation of Other (3 MONTH F/U)      1/31/22- on Androgel. T level still low at 245. Forgetting to use about twice a week usually. Not sure he feels much different yet. No major changes in mood or libido. Nocturia x 2. LUTS not bothersome currently.   10/21/21-Testosterone level was low at 147 on 9/30/21. Had some lower levels prior to that. He reports lack of motivation, low libido. He reports that he is developing more body fat and loss of muscle mass.       Reports that he spoke with his cardiologist about testosterone replacement earlier this week.   He reports that he had CAD seen on an executive health physical and had stents placed in 2016.     Review of Systems   Constitutional: Negative for chills and fever.   Respiratory: Negative for shortness of breath.    Gastrointestinal: Negative for nausea and vomiting.   Musculoskeletal: Negative for back pain.   Neurological: Negative for numbness.   All other systems reviewed and are negative.        Past Medical History:   Diagnosis Date    Allergy     Anxiety     public speaking issues.    Atypical chest pain 11/5/2018    Coronary artery disease 12/15/2014    dr alexandra    Elevated PSA     Ex-smoker     one year at 20 y/o    False positive serological test for hepatitis C     H/O: Bell's palsy     affects left side    History of PTCA 9/12/2016    Hypertension     Meningitis     Mixed hyperlipidemia 12/15/2014       Past Surgical History:   Procedure Laterality Date    benign pros biops  1/14    CHOLECYSTECTOMY  05/01/2013    FRACTURE SURGERY      HERNIA REPAIR      SMALL INTESTINE SURGERY      TONSILLECTOMY         Family History   Problem Relation Age of Onset    COPD Father     Hypertension Father     Prostate cancer Neg Hx     Melanoma Neg Hx         Social History     Tobacco Use    Smoking status: Former Smoker     Quit date: 1980     Years since quittin.1    Smokeless tobacco: Never Used   Substance Use Topics    Alcohol use: Yes     Alcohol/week: 0.0 standard drinks    Drug use: No       Current Outpatient Medications   Medication Sig Dispense Refill    albuterol (PROVENTIL/VENTOLIN HFA) 90 mcg/actuation inhaler Inhale 2 puffs into the lungs every 6 (six) hours as needed for Wheezing or Shortness of Breath. 18 g 0    amLODIPine (NORVASC) 2.5 MG tablet TAKE 2 TABLETS (5 MG TOTAL) BY MOUTH ONCE DAILY. 180 tablet 2    aspirin (ECOTRIN) 81 MG EC tablet Take by mouth. 1 Tablet, Delayed Release (E.C.) Oral Every day      atorvastatin (LIPITOR) 80 MG tablet Take 1 tablet (80 mg total) by mouth every evening. 90 tablet 3    metoprolol succinate (TOPROL-XL) 50 MG 24 hr tablet Take 1 tablet (50 mg total) by mouth once daily. 90 tablet 3    ranolazine (RANEXA) 500 MG Tb12 TAKE 1 TABLET BY MOUTH TWICE A  tablet 2    sildenafiL (VIAGRA) 100 MG tablet 1/2 to 1 tab po qd prn erectile dysfunction. 10 tablet 6    tiZANidine (ZANAFLEX) 2 MG tablet TAKE 1/2 TO 1 TABLET BY MOUTH AT BEDTIME AS NEEDED FOR SPASMS 30 tablet 0    valacyclovir (VALTREX) 500 MG tablet Take 1 tablet by mouth once daily.  6    clonazePAM (KLONOPIN) 0.5 MG tablet Take 1 tablet (0.5 mg total) by mouth 2 (two) times daily as needed for Anxiety. 30 tablet 0    testosterone (ANDROGEL) 1.62 % (40.5 mg/2.5 gram) GlPk Place 40.5 mg onto the skin once daily. 30 packet 3     No current facility-administered medications for this visit.       Review of patient's allergies indicates:   Allergen Reactions    Iodine and iodide containing products Hives    Iodine Rash       Physical Exam  Vitals:    22 1057   BP: 122/88       General: Well-developed, well-nourished in no acute distress  HEENT: Normocephalic, atraumatic, Extraocular movements intact  Neck: supple, trachea  midline, no cervical or supraclavicular lymphadenopathy  Respirations: even and unlabored  Back: midline spine, no CVA tenderness  Rectal:10/21/21-30g prostate, no nodules or tenderness. No gross blood  Extremities: atraumatic, moves all equally, no clubbing, cyanosis or edema  Psych: normal affect  Skin: warm and dry, no lesions  Neuro: Alert and oriented, Cranial nerves II-XII grossly intact      Lab Results   Component Value Date    PSA 1.2 09/30/2021    PSA 1.0 08/19/2020    PSA 1.0 11/04/2019       Testosterone, Total   Date/Time Value Ref Range Status   01/20/2022 01:13  (L) 304 - 1227 ng/dL Final       Assessment:   1. Hypogonadism in male  POCT URINE DIPSTICK WITHOUT MICROSCOPE    CBC Without Differential    Comprehensive Metabolic Panel    Prostate Specific Antigen, Diagnostic    Testosterone   2. Nocturia         Plan:  Hypogonadism in male  -     POCT URINE DIPSTICK WITHOUT MICROSCOPE  -     CBC Without Differential; Future; Expected date: 01/31/2022  -     Comprehensive Metabolic Panel; Future; Expected date: 01/31/2022  -     Prostate Specific Antigen, Diagnostic; Future; Expected date: 01/31/2022  -     Testosterone; Future; Expected date: 01/31/2022    Nocturia    Other orders  -     testosterone (ANDROGEL) 1.62 % (40.5 mg/2.5 gram) GlPk; Place 40.5 mg onto the skin once daily.  Dispense: 30 packet; Refill: 3       Discussed increasing dose or changing modalities.   Pt would like to continue with same dose more consistently.     Follow up in about 3 months (around 4/30/2022).

## 2022-02-22 ENCOUNTER — HOSPITAL ENCOUNTER (OUTPATIENT)
Dept: RADIOLOGY | Facility: CLINIC | Age: 62
Discharge: HOME | End: 2022-02-22
Attending: NURSE PRACTITIONER
Payer: COMMERCIAL

## 2022-02-22 ENCOUNTER — OFFICE VISIT (OUTPATIENT)
Dept: URGENT CARE | Facility: CLINIC | Age: 62
End: 2022-02-22
Payer: COMMERCIAL

## 2022-02-22 VITALS
TEMPERATURE: 98 F | BODY MASS INDEX: 24.52 KG/M2 | OXYGEN SATURATION: 97 % | HEART RATE: 73 BPM | DIASTOLIC BLOOD PRESSURE: 94 MMHG | HEIGHT: 73 IN | RESPIRATION RATE: 18 BRPM | WEIGHT: 185 LBS | SYSTOLIC BLOOD PRESSURE: 150 MMHG

## 2022-02-22 DIAGNOSIS — K59.00 CONSTIPATION, UNSPECIFIED CONSTIPATION TYPE: ICD-10-CM

## 2022-02-22 DIAGNOSIS — R10.30 LOWER ABDOMINAL PAIN: Primary | ICD-10-CM

## 2022-02-22 DIAGNOSIS — R10.30 LOWER ABDOMINAL PAIN: ICD-10-CM

## 2022-02-22 PROCEDURE — 74019 RADEX ABDOMEN 2 VIEWS: CPT | Mod: S$GLB,,, | Performed by: RADIOLOGY

## 2022-02-22 PROCEDURE — 3008F BODY MASS INDEX DOCD: CPT | Mod: CPTII,S$GLB,, | Performed by: NURSE PRACTITIONER

## 2022-02-22 PROCEDURE — 3080F DIAST BP >= 90 MM HG: CPT | Mod: CPTII,S$GLB,, | Performed by: NURSE PRACTITIONER

## 2022-02-22 PROCEDURE — 74019 XR ABDOMEN FLAT AND ERECT: ICD-10-PCS | Mod: S$GLB,,, | Performed by: RADIOLOGY

## 2022-02-22 PROCEDURE — 1159F MED LIST DOCD IN RCRD: CPT | Mod: CPTII,S$GLB,, | Performed by: NURSE PRACTITIONER

## 2022-02-22 PROCEDURE — 99212 PR OFFICE/OUTPT VISIT, EST, LEVL II, 10-19 MIN: ICD-10-PCS | Mod: S$GLB,,, | Performed by: NURSE PRACTITIONER

## 2022-02-22 PROCEDURE — 3077F SYST BP >= 140 MM HG: CPT | Mod: CPTII,S$GLB,, | Performed by: NURSE PRACTITIONER

## 2022-02-22 PROCEDURE — 1159F PR MEDICATION LIST DOCUMENTED IN MEDICAL RECORD: ICD-10-PCS | Mod: CPTII,S$GLB,, | Performed by: NURSE PRACTITIONER

## 2022-02-22 PROCEDURE — 1160F PR REVIEW ALL MEDS BY PRESCRIBER/CLIN PHARMACIST DOCUMENTED: ICD-10-PCS | Mod: CPTII,S$GLB,, | Performed by: NURSE PRACTITIONER

## 2022-02-22 PROCEDURE — 3077F PR MOST RECENT SYSTOLIC BLOOD PRESSURE >= 140 MM HG: ICD-10-PCS | Mod: CPTII,S$GLB,, | Performed by: NURSE PRACTITIONER

## 2022-02-22 PROCEDURE — 3008F PR BODY MASS INDEX (BMI) DOCUMENTED: ICD-10-PCS | Mod: CPTII,S$GLB,, | Performed by: NURSE PRACTITIONER

## 2022-02-22 PROCEDURE — 1160F RVW MEDS BY RX/DR IN RCRD: CPT | Mod: CPTII,S$GLB,, | Performed by: NURSE PRACTITIONER

## 2022-02-22 PROCEDURE — 99212 OFFICE O/P EST SF 10 MIN: CPT | Mod: S$GLB,,, | Performed by: NURSE PRACTITIONER

## 2022-02-22 PROCEDURE — 3080F PR MOST RECENT DIASTOLIC BLOOD PRESSURE >= 90 MM HG: ICD-10-PCS | Mod: CPTII,S$GLB,, | Performed by: NURSE PRACTITIONER

## 2022-02-22 NOTE — PROGRESS NOTES
"Subjective:       Patient ID: Hernan Fields is a 61 y.o. male.    Vitals:  height is 6' 1" (1.854 m) and weight is 83.9 kg (185 lb). His oral temperature is 98.1 °F (36.7 °C). His blood pressure is 150/94 (abnormal) and his pulse is 73. His respiration is 18 and oxygen saturation is 97%.     Chief Complaint: Abdominal Pain (Entered by patient)    Pt presents today with abdominal pain for appx 6 days. Pt states it came on suddenly and it did radiate to his back on Saturday. Lasrt Bowel movement 3 days ago, hard and slow to expel. Denies nausea or vomiting. Denies change in appeteite     Abdominal Pain  This is a new problem. The current episode started in the past 7 days. The onset quality is sudden. The problem occurs constantly. The problem has been gradually improving. The pain is located in the generalized abdominal region. The pain is at a severity of 1/10. The pain is mild. The quality of the pain is cramping and a sensation of fullness. The abdominal pain radiates to the back. Associated symptoms include constipation. Pertinent negatives include no anorexia, arthralgias, belching, diarrhea, dysuria, fever, flatus, frequency, headaches, hematochezia, hematuria, melena, myalgias, nausea, vomiting or weight loss. Nothing aggravates the pain. The pain is relieved by certain positions. Treatments tried: milk of magnesia. The treatment provided no relief.       Constitution: Negative for fever.   Gastrointestinal: Positive for abdominal pain and constipation. Negative for nausea, vomiting, diarrhea and bright red blood in stool.   Genitourinary: Negative for dysuria, frequency and hematuria.   Musculoskeletal: Negative for joint pain and muscle ache.   Neurological: Negative for headaches.       Objective:      Physical Exam   Constitutional: He is oriented to person, place, and time. He appears well-developed.   HENT:   Head: Normocephalic and atraumatic.   Ears:   Right Ear: External ear normal.   Left Ear: " External ear normal.   Nose: Nose normal.   Mouth/Throat: Mucous membranes are normal.   Eyes: Conjunctivae and lids are normal.   Neck: Trachea normal. Neck supple.   Cardiovascular: Normal rate, regular rhythm, normal heart sounds and normal pulses.   Pulmonary/Chest: Effort normal and breath sounds normal. No respiratory distress.   Abdominal: Normal appearance and bowel sounds are normal. He exhibits no abdominal bruit, no pulsatile midline mass and no mass. Soft. flat abdomenThere is no guarding and no right CVA tenderness.      Comments: Abdomen is soft non distended. No guarding. Exhibits no mass   Musculoskeletal: Normal range of motion.         General: Normal range of motion.   Neurological: no focal deficit. He is alert and oriented to person, place, and time. He has normal strength.   Skin: Skin is warm, dry, intact, not diaphoretic and not pale. Capillary refill takes less than 2 seconds.   Psychiatric: His speech is normal and behavior is normal. Mood, judgment and thought content normal.   Nursing note and vitals reviewed.        Assessment:       1. Lower abdominal pain    2. Constipation, unspecified constipation type          Plan:         Lower abdominal pain  -     XR ABDOMEN FLAT AND ERECT; Future; Expected date: 02/22/2022    Constipation, unspecified constipation type            Abdominal xray shows large amounts of stool   Milk of magnesia 15 ml at a time with water and repeat up to four times per day as needed. Do not exceed more than 60 ml within any 24 hour period. Once large bowel movement is made, may take Miralex daily as directed. Increase water intake and fiber  intake. Continue to walk or jog daily.

## 2022-02-23 NOTE — PATIENT INSTRUCTIONS
Milk of magnesia 15 ml at a time with water and repeat up to four times per day as needed. Do not exceed more than 60 ml within any 24 hour period. Once large bowel movement is made, may take Miralex daily as directed. Increase water intake and fiber  intake. Continue to walk or jog daily.

## 2022-02-25 ENCOUNTER — TELEPHONE (OUTPATIENT)
Dept: URGENT CARE | Facility: CLINIC | Age: 62
End: 2022-02-25
Payer: COMMERCIAL

## 2022-04-18 ENCOUNTER — LAB VISIT (OUTPATIENT)
Dept: LAB | Facility: HOSPITAL | Age: 62
End: 2022-04-18
Attending: INTERNAL MEDICINE
Payer: COMMERCIAL

## 2022-04-18 DIAGNOSIS — E78.2 MIXED HYPERLIPIDEMIA: ICD-10-CM

## 2022-04-18 DIAGNOSIS — I25.118 CORONARY ARTERY DISEASE OF NATIVE ARTERY OF NATIVE HEART WITH STABLE ANGINA PECTORIS: ICD-10-CM

## 2022-04-18 LAB
ALBUMIN SERPL BCP-MCNC: 4.2 G/DL (ref 3.5–5.2)
ALP SERPL-CCNC: 89 U/L (ref 55–135)
ALT SERPL W/O P-5'-P-CCNC: 25 U/L (ref 10–44)
ANION GAP SERPL CALC-SCNC: 11 MMOL/L (ref 8–16)
AST SERPL-CCNC: 21 U/L (ref 10–40)
BILIRUB SERPL-MCNC: 0.7 MG/DL (ref 0.1–1)
BUN SERPL-MCNC: 14 MG/DL (ref 6–20)
CALCIUM SERPL-MCNC: 10.2 MG/DL (ref 8.7–10.5)
CHLORIDE SERPL-SCNC: 104 MMOL/L (ref 95–110)
CHOLEST SERPL-MCNC: 116 MG/DL (ref 120–199)
CHOLEST/HDLC SERPL: 3.1 {RATIO} (ref 2–5)
CO2 SERPL-SCNC: 27 MMOL/L (ref 23–29)
CREAT SERPL-MCNC: 1 MG/DL (ref 0.5–1.4)
CRP SERPL-MCNC: 3.11 MG/L (ref 0–3.19)
EST. GFR  (AFRICAN AMERICAN): >60 ML/MIN/1.73 M^2
EST. GFR  (NON AFRICAN AMERICAN): >60 ML/MIN/1.73 M^2
GLUCOSE SERPL-MCNC: 109 MG/DL (ref 70–110)
HDLC SERPL-MCNC: 38 MG/DL (ref 40–75)
HDLC SERPL: 32.8 % (ref 20–50)
LDLC SERPL CALC-MCNC: 54.2 MG/DL (ref 63–159)
NONHDLC SERPL-MCNC: 78 MG/DL
POTASSIUM SERPL-SCNC: 5.4 MMOL/L (ref 3.5–5.1)
PROT SERPL-MCNC: 7.4 G/DL (ref 6–8.4)
SODIUM SERPL-SCNC: 142 MMOL/L (ref 136–145)
TRIGL SERPL-MCNC: 119 MG/DL (ref 30–150)

## 2022-04-18 PROCEDURE — 80061 LIPID PANEL: CPT | Performed by: INTERNAL MEDICINE

## 2022-04-18 PROCEDURE — 80053 COMPREHEN METABOLIC PANEL: CPT | Performed by: INTERNAL MEDICINE

## 2022-04-18 PROCEDURE — 36415 COLL VENOUS BLD VENIPUNCTURE: CPT | Performed by: INTERNAL MEDICINE

## 2022-04-18 PROCEDURE — 86141 C-REACTIVE PROTEIN HS: CPT | Performed by: INTERNAL MEDICINE

## 2022-05-09 ENCOUNTER — OFFICE VISIT (OUTPATIENT)
Dept: CARDIOLOGY | Facility: CLINIC | Age: 62
End: 2022-05-09
Payer: COMMERCIAL

## 2022-05-09 VITALS
BODY MASS INDEX: 24.2 KG/M2 | HEIGHT: 73 IN | SYSTOLIC BLOOD PRESSURE: 116 MMHG | OXYGEN SATURATION: 98 % | WEIGHT: 182.56 LBS | RESPIRATION RATE: 16 BRPM | HEART RATE: 70 BPM | DIASTOLIC BLOOD PRESSURE: 76 MMHG

## 2022-05-09 DIAGNOSIS — R07.89 ATYPICAL CHEST PAIN: ICD-10-CM

## 2022-05-09 DIAGNOSIS — R93.1 AGATSTON CORONARY ARTERY CALCIUM SCORE GREATER THAN 400: ICD-10-CM

## 2022-05-09 DIAGNOSIS — I20.9 AP (ANGINA PECTORIS): ICD-10-CM

## 2022-05-09 DIAGNOSIS — I25.118 CORONARY ARTERY DISEASE OF NATIVE ARTERY OF NATIVE HEART WITH STABLE ANGINA PECTORIS: Primary | ICD-10-CM

## 2022-05-09 DIAGNOSIS — Z98.61 HISTORY OF PTCA: ICD-10-CM

## 2022-05-09 DIAGNOSIS — I10 PRIMARY HYPERTENSION: Chronic | ICD-10-CM

## 2022-05-09 DIAGNOSIS — R94.31 ABNORMAL ECG: ICD-10-CM

## 2022-05-09 DIAGNOSIS — E78.2 MIXED HYPERLIPIDEMIA: ICD-10-CM

## 2022-05-09 DIAGNOSIS — E87.5 HYPERKALEMIA: ICD-10-CM

## 2022-05-09 PROCEDURE — 99214 PR OFFICE/OUTPT VISIT, EST, LEVL IV, 30-39 MIN: ICD-10-PCS | Mod: S$GLB,,, | Performed by: INTERNAL MEDICINE

## 2022-05-09 PROCEDURE — 1160F RVW MEDS BY RX/DR IN RCRD: CPT | Mod: CPTII,S$GLB,, | Performed by: INTERNAL MEDICINE

## 2022-05-09 PROCEDURE — 3078F DIAST BP <80 MM HG: CPT | Mod: CPTII,S$GLB,, | Performed by: INTERNAL MEDICINE

## 2022-05-09 PROCEDURE — 99999 PR PBB SHADOW E&M-EST. PATIENT-LVL III: ICD-10-PCS | Mod: PBBFAC,,, | Performed by: INTERNAL MEDICINE

## 2022-05-09 PROCEDURE — 1160F PR REVIEW ALL MEDS BY PRESCRIBER/CLIN PHARMACIST DOCUMENTED: ICD-10-PCS | Mod: CPTII,S$GLB,, | Performed by: INTERNAL MEDICINE

## 2022-05-09 PROCEDURE — 1159F MED LIST DOCD IN RCRD: CPT | Mod: CPTII,S$GLB,, | Performed by: INTERNAL MEDICINE

## 2022-05-09 PROCEDURE — 3074F PR MOST RECENT SYSTOLIC BLOOD PRESSURE < 130 MM HG: ICD-10-PCS | Mod: CPTII,S$GLB,, | Performed by: INTERNAL MEDICINE

## 2022-05-09 PROCEDURE — 99999 PR PBB SHADOW E&M-EST. PATIENT-LVL III: CPT | Mod: PBBFAC,,, | Performed by: INTERNAL MEDICINE

## 2022-05-09 PROCEDURE — 1159F PR MEDICATION LIST DOCUMENTED IN MEDICAL RECORD: ICD-10-PCS | Mod: CPTII,S$GLB,, | Performed by: INTERNAL MEDICINE

## 2022-05-09 PROCEDURE — 99214 OFFICE O/P EST MOD 30 MIN: CPT | Mod: S$GLB,,, | Performed by: INTERNAL MEDICINE

## 2022-05-09 PROCEDURE — 3008F PR BODY MASS INDEX (BMI) DOCUMENTED: ICD-10-PCS | Mod: CPTII,S$GLB,, | Performed by: INTERNAL MEDICINE

## 2022-05-09 PROCEDURE — 3078F PR MOST RECENT DIASTOLIC BLOOD PRESSURE < 80 MM HG: ICD-10-PCS | Mod: CPTII,S$GLB,, | Performed by: INTERNAL MEDICINE

## 2022-05-09 PROCEDURE — 3008F BODY MASS INDEX DOCD: CPT | Mod: CPTII,S$GLB,, | Performed by: INTERNAL MEDICINE

## 2022-05-09 PROCEDURE — 3074F SYST BP LT 130 MM HG: CPT | Mod: CPTII,S$GLB,, | Performed by: INTERNAL MEDICINE

## 2022-05-09 NOTE — PROGRESS NOTES
Subjective:    Patient ID:  eHrnan Fields is a 61 y.o. male who presents for evaluation of Hypertension, Coronary Artery Disease, Hyperlipidemia, and Risk Factor Management For Atherosclerosis        HPI  Pt presents for eval.  His current medical conditions include CAD, dyslipidemia, HTN.   Nonsmoker.   + family h/o CAD.   Past hx pertinent for following:  H/o abnormal calcium score 1087 in 2014.   H/o  chronic atypical cp sxs  S/p PCI RCA NADINE x 2 June 2016; calcified nonobstructive LAD/left main disease as well, normal LVEF.  Stress echo 9/18 good exercise capacity, no wall motion abnormality, normal LV function.  ecg 10/19/21 NSR, nonspecific st abnl.  Stress echo 9/21 reviewed: good exercise capacity, exercised 11 minutes, no ecg changes, no ischemia on echo images.  Echo normal LV function.  Now here.  Chronic atypical cp sxs, overall stable.  No change in pattern/frequency.  No typical angina.  No CHF sxs.  Weight down some.  Lipids well controlled, on statin tx.  CRP wnl, creeping up.  HTN is controlled on current med tx.  Exercising.  Good on diet.  K 5.4 on labs, usually runs high normal in past.        Past Medical History:   Diagnosis Date    Allergy     Anxiety     public speaking issues.    Atypical chest pain 11/5/2018    Coronary artery disease 12/15/2014    dr alexandra    Elevated PSA     Ex-smoker     one year at 20 y/o    False positive serological test for hepatitis C     H/O: Bell's palsy     affects left side    History of PTCA 9/12/2016    Hypertension     Meningitis     Mixed hyperlipidemia 12/15/2014     Current Outpatient Medications   Medication Instructions    albuterol (PROVENTIL/VENTOLIN HFA) 90 mcg/actuation inhaler 2 puffs, Inhalation, Every 6 hours PRN    amLODIPine (NORVASC) 5 mg, Oral, Daily    aspirin (ECOTRIN) 81 MG EC tablet Take by mouth. 1 Tablet, Delayed Release (E.C.) Oral Every day    atorvastatin (LIPITOR) 80 mg, Oral, Nightly    clonazePAM (KLONOPIN) 0.5 mg,  "Oral, 2 times daily PRN    metoprolol succinate (TOPROL-XL) 50 mg, Oral, Daily    ranolazine (RANEXA) 500 MG Tb12 TAKE 1 TABLET BY MOUTH TWICE A DAY    sildenafiL (VIAGRA) 100 MG tablet 1/2 to 1 tab po qd prn erectile dysfunction.    testosterone (ANDROGEL) 40.5 mg, Transdermal, Daily    tiZANidine (ZANAFLEX) 2 MG tablet TAKE 1/2 TO 1 TABLET BY MOUTH AT BEDTIME AS NEEDED FOR SPASMS    valacyclovir (VALTREX) 500 MG tablet 1 tablet, Oral, Daily         Review of Systems   Constitutional: Positive for weight loss.   HENT: Negative.    Eyes: Negative.    Cardiovascular: Positive for chest pain.   Respiratory: Negative.    Endocrine: Negative.    Hematologic/Lymphatic: Negative.    Skin: Negative.    Musculoskeletal: Negative.    Gastrointestinal: Negative.    Genitourinary: Negative.    Neurological: Negative.    Psychiatric/Behavioral: Negative.    Allergic/Immunologic: Negative.        /76 (BP Location: Right arm, Patient Position: Sitting, BP Method: Large (Manual))   Pulse 70   Resp 16   Ht 6' 1" (1.854 m)   Wt 82.8 kg (182 lb 8.7 oz)   SpO2 98%   BMI 24.08 kg/m²       Wt Readings from Last 3 Encounters:   05/09/22 82.8 kg (182 lb 8.7 oz)   02/22/22 83.9 kg (185 lb)   01/31/22 86.5 kg (190 lb 11.2 oz)     Temp Readings from Last 3 Encounters:   02/22/22 98.1 °F (36.7 °C) (Oral)   09/30/21 96.2 °F (35.7 °C) (Tympanic)   10/15/20 97.7 °F (36.5 °C) (Tympanic)     BP Readings from Last 3 Encounters:   05/09/22 116/76   02/22/22 (!) 150/94   01/31/22 122/88     Pulse Readings from Last 3 Encounters:   05/09/22 70   02/22/22 73   10/19/21 60          Objective:    Physical Exam  Vitals and nursing note reviewed.   Constitutional:       Appearance: He is well-developed.   HENT:      Head: Normocephalic.   Neck:      Thyroid: No thyromegaly.      Vascular: Normal carotid pulses. No carotid bruit, hepatojugular reflux or JVD.   Cardiovascular:      Rate and Rhythm: Normal rate and regular rhythm.      Chest " Wall: PMI is not displaced.      Pulses:           Radial pulses are 2+ on the right side and 2+ on the left side.      Heart sounds: S1 normal and S2 normal. Heart sounds not distant. No midsystolic click and no opening snap. No murmur heard.    No friction rub. No S3 or S4 sounds.   Pulmonary:      Effort: Pulmonary effort is normal.      Breath sounds: Normal breath sounds. No wheezing or rales.   Abdominal:      General: Bowel sounds are normal. There is no distension or abdominal bruit.      Palpations: Abdomen is soft. There is no mass.      Tenderness: There is no abdominal tenderness.   Musculoskeletal:      Cervical back: Normal range of motion and neck supple.   Skin:     General: Skin is warm.   Neurological:      Mental Status: He is alert and oriented to person, place, and time.   Psychiatric:         Behavior: Behavior normal.     I have reviewed all pertinent labs and cardiac studies.    Component      Latest Ref Rng & Units 4/18/2022   CRP, High Sensitivity      0.00 - 3.19 mg/L 3.11         Chemistry        Component Value Date/Time     04/18/2022 0824    K 5.4 (H) 04/18/2022 0824     04/18/2022 0824    CO2 27 04/18/2022 0824    BUN 14 04/18/2022 0824    CREATININE 1.0 04/18/2022 0824     04/18/2022 0824        Component Value Date/Time    CALCIUM 10.2 04/18/2022 0824    ALKPHOS 89 04/18/2022 0824    AST 21 04/18/2022 0824    ALT 25 04/18/2022 0824    BILITOT 0.7 04/18/2022 0824    ESTGFRAFRICA >60.0 04/18/2022 0824    EGFRNONAA >60.0 04/18/2022 0824        Lab Results   Component Value Date    WBC 5.67 01/20/2022    HGB 14.4 01/20/2022    HCT 45.0 01/20/2022     (H) 01/20/2022     01/20/2022       Lab Results   Component Value Date    HGBA1C 5.1 09/30/2021     Lab Results   Component Value Date    CHOL 116 (L) 04/18/2022    CHOL 104 (L) 01/20/2022    CHOL 108 (L) 09/30/2021     Lab Results   Component Value Date    HDL 38 (L) 04/18/2022    HDL 37 (L) 01/20/2022     HDL 36 (L) 09/30/2021     Lab Results   Component Value Date    LDLCALC 54.2 (L) 04/18/2022    LDLCALC 49.4 (L) 01/20/2022    LDLCALC 58.6 (L) 09/30/2021     Lab Results   Component Value Date    TRIG 119 04/18/2022    TRIG 88 01/20/2022    TRIG 67 09/30/2021     Lab Results   Component Value Date    CHOLHDL 32.8 04/18/2022    CHOLHDL 35.6 01/20/2022    CHOLHDL 33.3 09/30/2021               Assessment:       1. Coronary artery disease of native artery of native heart with stable angina pectoris    2. Agatston coronary artery calcium score greater than 400    3. AP (angina pectoris)    4. Atypical chest pain    5. History of PTCA    6. Primary hypertension    7. Mixed hyperlipidemia    8. Abnormal ECG    9. Hyperkalemia         Plan:             Repeat BMP 1 month to reassess K+ level.  Stable cardiovascular conditions at present time on current medical treatment.  Continue med tx for CAD.  Reviewed all tests and above medical conditions with patient in detail and formulated treatment plan.  Continue optimal medical treatment for cardiovascular conditions.  Cardiac low salt diet advised.  Daily exercise encouraged, with the goal 30 +  minutes aerobic exercise as tolerated.  Maintaining healthy weight and weight loss goals (if needed) were discussed in clinic.  Need for BP control and HTN goals (if needed) were discussed and tx plan formulated.  Goal BP < 130/80.  Continue current HTN meds.  Importance of optimal lipid control were discussed in detail as well as possible pharmacologic and lifestyle changes that may be needed.  Continue statin tx.  F/u in 6 months w CMP, FLP CRP.      I have reviewed all pertinent labs and cardiac studies independently. Plans and recommendations have been formulated under my direct supervision. All questions answered and patient voiced understanding.

## 2022-06-04 ENCOUNTER — PATIENT MESSAGE (OUTPATIENT)
Dept: UROLOGY | Facility: CLINIC | Age: 62
End: 2022-06-04
Payer: COMMERCIAL

## 2022-06-09 ENCOUNTER — LAB VISIT (OUTPATIENT)
Dept: LAB | Facility: HOSPITAL | Age: 62
End: 2022-06-09
Attending: INTERNAL MEDICINE
Payer: COMMERCIAL

## 2022-06-09 DIAGNOSIS — E87.5 HYPERKALEMIA: ICD-10-CM

## 2022-06-09 DIAGNOSIS — E29.1 HYPOGONADISM IN MALE: ICD-10-CM

## 2022-06-09 LAB
ALBUMIN SERPL BCP-MCNC: 4.4 G/DL (ref 3.5–5.2)
ALP SERPL-CCNC: 93 U/L (ref 55–135)
ALT SERPL W/O P-5'-P-CCNC: 24 U/L (ref 10–44)
ANION GAP SERPL CALC-SCNC: 9 MMOL/L (ref 8–16)
ANION GAP SERPL CALC-SCNC: 9 MMOL/L (ref 8–16)
AST SERPL-CCNC: 20 U/L (ref 10–40)
BILIRUB SERPL-MCNC: 0.6 MG/DL (ref 0.1–1)
BUN SERPL-MCNC: 18 MG/DL (ref 6–20)
BUN SERPL-MCNC: 18 MG/DL (ref 6–20)
CALCIUM SERPL-MCNC: 10.3 MG/DL (ref 8.7–10.5)
CALCIUM SERPL-MCNC: 10.3 MG/DL (ref 8.7–10.5)
CHLORIDE SERPL-SCNC: 105 MMOL/L (ref 95–110)
CHLORIDE SERPL-SCNC: 105 MMOL/L (ref 95–110)
CO2 SERPL-SCNC: 27 MMOL/L (ref 23–29)
CO2 SERPL-SCNC: 27 MMOL/L (ref 23–29)
COMPLEXED PSA SERPL-MCNC: 1.2 NG/ML (ref 0–4)
CREAT SERPL-MCNC: 1 MG/DL (ref 0.5–1.4)
CREAT SERPL-MCNC: 1 MG/DL (ref 0.5–1.4)
ERYTHROCYTE [DISTWIDTH] IN BLOOD BY AUTOMATED COUNT: 11.4 % (ref 11.5–14.5)
EST. GFR  (AFRICAN AMERICAN): >60 ML/MIN/1.73 M^2
EST. GFR  (AFRICAN AMERICAN): >60 ML/MIN/1.73 M^2
EST. GFR  (NON AFRICAN AMERICAN): >60 ML/MIN/1.73 M^2
EST. GFR  (NON AFRICAN AMERICAN): >60 ML/MIN/1.73 M^2
GLUCOSE SERPL-MCNC: 92 MG/DL (ref 70–110)
GLUCOSE SERPL-MCNC: 92 MG/DL (ref 70–110)
HCT VFR BLD AUTO: 47 % (ref 40–54)
HGB BLD-MCNC: 15.3 G/DL (ref 14–18)
MCH RBC QN AUTO: 33 PG (ref 27–31)
MCHC RBC AUTO-ENTMCNC: 32.6 G/DL (ref 32–36)
MCV RBC AUTO: 101 FL (ref 82–98)
PLATELET # BLD AUTO: 262 K/UL (ref 150–450)
PMV BLD AUTO: 11 FL (ref 9.2–12.9)
POTASSIUM SERPL-SCNC: 5.1 MMOL/L (ref 3.5–5.1)
POTASSIUM SERPL-SCNC: 5.1 MMOL/L (ref 3.5–5.1)
PROT SERPL-MCNC: 7.1 G/DL (ref 6–8.4)
RBC # BLD AUTO: 4.64 M/UL (ref 4.6–6.2)
SODIUM SERPL-SCNC: 141 MMOL/L (ref 136–145)
SODIUM SERPL-SCNC: 141 MMOL/L (ref 136–145)
TESTOST SERPL-MCNC: 342 NG/DL (ref 304–1227)
WBC # BLD AUTO: 4.3 K/UL (ref 3.9–12.7)

## 2022-06-09 PROCEDURE — 84403 ASSAY OF TOTAL TESTOSTERONE: CPT | Performed by: UROLOGY

## 2022-06-09 PROCEDURE — 80053 COMPREHEN METABOLIC PANEL: CPT | Performed by: UROLOGY

## 2022-06-09 PROCEDURE — 85027 COMPLETE CBC AUTOMATED: CPT | Performed by: UROLOGY

## 2022-06-09 PROCEDURE — 36415 COLL VENOUS BLD VENIPUNCTURE: CPT | Performed by: UROLOGY

## 2022-06-09 PROCEDURE — 84153 ASSAY OF PSA TOTAL: CPT | Performed by: UROLOGY

## 2022-07-11 ENCOUNTER — LAB VISIT (OUTPATIENT)
Dept: LAB | Facility: HOSPITAL | Age: 62
End: 2022-07-11
Attending: UROLOGY
Payer: COMMERCIAL

## 2022-07-11 ENCOUNTER — OFFICE VISIT (OUTPATIENT)
Dept: UROLOGY | Facility: CLINIC | Age: 62
End: 2022-07-11
Payer: COMMERCIAL

## 2022-07-11 VITALS
BODY MASS INDEX: 23.7 KG/M2 | DIASTOLIC BLOOD PRESSURE: 70 MMHG | SYSTOLIC BLOOD PRESSURE: 112 MMHG | HEART RATE: 61 BPM | HEIGHT: 73 IN | WEIGHT: 178.81 LBS

## 2022-07-11 DIAGNOSIS — E29.1 HYPOGONADISM IN MALE: ICD-10-CM

## 2022-07-11 DIAGNOSIS — N40.1 BPH WITH OBSTRUCTION/LOWER URINARY TRACT SYMPTOMS: ICD-10-CM

## 2022-07-11 DIAGNOSIS — R35.1 NOCTURIA: ICD-10-CM

## 2022-07-11 DIAGNOSIS — N13.8 BPH WITH OBSTRUCTION/LOWER URINARY TRACT SYMPTOMS: ICD-10-CM

## 2022-07-11 DIAGNOSIS — E29.1 HYPOGONADISM IN MALE: Primary | ICD-10-CM

## 2022-07-11 PROCEDURE — 1159F MED LIST DOCD IN RCRD: CPT | Mod: CPTII,S$GLB,, | Performed by: UROLOGY

## 2022-07-11 PROCEDURE — 99999 PR PBB SHADOW E&M-EST. PATIENT-LVL III: CPT | Mod: PBBFAC,,, | Performed by: UROLOGY

## 2022-07-11 PROCEDURE — 36415 COLL VENOUS BLD VENIPUNCTURE: CPT | Performed by: UROLOGY

## 2022-07-11 PROCEDURE — 3008F BODY MASS INDEX DOCD: CPT | Mod: CPTII,S$GLB,, | Performed by: UROLOGY

## 2022-07-11 PROCEDURE — 3074F PR MOST RECENT SYSTOLIC BLOOD PRESSURE < 130 MM HG: ICD-10-PCS | Mod: CPTII,S$GLB,, | Performed by: UROLOGY

## 2022-07-11 PROCEDURE — 3078F DIAST BP <80 MM HG: CPT | Mod: CPTII,S$GLB,, | Performed by: UROLOGY

## 2022-07-11 PROCEDURE — 99213 OFFICE O/P EST LOW 20 MIN: CPT | Mod: S$GLB,,, | Performed by: UROLOGY

## 2022-07-11 PROCEDURE — 3078F PR MOST RECENT DIASTOLIC BLOOD PRESSURE < 80 MM HG: ICD-10-PCS | Mod: CPTII,S$GLB,, | Performed by: UROLOGY

## 2022-07-11 PROCEDURE — 3008F PR BODY MASS INDEX (BMI) DOCUMENTED: ICD-10-PCS | Mod: CPTII,S$GLB,, | Performed by: UROLOGY

## 2022-07-11 PROCEDURE — 99999 PR PBB SHADOW E&M-EST. PATIENT-LVL III: ICD-10-PCS | Mod: PBBFAC,,, | Performed by: UROLOGY

## 2022-07-11 PROCEDURE — 3074F SYST BP LT 130 MM HG: CPT | Mod: CPTII,S$GLB,, | Performed by: UROLOGY

## 2022-07-11 PROCEDURE — 99213 PR OFFICE/OUTPT VISIT, EST, LEVL III, 20-29 MIN: ICD-10-PCS | Mod: S$GLB,,, | Performed by: UROLOGY

## 2022-07-11 PROCEDURE — 1159F PR MEDICATION LIST DOCUMENTED IN MEDICAL RECORD: ICD-10-PCS | Mod: CPTII,S$GLB,, | Performed by: UROLOGY

## 2022-07-11 PROCEDURE — 82670 ASSAY OF TOTAL ESTRADIOL: CPT | Performed by: UROLOGY

## 2022-07-11 RX ORDER — TAMSULOSIN HYDROCHLORIDE 0.4 MG/1
0.4 CAPSULE ORAL DAILY
Qty: 30 CAPSULE | Refills: 11 | Status: SHIPPED | OUTPATIENT
Start: 2022-07-11 | End: 2022-09-20

## 2022-07-11 RX ORDER — TESTOSTERONE 40.5 MG/2.5G
40.5 GEL TOPICAL DAILY
Qty: 30 PACKET | Refills: 5 | Status: SHIPPED | OUTPATIENT
Start: 2022-07-11 | End: 2022-09-20

## 2022-07-11 NOTE — PROGRESS NOTES
Chief Complaint   Patient presents with    Follow-up     3mo. Review labs       History of Present Illness:   Hernan Fields is a 62 y.o. male here for evaluation of Follow-up (3mo. Review labs)    7/11/22- feels like he is getting gynecomastia and abdominal fat. Feels well, energy and motivation improved. Testosterone is a little better at 342. Nocturia  X 2-3. Stream varies. Urgency at night but smaller volumes. Does drink a lot of tea during the day. Denies improvement in libido. No ED.   1/31/22- on Androgel. T level still low at 245. Forgetting to use about twice a week usually. Not sure he feels much different yet. No major changes in mood or libido. Nocturia x 2. LUTS not bothersome currently.   10/21/21-Testosterone level was low at 147 on 9/30/21. Had some lower levels prior to that. He reports lack of motivation, low libido. He reports that he is developing more body fat and loss of muscle mass.       Reports that he spoke with his cardiologist about testosterone replacement earlier this week.   He reports that he had CAD seen on an executive health physical and had stents placed in 2016.     Review of Systems   Constitutional: Negative for chills and fever.   Respiratory: Negative for shortness of breath.    Gastrointestinal: Negative for nausea and vomiting.   Musculoskeletal: Negative for back pain.   Neurological: Negative for numbness.   All other systems reviewed and are negative.        Past Medical History:   Diagnosis Date    Allergy     Anxiety     public speaking issues.    Atypical chest pain 11/5/2018    Coronary artery disease 12/15/2014    dr alexandra    Elevated PSA     Ex-smoker     one year at 18 y/o    False positive serological test for hepatitis C     H/O: Bell's palsy     affects left side    History of PTCA 9/12/2016    Hypertension     Meningitis     Mixed hyperlipidemia 12/15/2014       Past Surgical History:   Procedure Laterality Date    benign pros biops  1/14     CHOLECYSTECTOMY  2013    FRACTURE SURGERY      HERNIA REPAIR      SMALL INTESTINE SURGERY      TONSILLECTOMY         Family History   Problem Relation Age of Onset    COPD Father     Hypertension Father     Prostate cancer Neg Hx     Melanoma Neg Hx        Social History     Tobacco Use    Smoking status: Former Smoker     Quit date: 1980     Years since quittin.5    Smokeless tobacco: Never Used   Substance Use Topics    Alcohol use: Yes     Alcohol/week: 0.0 standard drinks    Drug use: No       Current Outpatient Medications   Medication Sig Dispense Refill    albuterol (PROVENTIL/VENTOLIN HFA) 90 mcg/actuation inhaler Inhale 2 puffs into the lungs every 6 (six) hours as needed for Wheezing or Shortness of Breath. 18 g 0    amLODIPine (NORVASC) 2.5 MG tablet TAKE 2 TABLETS (5 MG TOTAL) BY MOUTH ONCE DAILY. 180 tablet 2    aspirin (ECOTRIN) 81 MG EC tablet Take by mouth. 1 Tablet, Delayed Release (E.C.) Oral Every day      atorvastatin (LIPITOR) 80 MG tablet Take 1 tablet (80 mg total) by mouth every evening. 90 tablet 3    metoprolol succinate (TOPROL-XL) 50 MG 24 hr tablet Take 1 tablet (50 mg total) by mouth once daily. 90 tablet 3    ranolazine (RANEXA) 500 MG Tb12 TAKE 1 TABLET BY MOUTH TWICE A  tablet 2    sildenafiL (VIAGRA) 100 MG tablet 1/2 to 1 tab po qd prn erectile dysfunction. 10 tablet 6    tiZANidine (ZANAFLEX) 2 MG tablet TAKE 1/2 TO 1 TABLET BY MOUTH AT BEDTIME AS NEEDED FOR SPASMS 30 tablet 0    valacyclovir (VALTREX) 500 MG tablet Take 1 tablet by mouth once daily.  6    clonazePAM (KLONOPIN) 0.5 MG tablet Take 1 tablet (0.5 mg total) by mouth 2 (two) times daily as needed for Anxiety. 30 tablet 0    tamsulosin (FLOMAX) 0.4 mg Cap Take 1 capsule (0.4 mg total) by mouth once daily. 30 capsule 11    testosterone (ANDROGEL) 1.62 % (40.5 mg/2.5 gram) GlPk Place 40.5 mg onto the skin once daily. 30 packet 5     No current facility-administered medications  for this visit.       Review of patient's allergies indicates:   Allergen Reactions    Iodine and iodide containing products Hives    Iodine Rash       Physical Exam  Vitals:    07/11/22 1049   BP: 112/70   Pulse: 61       General: Well-developed, well-nourished in no acute distress  HEENT: Normocephalic, atraumatic, Extraocular movements intact  Neck: supple, trachea midline, no cervical or supraclavicular lymphadenopathy  Respirations: even and unlabored  Back: midline spine, no CVA tenderness  Rectal:10/21/21-30g prostate, no nodules or tenderness. No gross blood  Extremities: atraumatic, moves all equally, no clubbing, cyanosis or edema  Psych: normal affect  Skin: warm and dry, no lesions  Neuro: Alert and oriented, Cranial nerves II-XII grossly intact    Urinalysis: negative for blood, LE, nit    Lab Results   Component Value Date    PSA 1.2 09/30/2021    PSA 1.0 08/19/2020    PSA 1.0 11/04/2019       Testosterone, Total   Date/Time Value Ref Range Status   06/09/2022 08:19  304 - 1227 ng/dL Final       Assessment:   1. Hypogonadism in male  ESTRADIOL    Testosterone    Prostate Specific Antigen, Diagnostic    CBC Auto Differential    Comprehensive Metabolic Panel   2. Nocturia     3. BPH with obstruction/lower urinary tract symptoms         Plan:  Hypogonadism in male  -     ESTRADIOL; Future; Expected date: 07/11/2022  -     Testosterone; Future; Expected date: 01/18/2023  -     Prostate Specific Antigen, Diagnostic; Future; Expected date: 01/18/2023  -     CBC Auto Differential; Future; Expected date: 01/18/2023  -     Comprehensive Metabolic Panel; Future; Expected date: 01/18/2023    Nocturia    BPH with obstruction/lower urinary tract symptoms    Other orders  -     tamsulosin (FLOMAX) 0.4 mg Cap; Take 1 capsule (0.4 mg total) by mouth once daily.  Dispense: 30 capsule; Refill: 11  -     testosterone (ANDROGEL) 1.62 % (40.5 mg/2.5 gram) GlPk; Place 40.5 mg onto the skin once daily.  Dispense: 30  packet; Refill: 5           Follow up in about 6 months (around 1/11/2023) for labs before visit.

## 2022-07-12 LAB — ESTRADIOL SERPL-MCNC: 13 PG/ML (ref 11–44)

## 2022-07-27 ENCOUNTER — PATIENT MESSAGE (OUTPATIENT)
Dept: ADMINISTRATIVE | Facility: HOSPITAL | Age: 62
End: 2022-07-27
Payer: COMMERCIAL

## 2022-08-30 DIAGNOSIS — Z13.6 ENCOUNTER FOR SCREENING FOR CARDIOVASCULAR DISORDERS: ICD-10-CM

## 2022-08-30 DIAGNOSIS — Z00.00 ROUTINE GENERAL MEDICAL EXAMINATION AT A HEALTH CARE FACILITY: Primary | ICD-10-CM

## 2022-09-20 ENCOUNTER — OFFICE VISIT (OUTPATIENT)
Dept: INTERNAL MEDICINE | Facility: CLINIC | Age: 62
End: 2022-09-20
Payer: COMMERCIAL

## 2022-09-20 ENCOUNTER — CLINICAL SUPPORT (OUTPATIENT)
Dept: INTERNAL MEDICINE | Facility: CLINIC | Age: 62
End: 2022-09-20
Payer: COMMERCIAL

## 2022-09-20 ENCOUNTER — HOSPITAL ENCOUNTER (OUTPATIENT)
Dept: RADIOLOGY | Facility: HOSPITAL | Age: 62
Discharge: HOME | End: 2022-09-20
Attending: INTERNAL MEDICINE
Payer: COMMERCIAL

## 2022-09-20 VITALS
DIASTOLIC BLOOD PRESSURE: 72 MMHG | SYSTOLIC BLOOD PRESSURE: 108 MMHG | WEIGHT: 174 LBS | HEIGHT: 72 IN | TEMPERATURE: 97 F | HEART RATE: 66 BPM | BODY MASS INDEX: 23.57 KG/M2

## 2022-09-20 DIAGNOSIS — R07.89 ATYPICAL CHEST PAIN: ICD-10-CM

## 2022-09-20 DIAGNOSIS — I25.83 CORONARY ARTERY DISEASE DUE TO LIPID RICH PLAQUE: ICD-10-CM

## 2022-09-20 DIAGNOSIS — F41.9 ANXIETY: ICD-10-CM

## 2022-09-20 DIAGNOSIS — I10 ESSENTIAL HYPERTENSION: Chronic | ICD-10-CM

## 2022-09-20 DIAGNOSIS — E78.2 MIXED HYPERLIPIDEMIA: ICD-10-CM

## 2022-09-20 DIAGNOSIS — I20.9 AP (ANGINA PECTORIS): ICD-10-CM

## 2022-09-20 DIAGNOSIS — Z23 NEED FOR INFLUENZA VACCINATION: ICD-10-CM

## 2022-09-20 DIAGNOSIS — I25.10 CORONARY ARTERY DISEASE DUE TO LIPID RICH PLAQUE: ICD-10-CM

## 2022-09-20 DIAGNOSIS — Z00.00 ROUTINE GENERAL MEDICAL EXAMINATION AT A HEALTH CARE FACILITY: Primary | ICD-10-CM

## 2022-09-20 DIAGNOSIS — R93.1 AGATSTON CORONARY ARTERY CALCIUM SCORE GREATER THAN 400: ICD-10-CM

## 2022-09-20 DIAGNOSIS — I10 PRIMARY HYPERTENSION: Chronic | ICD-10-CM

## 2022-09-20 DIAGNOSIS — Z00.00 ROUTINE GENERAL MEDICAL EXAMINATION AT A HEALTH CARE FACILITY: ICD-10-CM

## 2022-09-20 LAB
ALBUMIN SERPL BCP-MCNC: 4.6 G/DL (ref 3.5–5.2)
ALP SERPL-CCNC: 67 U/L (ref 55–135)
ALT SERPL W/O P-5'-P-CCNC: 31 U/L (ref 10–44)
ANION GAP SERPL CALC-SCNC: 11 MMOL/L (ref 8–16)
AST SERPL-CCNC: 20 U/L (ref 10–40)
BILIRUB SERPL-MCNC: 1.1 MG/DL (ref 0.1–1)
BUN SERPL-MCNC: 16 MG/DL (ref 6–20)
CALCIUM SERPL-MCNC: 10.4 MG/DL (ref 8.7–10.5)
CHLORIDE SERPL-SCNC: 105 MMOL/L (ref 95–110)
CHOLEST SERPL-MCNC: 123 MG/DL (ref 120–199)
CHOLEST/HDLC SERPL: 2.9 {RATIO} (ref 2–5)
CO2 SERPL-SCNC: 28 MMOL/L (ref 23–29)
COMPLEXED PSA SERPL-MCNC: 1.1 NG/ML (ref 0–4)
CREAT SERPL-MCNC: 1.1 MG/DL (ref 0.5–1.4)
ERYTHROCYTE [DISTWIDTH] IN BLOOD BY AUTOMATED COUNT: 11.8 % (ref 11.5–14.5)
EST. GFR  (NO RACE VARIABLE): >60 ML/MIN/1.73 M^2
ESTIMATED AVG GLUCOSE: 97 MG/DL (ref 68–131)
GLUCOSE SERPL-MCNC: 103 MG/DL (ref 70–110)
HBA1C MFR BLD: 5 % (ref 4–5.6)
HCT VFR BLD AUTO: 46.2 % (ref 40–54)
HDLC SERPL-MCNC: 42 MG/DL (ref 40–75)
HDLC SERPL: 34.1 % (ref 20–50)
HGB BLD-MCNC: 15.7 G/DL (ref 14–18)
LDLC SERPL CALC-MCNC: 55.8 MG/DL (ref 63–159)
MCH RBC QN AUTO: 32.8 PG (ref 27–31)
MCHC RBC AUTO-ENTMCNC: 34 G/DL (ref 32–36)
MCV RBC AUTO: 97 FL (ref 82–98)
NONHDLC SERPL-MCNC: 81 MG/DL
PLATELET # BLD AUTO: 248 K/UL (ref 150–450)
PMV BLD AUTO: 10.1 FL (ref 9.2–12.9)
POTASSIUM SERPL-SCNC: 5 MMOL/L (ref 3.5–5.1)
PROT SERPL-MCNC: 7.5 G/DL (ref 6–8.4)
RBC # BLD AUTO: 4.79 M/UL (ref 4.6–6.2)
SODIUM SERPL-SCNC: 144 MMOL/L (ref 136–145)
TRIGL SERPL-MCNC: 126 MG/DL (ref 30–150)
TSH SERPL DL<=0.005 MIU/L-ACNC: 1.6 UIU/ML (ref 0.4–4)
WBC # BLD AUTO: 5.47 K/UL (ref 3.9–12.7)

## 2022-09-20 PROCEDURE — 99396 PREV VISIT EST AGE 40-64: CPT | Mod: 25,S$GLB,, | Performed by: INTERNAL MEDICINE

## 2022-09-20 PROCEDURE — 80061 LIPID PANEL: CPT | Performed by: INTERNAL MEDICINE

## 2022-09-20 PROCEDURE — 1159F MED LIST DOCD IN RCRD: CPT | Mod: CPTII,S$GLB,, | Performed by: INTERNAL MEDICINE

## 2022-09-20 PROCEDURE — 84153 ASSAY OF PSA TOTAL: CPT | Performed by: INTERNAL MEDICINE

## 2022-09-20 PROCEDURE — 3074F PR MOST RECENT SYSTOLIC BLOOD PRESSURE < 130 MM HG: ICD-10-PCS | Mod: CPTII,S$GLB,, | Performed by: INTERNAL MEDICINE

## 2022-09-20 PROCEDURE — 3044F PR MOST RECENT HEMOGLOBIN A1C LEVEL <7.0%: ICD-10-PCS | Mod: CPTII,S$GLB,, | Performed by: INTERNAL MEDICINE

## 2022-09-20 PROCEDURE — 99396 PR PREVENTIVE VISIT,EST,40-64: ICD-10-PCS | Mod: 25,S$GLB,, | Performed by: INTERNAL MEDICINE

## 2022-09-20 PROCEDURE — 99999 PR PBB SHADOW E&M-EST. PATIENT-LVL III: CPT | Mod: PBBFAC,,, | Performed by: INTERNAL MEDICINE

## 2022-09-20 PROCEDURE — 85027 COMPLETE CBC AUTOMATED: CPT | Performed by: INTERNAL MEDICINE

## 2022-09-20 PROCEDURE — 1159F PR MEDICATION LIST DOCUMENTED IN MEDICAL RECORD: ICD-10-PCS | Mod: CPTII,S$GLB,, | Performed by: INTERNAL MEDICINE

## 2022-09-20 PROCEDURE — 84443 ASSAY THYROID STIM HORMONE: CPT | Performed by: INTERNAL MEDICINE

## 2022-09-20 PROCEDURE — 90471 IMMUNIZATION ADMIN: CPT | Mod: S$GLB,,, | Performed by: INTERNAL MEDICINE

## 2022-09-20 PROCEDURE — 90686 FLU VACCINE (QUAD) GREATER THAN OR EQUAL TO 3YO PRESERVATIVE FREE IM: ICD-10-PCS | Mod: S$GLB,,, | Performed by: INTERNAL MEDICINE

## 2022-09-20 PROCEDURE — 90686 IIV4 VACC NO PRSV 0.5 ML IM: CPT | Mod: S$GLB,,, | Performed by: INTERNAL MEDICINE

## 2022-09-20 PROCEDURE — 99999 PR PBB SHADOW E&M-EST. PATIENT-LVL III: ICD-10-PCS | Mod: PBBFAC,,, | Performed by: INTERNAL MEDICINE

## 2022-09-20 PROCEDURE — 3008F PR BODY MASS INDEX (BMI) DOCUMENTED: ICD-10-PCS | Mod: CPTII,S$GLB,, | Performed by: INTERNAL MEDICINE

## 2022-09-20 PROCEDURE — 3078F PR MOST RECENT DIASTOLIC BLOOD PRESSURE < 80 MM HG: ICD-10-PCS | Mod: CPTII,S$GLB,, | Performed by: INTERNAL MEDICINE

## 2022-09-20 PROCEDURE — 83036 HEMOGLOBIN GLYCOSYLATED A1C: CPT | Performed by: INTERNAL MEDICINE

## 2022-09-20 PROCEDURE — 3044F HG A1C LEVEL LT 7.0%: CPT | Mod: CPTII,S$GLB,, | Performed by: INTERNAL MEDICINE

## 2022-09-20 PROCEDURE — 3074F SYST BP LT 130 MM HG: CPT | Mod: CPTII,S$GLB,, | Performed by: INTERNAL MEDICINE

## 2022-09-20 PROCEDURE — 3078F DIAST BP <80 MM HG: CPT | Mod: CPTII,S$GLB,, | Performed by: INTERNAL MEDICINE

## 2022-09-20 PROCEDURE — 90471 FLU VACCINE (QUAD) GREATER THAN OR EQUAL TO 3YO PRESERVATIVE FREE IM: ICD-10-PCS | Mod: S$GLB,,, | Performed by: INTERNAL MEDICINE

## 2022-09-20 PROCEDURE — 80053 COMPREHEN METABOLIC PANEL: CPT | Performed by: INTERNAL MEDICINE

## 2022-09-20 PROCEDURE — 3008F BODY MASS INDEX DOCD: CPT | Mod: CPTII,S$GLB,, | Performed by: INTERNAL MEDICINE

## 2022-09-20 RX ORDER — METOPROLOL SUCCINATE 50 MG/1
50 TABLET, EXTENDED RELEASE ORAL DAILY
Qty: 90 TABLET | Refills: 3 | Status: SHIPPED | OUTPATIENT
Start: 2022-09-20 | End: 2023-12-04

## 2022-09-20 RX ORDER — AMLODIPINE BESYLATE 2.5 MG/1
5 TABLET ORAL DAILY
Qty: 180 TABLET | Refills: 3 | Status: SHIPPED | OUTPATIENT
Start: 2022-09-20 | End: 2023-12-04

## 2022-09-20 RX ORDER — ATORVASTATIN CALCIUM 80 MG/1
80 TABLET, FILM COATED ORAL NIGHTLY
Qty: 90 TABLET | Refills: 3 | Status: SHIPPED | OUTPATIENT
Start: 2022-09-20 | End: 2023-12-04

## 2022-09-20 NOTE — PROGRESS NOTES
Subjective:      Patient ID: Hernan Fields is a 62 y.o. male.    Chief Complaint: Executive Health    HPI    It was a pleasure to see you again for your Executive  Health Physical on 2022. .  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  You are a former smoker who quit in the  .  You occasionally drink alcohol.  Your  neurologist is Dr. Mina.  Your cardiologist is Dr. Agustin.     Your current medications include albuterol, aspirin,  Lipitor,  Clonazepam which you take once weekly for public speeking, metoprolol, amlodipine,   Ranexa, Valtrex, and Viagra.     Your have an allergy to iodine and iodine products.     Your past surgical history includes right orbital  fracture surgery, hernia repair, tonsillectomy,  cholecystectomy in May 2013, left heart  catheterization, and prostate biopsy.     Your family history includes a father   with COPD and hypertension.  Your mother is  at  age 53 with a brain aneurysm.  Siblings and children are healthy.      HEALTH MAINTENANCE:  Your last colonoscopy was done on  2013 and you are due for repeat in .  Remember to check with your pharmacy regarding new shingles vaccine. Flu vaccine updated on the day of your executive health exam. Consider new covid vaccine..  Your tetanus vaccine was given in .        Review of Systems   Constitutional:  Negative for chills and fever.   HENT:  Negative for ear pain and sore throat.    Respiratory:  Negative for cough.    Cardiovascular:  Negative for chest pain.   Gastrointestinal:  Negative for abdominal pain and blood in stool.   Genitourinary:   Negative for dysuria and hematuria.   Neurological:  Negative for seizures and syncope.   Objective:   /72   Pulse 66   Temp 96.7 °F (35.9 °C) (Tympanic)   Ht 6' (1.829 m)   Wt 78.9 kg (174 lb)   BMI 23.60 kg/m²     Physical Exam  Constitutional:       General: He is not in acute distress.     Appearance: He is well-developed.   HENT:      Head: Normocephalic and atraumatic.   Eyes:      Extraocular Movements: Extraocular movements intact.   Neck:      Thyroid: No thyromegaly.   Cardiovascular:      Rate and Rhythm: Normal rate and regular rhythm.   Pulmonary:      Breath sounds: Normal breath sounds. No wheezing or rales.   Abdominal:      General: Bowel sounds are normal.      Palpations: Abdomen is soft.      Tenderness: There is no abdominal tenderness.   Musculoskeletal:         General: No swelling.      Cervical back: Neck supple. No rigidity.   Lymphadenopathy:      Cervical: No cervical adenopathy.   Skin:     General: Skin is warm and dry.   Neurological:      Mental Status: He is alert and oriented to person, place, and time.   Psychiatric:         Mood and Affect: Mood normal.         Behavior: Behavior normal.       Assessment:     1. Routine general medical examination at a health care facility    2. Need for influenza vaccination    3. Agatston coronary artery calcium score greater than 400    4. Anxiety    5. AP (angina pectoris)    6. Atypical chest pain    7. Primary hypertension    8. Mixed hyperlipidemia    9. Essential hypertension    10. Coronary artery disease due to lipid rich plaque      Plan:   Routine general medical examination at a health care facility    Need for influenza vaccination  -     Influenza - Quadrivalent *Preferred* (6 months+) (PF)    Agatston coronary artery calcium score greater than 400    Anxiety    AP (angina pectoris)    Atypical chest pain    Primary hypertension    Mixed hyperlipidemia    Essential hypertension  -     amLODIPine (NORVASC) 2.5 MG tablet;  Take 2 tablets (5 mg total) by mouth once daily.  Dispense: 180 tablet; Refill: 3  -     metoprolol succinate (TOPROL-XL) 50 MG 24 hr tablet; Take 1 tablet (50 mg total) by mouth once daily.  Dispense: 90 tablet; Refill: 3    Coronary artery disease due to lipid rich plaque  -     atorvastatin (LIPITOR) 80 MG tablet; Take 1 tablet (80 mg total) by mouth every evening.  Dispense: 90 tablet; Refill: 3    Heart healthy diet, regular exercise, and regular use of sunscreen.   HM reviewed       Lab Frequency Next Occurrence   Ambulatory referral/consult to Urology Once 10/01/2021   Comprehensive Metabolic Panel Once 11/09/2022   Lipid Panel Once 11/09/2022   CRP, High Sensitivity Once 11/09/2022   Testosterone Once 01/18/2023   Prostate Specific Antigen, Diagnostic Once 01/18/2023   CBC Auto Differential Once 01/18/2023   Comprehensive Metabolic Panel Once 01/18/2023       Problem List Items Addressed This Visit       HTN (hypertension) (Chronic)    Relevant Medications    amLODIPine (NORVASC) 2.5 MG tablet    metoprolol succinate (TOPROL-XL) 50 MG 24 hr tablet    Anxiety    Agatston coronary artery calcium score greater than 400    Mixed hyperlipidemia    Coronary artery disease    Relevant Medications    atorvastatin (LIPITOR) 80 MG tablet    Atypical chest pain    AP (angina pectoris)     Other Visit Diagnoses       Routine general medical examination at a health care facility    -  Primary    Need for influenza vaccination        Relevant Orders    Influenza - Quadrivalent *Preferred* (6 months+) (PF) (Completed)    Essential hypertension  (Chronic)       Relevant Medications    amLODIPine (NORVASC) 2.5 MG tablet    metoprolol succinate (TOPROL-XL) 50 MG 24 hr tablet            No follow-ups on file.

## 2022-09-26 NOTE — PROGRESS NOTES
Subjective:      Patient ID: Hernan Fields is a 62 y.o. male.    Chief Complaint: No chief complaint on file.    HPI  Review of Systems  Objective:   There were no vitals taken for this visit.    Physical Exam    Assessment:     No diagnosis found.  Plan:   There are no diagnoses linked to this encounter.    Lab Frequency Next Occurrence   Ambulatory referral/consult to Urology Once 10/01/2021   Comprehensive Metabolic Panel Once 11/09/2022   Lipid Panel Once 11/09/2022   CRP, High Sensitivity Once 11/09/2022   Testosterone Once 01/18/2023   Prostate Specific Antigen, Diagnostic Once 01/18/2023   CBC Auto Differential Once 01/18/2023   Comprehensive Metabolic Panel Once 01/18/2023       Problem List Items Addressed This Visit    None      No follow-ups on file.

## 2022-09-26 NOTE — LETTER
2022    Hernan Fields  [address] [place] [address]             [address]  [address] [address] [place] [address]  Phone: [phone]  Fax: [phone] Dear Mr. Fields:    It was a pleasure to see you again for your Executive  Health Physical on 2022. .  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  You are a former smoker who quit in the  .  You occasionally drink alcohol.  Your  neurologist is Dr. Mina.  Your cardiologist is Dr. Agustin.     Your current medications include albuterol, aspirin,  Lipitor,  Clonazepam which you take once weekly for public speeking, metoprolol, amlodipine,   Ranexa, Valtrex, and Viagra.     Your have an allergy to iodine and iodine products.     Your past surgical history includes right orbital  fracture surgery, hernia repair, tonsillectomy,  cholecystectomy in May 2013, left heart  catheterization, and prostate biopsy.     Your family history includes a father   with COPD and hypertension.  Your mother is  at  age 53 with a brain aneurysm.  Siblings and children are healthy.      HEALTH MAINTENANCE:  Your last colonoscopy was done on  2013 and you are due for repeat in .  Remember to check with your pharmacy regarding new shingles vaccine. Flu vaccine updated on the day of your executive health exam. Consider new covid vaccine..  Your tetanus vaccine was given in .         Vital Signs:  /72   Pulse 66   Temp 96.7 °F (35.9 °C) (Tympanic)   Ht 6' (1.829 m)   Wt 78.9 kg (174 lb)   BMI 23.60 kg/m²     Your physical exam was  normal.     Your lab results were all within acceptable ranges.     Again I would like to thank you for allowing me to participate in your executive health physical.  At this time it appears that you are in good overall health.  It is recommended that you maintain a heart healthy diet, regular exercise, and regular use of sunscreen along with regular checkups.      Please do not hesitate to contact me if you have any questions or concerns or if I can be of further assistance.       Sincerely,      Venkata Daniels MD

## 2022-11-07 ENCOUNTER — LAB VISIT (OUTPATIENT)
Dept: LAB | Facility: HOSPITAL | Age: 62
End: 2022-11-07
Attending: INTERNAL MEDICINE
Payer: COMMERCIAL

## 2022-11-07 DIAGNOSIS — R93.1 AGATSTON CORONARY ARTERY CALCIUM SCORE GREATER THAN 400: ICD-10-CM

## 2022-11-07 DIAGNOSIS — I25.118 CORONARY ARTERY DISEASE OF NATIVE ARTERY OF NATIVE HEART WITH STABLE ANGINA PECTORIS: ICD-10-CM

## 2022-11-07 DIAGNOSIS — E78.2 MIXED HYPERLIPIDEMIA: ICD-10-CM

## 2022-11-07 LAB
ALBUMIN SERPL BCP-MCNC: 4.5 G/DL (ref 3.5–5.2)
ALP SERPL-CCNC: 76 U/L (ref 55–135)
ALT SERPL W/O P-5'-P-CCNC: 24 U/L (ref 10–44)
ANION GAP SERPL CALC-SCNC: 8 MMOL/L (ref 8–16)
AST SERPL-CCNC: 19 U/L (ref 10–40)
BILIRUB SERPL-MCNC: 0.7 MG/DL (ref 0.1–1)
BUN SERPL-MCNC: 17 MG/DL (ref 6–20)
CALCIUM SERPL-MCNC: 10.1 MG/DL (ref 8.7–10.5)
CHLORIDE SERPL-SCNC: 105 MMOL/L (ref 95–110)
CHOLEST SERPL-MCNC: 123 MG/DL (ref 120–199)
CHOLEST/HDLC SERPL: 2.6 {RATIO} (ref 2–5)
CO2 SERPL-SCNC: 29 MMOL/L (ref 23–29)
CREAT SERPL-MCNC: 1 MG/DL (ref 0.5–1.4)
CRP SERPL-MCNC: 0.41 MG/L (ref 0–3.19)
EST. GFR  (NO RACE VARIABLE): >60 ML/MIN/1.73 M^2
GLUCOSE SERPL-MCNC: 92 MG/DL (ref 70–110)
HDLC SERPL-MCNC: 47 MG/DL (ref 40–75)
HDLC SERPL: 38.2 % (ref 20–50)
LDLC SERPL CALC-MCNC: 53.4 MG/DL (ref 63–159)
NONHDLC SERPL-MCNC: 76 MG/DL
POTASSIUM SERPL-SCNC: 4.8 MMOL/L (ref 3.5–5.1)
PROT SERPL-MCNC: 7.4 G/DL (ref 6–8.4)
SODIUM SERPL-SCNC: 142 MMOL/L (ref 136–145)
TRIGL SERPL-MCNC: 113 MG/DL (ref 30–150)

## 2022-11-07 PROCEDURE — 80053 COMPREHEN METABOLIC PANEL: CPT | Performed by: INTERNAL MEDICINE

## 2022-11-07 PROCEDURE — 80061 LIPID PANEL: CPT | Performed by: INTERNAL MEDICINE

## 2022-11-07 PROCEDURE — 86141 C-REACTIVE PROTEIN HS: CPT | Performed by: INTERNAL MEDICINE

## 2022-11-07 PROCEDURE — 36415 COLL VENOUS BLD VENIPUNCTURE: CPT | Performed by: INTERNAL MEDICINE

## 2022-11-17 ENCOUNTER — TELEPHONE (OUTPATIENT)
Dept: CARDIOLOGY | Facility: CLINIC | Age: 62
End: 2022-11-17
Payer: COMMERCIAL

## 2022-11-17 DIAGNOSIS — I10 HYPERTENSION, UNSPECIFIED TYPE: Primary | ICD-10-CM

## 2022-11-28 ENCOUNTER — OFFICE VISIT (OUTPATIENT)
Dept: CARDIOLOGY | Facility: CLINIC | Age: 62
End: 2022-11-28
Payer: COMMERCIAL

## 2022-11-28 ENCOUNTER — HOSPITAL ENCOUNTER (OUTPATIENT)
Dept: CARDIOLOGY | Facility: HOSPITAL | Age: 62
Discharge: HOME | End: 2022-11-28
Attending: INTERNAL MEDICINE
Payer: COMMERCIAL

## 2022-11-28 VITALS
DIASTOLIC BLOOD PRESSURE: 74 MMHG | BODY MASS INDEX: 25.06 KG/M2 | OXYGEN SATURATION: 97 % | HEART RATE: 63 BPM | WEIGHT: 185 LBS | SYSTOLIC BLOOD PRESSURE: 110 MMHG | HEIGHT: 72 IN

## 2022-11-28 DIAGNOSIS — I25.118 CORONARY ARTERY DISEASE OF NATIVE ARTERY OF NATIVE HEART WITH STABLE ANGINA PECTORIS: Primary | ICD-10-CM

## 2022-11-28 DIAGNOSIS — R07.89 ATYPICAL CHEST PAIN: ICD-10-CM

## 2022-11-28 DIAGNOSIS — R93.1 AGATSTON CORONARY ARTERY CALCIUM SCORE GREATER THAN 400: ICD-10-CM

## 2022-11-28 DIAGNOSIS — Z98.61 HISTORY OF PTCA: ICD-10-CM

## 2022-11-28 DIAGNOSIS — R94.31 ABNORMAL ECG: ICD-10-CM

## 2022-11-28 DIAGNOSIS — I10 PRIMARY HYPERTENSION: Chronic | ICD-10-CM

## 2022-11-28 DIAGNOSIS — E78.2 MIXED HYPERLIPIDEMIA: ICD-10-CM

## 2022-11-28 DIAGNOSIS — R00.2 PALPITATIONS: ICD-10-CM

## 2022-11-28 DIAGNOSIS — I10 HYPERTENSION, UNSPECIFIED TYPE: ICD-10-CM

## 2022-11-28 DIAGNOSIS — I20.9 AP (ANGINA PECTORIS): ICD-10-CM

## 2022-11-28 PROCEDURE — 3008F PR BODY MASS INDEX (BMI) DOCUMENTED: ICD-10-PCS | Mod: CPTII,S$GLB,, | Performed by: INTERNAL MEDICINE

## 2022-11-28 PROCEDURE — 1160F PR REVIEW ALL MEDS BY PRESCRIBER/CLIN PHARMACIST DOCUMENTED: ICD-10-PCS | Mod: CPTII,S$GLB,, | Performed by: INTERNAL MEDICINE

## 2022-11-28 PROCEDURE — 1160F RVW MEDS BY RX/DR IN RCRD: CPT | Mod: CPTII,S$GLB,, | Performed by: INTERNAL MEDICINE

## 2022-11-28 PROCEDURE — 99214 PR OFFICE/OUTPT VISIT, EST, LEVL IV, 30-39 MIN: ICD-10-PCS | Mod: S$GLB,,, | Performed by: INTERNAL MEDICINE

## 2022-11-28 PROCEDURE — 3074F PR MOST RECENT SYSTOLIC BLOOD PRESSURE < 130 MM HG: ICD-10-PCS | Mod: CPTII,S$GLB,, | Performed by: INTERNAL MEDICINE

## 2022-11-28 PROCEDURE — 99999 PR PBB SHADOW E&M-EST. PATIENT-LVL III: CPT | Mod: PBBFAC,,, | Performed by: INTERNAL MEDICINE

## 2022-11-28 PROCEDURE — 93010 EKG 12-LEAD: ICD-10-PCS | Mod: ,,, | Performed by: INTERNAL MEDICINE

## 2022-11-28 PROCEDURE — 3008F BODY MASS INDEX DOCD: CPT | Mod: CPTII,S$GLB,, | Performed by: INTERNAL MEDICINE

## 2022-11-28 PROCEDURE — 3078F PR MOST RECENT DIASTOLIC BLOOD PRESSURE < 80 MM HG: ICD-10-PCS | Mod: CPTII,S$GLB,, | Performed by: INTERNAL MEDICINE

## 2022-11-28 PROCEDURE — 3074F SYST BP LT 130 MM HG: CPT | Mod: CPTII,S$GLB,, | Performed by: INTERNAL MEDICINE

## 2022-11-28 PROCEDURE — 99999 PR PBB SHADOW E&M-EST. PATIENT-LVL III: ICD-10-PCS | Mod: PBBFAC,,, | Performed by: INTERNAL MEDICINE

## 2022-11-28 PROCEDURE — 3044F PR MOST RECENT HEMOGLOBIN A1C LEVEL <7.0%: ICD-10-PCS | Mod: CPTII,S$GLB,, | Performed by: INTERNAL MEDICINE

## 2022-11-28 PROCEDURE — 3078F DIAST BP <80 MM HG: CPT | Mod: CPTII,S$GLB,, | Performed by: INTERNAL MEDICINE

## 2022-11-28 PROCEDURE — 1159F PR MEDICATION LIST DOCUMENTED IN MEDICAL RECORD: ICD-10-PCS | Mod: CPTII,S$GLB,, | Performed by: INTERNAL MEDICINE

## 2022-11-28 PROCEDURE — 99214 OFFICE O/P EST MOD 30 MIN: CPT | Mod: S$GLB,,, | Performed by: INTERNAL MEDICINE

## 2022-11-28 PROCEDURE — 3044F HG A1C LEVEL LT 7.0%: CPT | Mod: CPTII,S$GLB,, | Performed by: INTERNAL MEDICINE

## 2022-11-28 PROCEDURE — 93005 ELECTROCARDIOGRAM TRACING: CPT

## 2022-11-28 PROCEDURE — 93010 ELECTROCARDIOGRAM REPORT: CPT | Mod: ,,, | Performed by: INTERNAL MEDICINE

## 2022-11-28 PROCEDURE — 1159F MED LIST DOCD IN RCRD: CPT | Mod: CPTII,S$GLB,, | Performed by: INTERNAL MEDICINE

## 2022-11-28 NOTE — PROGRESS NOTES
Subjective:    Patient ID:  Hernan Fields is a 62 y.o. male who presents for evaluation of Coronary Artery Disease, Hyperlipidemia, and Hypertension        HPIPt presents for eval.  His current medical conditions include CAD, dyslipidemia, HTN.   Nonsmoker.   + family h/o CAD.   Past hx pertinent for following:  H/o abnormal calcium score 1087 in 2014.   H/o  chronic atypical cp sxs  S/p PCI RCA NADINE x 2 June 2016; calcified nonobstructive LAD/left main disease as well, normal LVEF.  Stress echo 9/18 good exercise capacity, no wall motion abnormality, normal LV function.  ecg 10/19/21 NSR, nonspecific st abnl.  Stress echo 9/21 reviewed: good exercise capacity, exercised 11 minutes, no ecg changes, no ischemia on echo images.  Echo normal LV function.  Now here.  Used testosterone patch, had some palpitations, fluttering feeling.  He stopped it a few months ago. No more palpitations.  CAD is stable.  No active angina.  Has h/o atypical cp, and does not seem to be bothering him recently.  Rides bike many miles, feels ok.  No CHF sxs.  BP is good.  Lipids are good on statin tx.  Nl CRP.  Ecg today 11/28/22 NSR, normal ecg.       Past Medical History:   Diagnosis Date    Allergy     Anxiety     public speaking issues.    Atypical chest pain 11/05/2018    Coronary artery disease 12/15/2014    dr alexandra    Elevated PSA     Ex-smoker     one year at 18 y/o    False positive serological test for hepatitis C     H/O: Bell's palsy     affects left side    History of PTCA 09/12/2016    Hypertension     Meningitis     Mixed hyperlipidemia 12/15/2014     Current Outpatient Medications   Medication Instructions    albuterol (PROVENTIL/VENTOLIN HFA) 90 mcg/actuation inhaler 2 puffs, Inhalation, Every 6 hours PRN    amLODIPine (NORVASC) 5 mg, Oral, Daily    aspirin (ECOTRIN) 81 MG EC tablet Take by mouth. 1 Tablet, Delayed Release (E.C.) Oral Every day    atorvastatin (LIPITOR) 80 mg, Oral, Nightly    clonazePAM (KLONOPIN) 0.5 mg,  Oral, 2 times daily PRN    metoprolol succinate (TOPROL-XL) 50 mg, Oral, Daily    ranolazine (RANEXA) 500 MG Tb12 TAKE 1 TABLET BY MOUTH TWICE A DAY    sildenafiL (VIAGRA) 100 MG tablet 1/2 to 1 tab po qd prn erectile dysfunction.    valacyclovir (VALTREX) 500 MG tablet 1 tablet, Oral, Daily         Review of Systems   Constitutional: Negative.   HENT: Negative.     Eyes: Negative.    Cardiovascular:  Positive for palpitations.   Respiratory: Negative.     Endocrine: Negative.    Hematologic/Lymphatic: Negative.    Skin: Negative.    Musculoskeletal: Negative.    Gastrointestinal: Negative.    Genitourinary: Negative.    Neurological: Negative.    Psychiatric/Behavioral: Negative.     Allergic/Immunologic: Negative.         /74   Pulse 63   Ht 6' (1.829 m)   Wt 83.9 kg (185 lb)   SpO2 97%   BMI 25.09 kg/m²     Wt Readings from Last 3 Encounters:   11/28/22 83.9 kg (185 lb)   09/20/22 78.9 kg (174 lb)   07/11/22 81.1 kg (178 lb 12.7 oz)     Temp Readings from Last 3 Encounters:   09/20/22 96.7 °F (35.9 °C) (Tympanic)   02/22/22 98.1 °F (36.7 °C) (Oral)   09/30/21 96.2 °F (35.7 °C) (Tympanic)     BP Readings from Last 3 Encounters:   11/28/22 110/74   09/20/22 108/72   07/11/22 112/70     Pulse Readings from Last 3 Encounters:   11/28/22 63   09/20/22 66   07/11/22 61       Objective:    Physical Exam  Vitals and nursing note reviewed.   Constitutional:       Appearance: He is well-developed.   HENT:      Head: Normocephalic.   Neck:      Thyroid: No thyromegaly.      Vascular: No carotid bruit or JVD.   Cardiovascular:      Rate and Rhythm: Normal rate and regular rhythm.      Pulses:           Radial pulses are 2+ on the right side and 2+ on the left side.      Heart sounds: S1 normal and S2 normal. Heart sounds not distant. No midsystolic click and no opening snap. No murmur heard.    No friction rub. No S3 or S4 sounds.   Pulmonary:      Effort: Pulmonary effort is normal.      Breath sounds: Normal  breath sounds. No wheezing or rales.   Abdominal:      General: Bowel sounds are normal. There is no distension or abdominal bruit.      Palpations: Abdomen is soft.      Tenderness: There is no abdominal tenderness.   Musculoskeletal:      Cervical back: Neck supple.   Skin:     General: Skin is warm.   Neurological:      Mental Status: He is alert and oriented to person, place, and time.   Psychiatric:         Behavior: Behavior normal.       I have reviewed all pertinent labs and cardiac studies.  Component      Latest Ref Rng & Units 11/7/2022   CRP, High Sensitivity      0.00 - 3.19 mg/L 0.41       Chemistry        Component Value Date/Time     11/07/2022 0718    K 4.8 11/07/2022 0718     11/07/2022 0718    CO2 29 11/07/2022 0718    BUN 17 11/07/2022 0718    CREATININE 1.0 11/07/2022 0718    GLU 92 11/07/2022 0718        Component Value Date/Time    CALCIUM 10.1 11/07/2022 0718    ALKPHOS 76 11/07/2022 0718    AST 19 11/07/2022 0718    ALT 24 11/07/2022 0718    BILITOT 0.7 11/07/2022 0718    ESTGFRAFRICA >60.0 06/09/2022 0819    ESTGFRAFRICA >60.0 06/09/2022 0819    EGFRNONAA >60.0 06/09/2022 0819    EGFRNONAA >60.0 06/09/2022 0819        Lab Results   Component Value Date    WBC 5.47 09/20/2022    HGB 15.7 09/20/2022    HCT 46.2 09/20/2022    MCV 97 09/20/2022     09/20/2022       Lab Results   Component Value Date    HGBA1C 5.0 09/20/2022     Lab Results   Component Value Date    CHOL 123 11/07/2022    CHOL 123 09/20/2022    CHOL 116 (L) 04/18/2022     Lab Results   Component Value Date    HDL 47 11/07/2022    HDL 42 09/20/2022    HDL 38 (L) 04/18/2022     Lab Results   Component Value Date    LDLCALC 53.4 (L) 11/07/2022    LDLCALC 55.8 (L) 09/20/2022    LDLCALC 54.2 (L) 04/18/2022     Lab Results   Component Value Date    TRIG 113 11/07/2022    TRIG 126 09/20/2022    TRIG 119 04/18/2022     Lab Results   Component Value Date    CHOLHDL 38.2 11/07/2022    CHOLHDL 34.1 09/20/2022    CHOLHDL  32.8 04/18/2022           Assessment:       1. Coronary artery disease of native artery of native heart with stable angina pectoris    2. Abnormal ECG    3. Agatston coronary artery calcium score greater than 400    4. AP (angina pectoris)    5. Atypical chest pain    6. History of PTCA    7. Primary hypertension    8. Mixed hyperlipidemia    9. Palpitations         Plan:               Stable cardiovascular conditions at present time on current medical treatment.  Pt cautioned about possible adverse serious CV side effects of testosterone replacement tx.  He is encouraged not to use it and has stopped.   Monitor palpitations, seems resolved. Holter if needed in future.  Reviewed all tests and above medical conditions with patient in detail and formulated treatment plan.  Continue optimal medical treatment for cardiovascular conditions.  Cardiac low salt diet advised.  Daily exercise encouraged, with the goal 30 +  minutes aerobic exercise as tolerated.  Maintaining healthy weight and weight loss goals (if needed) were discussed in clinic.  Need for BP control and HTN goals (if needed) were discussed and tx plan formulated.  Importance of optimal lipid control were discussed in detail as well as possible pharmacologic and lifestyle changes that may be needed.  F/u in 6 months w fasting labs.      I have reviewed all pertinent labs and cardiac studies independently. Plans and recommendations have been formulated under my direct supervision. All questions answered and patient voiced understanding.

## 2023-01-09 ENCOUNTER — LAB VISIT (OUTPATIENT)
Dept: LAB | Facility: HOSPITAL | Age: 63
End: 2023-01-09
Attending: UROLOGY
Payer: COMMERCIAL

## 2023-01-09 DIAGNOSIS — E29.1 HYPOGONADISM IN MALE: ICD-10-CM

## 2023-01-09 LAB
ALBUMIN SERPL BCP-MCNC: 4.3 G/DL (ref 3.5–5.2)
ALP SERPL-CCNC: 89 U/L (ref 55–135)
ALT SERPL W/O P-5'-P-CCNC: 32 U/L (ref 10–44)
ANION GAP SERPL CALC-SCNC: 5 MMOL/L (ref 8–16)
AST SERPL-CCNC: 24 U/L (ref 10–40)
BASOPHILS # BLD AUTO: 0.06 K/UL (ref 0–0.2)
BASOPHILS NFR BLD: 1 % (ref 0–1.9)
BILIRUB SERPL-MCNC: 0.8 MG/DL (ref 0.1–1)
BUN SERPL-MCNC: 17 MG/DL (ref 6–20)
CALCIUM SERPL-MCNC: 10.2 MG/DL (ref 8.7–10.5)
CHLORIDE SERPL-SCNC: 103 MMOL/L (ref 95–110)
CO2 SERPL-SCNC: 29 MMOL/L (ref 23–29)
COMPLEXED PSA SERPL-MCNC: 1 NG/ML (ref 0–4)
CREAT SERPL-MCNC: 1.2 MG/DL (ref 0.5–1.4)
DIFFERENTIAL METHOD: ABNORMAL
EOSINOPHIL # BLD AUTO: 0.1 K/UL (ref 0–0.5)
EOSINOPHIL NFR BLD: 1.2 % (ref 0–8)
ERYTHROCYTE [DISTWIDTH] IN BLOOD BY AUTOMATED COUNT: 11.3 % (ref 11.5–14.5)
EST. GFR  (NO RACE VARIABLE): >60 ML/MIN/1.73 M^2
GLUCOSE SERPL-MCNC: 91 MG/DL (ref 70–110)
HCT VFR BLD AUTO: 45.5 % (ref 40–54)
HGB BLD-MCNC: 15.4 G/DL (ref 14–18)
IMM GRANULOCYTES # BLD AUTO: 0.02 K/UL (ref 0–0.04)
IMM GRANULOCYTES NFR BLD AUTO: 0.3 % (ref 0–0.5)
LYMPHOCYTES # BLD AUTO: 1.7 K/UL (ref 1–4.8)
LYMPHOCYTES NFR BLD: 29.7 % (ref 18–48)
MCH RBC QN AUTO: 32.8 PG (ref 27–31)
MCHC RBC AUTO-ENTMCNC: 33.8 G/DL (ref 32–36)
MCV RBC AUTO: 97 FL (ref 82–98)
MONOCYTES # BLD AUTO: 0.4 K/UL (ref 0.3–1)
MONOCYTES NFR BLD: 7.5 % (ref 4–15)
NEUTROPHILS # BLD AUTO: 3.4 K/UL (ref 1.8–7.7)
NEUTROPHILS NFR BLD: 60.3 % (ref 38–73)
NRBC BLD-RTO: 0 /100 WBC
PLATELET # BLD AUTO: 277 K/UL (ref 150–450)
PMV BLD AUTO: 9.8 FL (ref 9.2–12.9)
POTASSIUM SERPL-SCNC: 4.7 MMOL/L (ref 3.5–5.1)
PROT SERPL-MCNC: 7.2 G/DL (ref 6–8.4)
RBC # BLD AUTO: 4.7 M/UL (ref 4.6–6.2)
SODIUM SERPL-SCNC: 137 MMOL/L (ref 136–145)
TESTOST SERPL-MCNC: 232 NG/DL (ref 304–1227)
WBC # BLD AUTO: 5.72 K/UL (ref 3.9–12.7)

## 2023-01-09 PROCEDURE — 84153 ASSAY OF PSA TOTAL: CPT | Performed by: UROLOGY

## 2023-01-09 PROCEDURE — 80053 COMPREHEN METABOLIC PANEL: CPT | Performed by: UROLOGY

## 2023-01-09 PROCEDURE — 85025 COMPLETE CBC W/AUTO DIFF WBC: CPT | Performed by: UROLOGY

## 2023-01-09 PROCEDURE — 84403 ASSAY OF TOTAL TESTOSTERONE: CPT | Performed by: UROLOGY

## 2023-01-09 PROCEDURE — 36415 COLL VENOUS BLD VENIPUNCTURE: CPT | Performed by: UROLOGY

## 2023-04-25 DIAGNOSIS — Z00.00 ROUTINE GENERAL MEDICAL EXAMINATION AT A HEALTH CARE FACILITY: Primary | ICD-10-CM

## 2023-04-30 DIAGNOSIS — I25.10 CORONARY ARTERY DISEASE DUE TO LIPID RICH PLAQUE: ICD-10-CM

## 2023-04-30 DIAGNOSIS — I25.83 CORONARY ARTERY DISEASE DUE TO LIPID RICH PLAQUE: ICD-10-CM

## 2023-05-01 RX ORDER — RANOLAZINE 500 MG/1
TABLET, EXTENDED RELEASE ORAL
Qty: 180 TABLET | Refills: 5 | Status: SHIPPED | OUTPATIENT
Start: 2023-05-01 | End: 2023-05-29

## 2023-05-18 ENCOUNTER — CLINICAL SUPPORT (OUTPATIENT)
Dept: REHABILITATION | Facility: HOSPITAL | Age: 63
End: 2023-05-18
Payer: COMMERCIAL

## 2023-05-18 ENCOUNTER — HOSPITAL ENCOUNTER (OUTPATIENT)
Dept: CARDIOLOGY | Facility: HOSPITAL | Age: 63
Discharge: HOME | End: 2023-05-18
Attending: INTERNAL MEDICINE
Payer: COMMERCIAL

## 2023-05-18 ENCOUNTER — CLINICAL SUPPORT (OUTPATIENT)
Dept: INTERNAL MEDICINE | Facility: CLINIC | Age: 63
End: 2023-05-18
Payer: COMMERCIAL

## 2023-05-18 ENCOUNTER — OFFICE VISIT (OUTPATIENT)
Dept: INTERNAL MEDICINE | Facility: CLINIC | Age: 63
End: 2023-05-18
Payer: COMMERCIAL

## 2023-05-18 ENCOUNTER — CLINICAL SUPPORT (OUTPATIENT)
Dept: AUDIOLOGY | Facility: CLINIC | Age: 63
End: 2023-05-18
Payer: COMMERCIAL

## 2023-05-18 VITALS
HEART RATE: 61 BPM | HEIGHT: 72 IN | DIASTOLIC BLOOD PRESSURE: 69 MMHG | WEIGHT: 180 LBS | TEMPERATURE: 97 F | SYSTOLIC BLOOD PRESSURE: 101 MMHG | BODY MASS INDEX: 24.38 KG/M2

## 2023-05-18 DIAGNOSIS — Z00.00 ROUTINE GENERAL MEDICAL EXAMINATION AT A HEALTH CARE FACILITY: Primary | ICD-10-CM

## 2023-05-18 DIAGNOSIS — Z01.12 HEARING CONSERVATION AND TREATMENT EXAM: Primary | ICD-10-CM

## 2023-05-18 DIAGNOSIS — R93.1 AGATSTON CORONARY ARTERY CALCIUM SCORE GREATER THAN 400: ICD-10-CM

## 2023-05-18 DIAGNOSIS — I10 PRIMARY HYPERTENSION: Chronic | ICD-10-CM

## 2023-05-18 DIAGNOSIS — Z12.11 COLON CANCER SCREENING: ICD-10-CM

## 2023-05-18 DIAGNOSIS — Z00.00 ROUTINE GENERAL MEDICAL EXAMINATION AT A HEALTH CARE FACILITY: ICD-10-CM

## 2023-05-18 DIAGNOSIS — I20.9 AP (ANGINA PECTORIS): ICD-10-CM

## 2023-05-18 DIAGNOSIS — Z98.61 HISTORY OF PTCA: ICD-10-CM

## 2023-05-18 DIAGNOSIS — I25.118 CORONARY ARTERY DISEASE OF NATIVE ARTERY OF NATIVE HEART WITH STABLE ANGINA PECTORIS: ICD-10-CM

## 2023-05-18 LAB
ALBUMIN SERPL BCP-MCNC: 4.4 G/DL (ref 3.5–5.2)
ALP SERPL-CCNC: 74 U/L (ref 55–135)
ALT SERPL W/O P-5'-P-CCNC: 21 U/L (ref 10–44)
ANION GAP SERPL CALC-SCNC: 7 MMOL/L (ref 8–16)
AST SERPL-CCNC: 14 U/L (ref 10–40)
BILIRUB SERPL-MCNC: 0.6 MG/DL (ref 0.1–1)
BUN SERPL-MCNC: 18 MG/DL (ref 6–20)
CALCIUM SERPL-MCNC: 9.6 MG/DL (ref 8.7–10.5)
CHLORIDE SERPL-SCNC: 107 MMOL/L (ref 95–110)
CHOLEST SERPL-MCNC: 96 MG/DL (ref 120–199)
CHOLEST/HDLC SERPL: 2.6 {RATIO} (ref 2–5)
CO2 SERPL-SCNC: 28 MMOL/L (ref 23–29)
COMPLEXED PSA SERPL-MCNC: 0.98 NG/ML (ref 0–4)
CREAT SERPL-MCNC: 1 MG/DL (ref 0.5–1.4)
ERYTHROCYTE [DISTWIDTH] IN BLOOD BY AUTOMATED COUNT: 11.4 % (ref 11.5–14.5)
EST. GFR  (NO RACE VARIABLE): >60 ML/MIN/1.73 M^2
ESTIMATED AVG GLUCOSE: 94 MG/DL (ref 68–131)
GLUCOSE SERPL-MCNC: 98 MG/DL (ref 70–110)
HBA1C MFR BLD: 4.9 % (ref 4–5.6)
HCT VFR BLD AUTO: 42.4 % (ref 40–54)
HDLC SERPL-MCNC: 37 MG/DL (ref 40–75)
HDLC SERPL: 38.5 % (ref 20–50)
HGB BLD-MCNC: 15.1 G/DL (ref 14–18)
LDLC SERPL CALC-MCNC: 46.2 MG/DL (ref 63–159)
MCH RBC QN AUTO: 33.7 PG (ref 27–31)
MCHC RBC AUTO-ENTMCNC: 35.6 G/DL (ref 32–36)
MCV RBC AUTO: 95 FL (ref 82–98)
NONHDLC SERPL-MCNC: 59 MG/DL
PLATELET # BLD AUTO: 299 K/UL (ref 150–450)
PMV BLD AUTO: 10 FL (ref 9.2–12.9)
POTASSIUM SERPL-SCNC: 4.4 MMOL/L (ref 3.5–5.1)
PROT SERPL-MCNC: 7 G/DL (ref 6–8.4)
RBC # BLD AUTO: 4.48 M/UL (ref 4.6–6.2)
SODIUM SERPL-SCNC: 142 MMOL/L (ref 136–145)
TRIGL SERPL-MCNC: 64 MG/DL (ref 30–150)
TSH SERPL DL<=0.005 MIU/L-ACNC: 1.65 UIU/ML (ref 0.4–4)
WBC # BLD AUTO: 4.92 K/UL (ref 3.9–12.7)

## 2023-05-18 PROCEDURE — 93010 ELECTROCARDIOGRAM REPORT: CPT | Mod: ,,, | Performed by: INTERNAL MEDICINE

## 2023-05-18 PROCEDURE — 99999 PR PBB SHADOW E&M-EST. PATIENT-LVL I: ICD-10-PCS | Mod: PBBFAC,,, | Performed by: AUDIOLOGIST

## 2023-05-18 PROCEDURE — 97750 PHYSICAL PERFORMANCE TEST: CPT | Performed by: PHYSICAL THERAPIST

## 2023-05-18 PROCEDURE — 92552 PURE TONE AUDIOMETRY AIR: CPT | Mod: S$GLB,,, | Performed by: AUDIOLOGIST

## 2023-05-18 PROCEDURE — 3074F SYST BP LT 130 MM HG: CPT | Mod: CPTII,S$GLB,, | Performed by: INTERNAL MEDICINE

## 2023-05-18 PROCEDURE — 1159F PR MEDICATION LIST DOCUMENTED IN MEDICAL RECORD: ICD-10-PCS | Mod: CPTII,S$GLB,, | Performed by: INTERNAL MEDICINE

## 2023-05-18 PROCEDURE — 83036 HEMOGLOBIN GLYCOSYLATED A1C: CPT | Performed by: INTERNAL MEDICINE

## 2023-05-18 PROCEDURE — 99999 PR PBB SHADOW E&M-EST. PATIENT-LVL III: CPT | Mod: PBBFAC,,, | Performed by: INTERNAL MEDICINE

## 2023-05-18 PROCEDURE — 85027 COMPLETE CBC AUTOMATED: CPT | Performed by: INTERNAL MEDICINE

## 2023-05-18 PROCEDURE — 93010 EKG 12-LEAD: ICD-10-PCS | Mod: ,,, | Performed by: INTERNAL MEDICINE

## 2023-05-18 PROCEDURE — 84153 ASSAY OF PSA TOTAL: CPT | Performed by: INTERNAL MEDICINE

## 2023-05-18 PROCEDURE — 92552 PR PURE TONE AUDIOMETRY, AIR: ICD-10-PCS | Mod: S$GLB,,, | Performed by: AUDIOLOGIST

## 2023-05-18 PROCEDURE — 3078F PR MOST RECENT DIASTOLIC BLOOD PRESSURE < 80 MM HG: ICD-10-PCS | Mod: CPTII,S$GLB,, | Performed by: INTERNAL MEDICINE

## 2023-05-18 PROCEDURE — 99396 PREV VISIT EST AGE 40-64: CPT | Mod: S$GLB,,, | Performed by: INTERNAL MEDICINE

## 2023-05-18 PROCEDURE — 80061 LIPID PANEL: CPT | Performed by: INTERNAL MEDICINE

## 2023-05-18 PROCEDURE — 93005 ELECTROCARDIOGRAM TRACING: CPT

## 2023-05-18 PROCEDURE — 99396 PR PREVENTIVE VISIT,EST,40-64: ICD-10-PCS | Mod: S$GLB,,, | Performed by: INTERNAL MEDICINE

## 2023-05-18 PROCEDURE — 84443 ASSAY THYROID STIM HORMONE: CPT | Performed by: INTERNAL MEDICINE

## 2023-05-18 PROCEDURE — 99999 PR PBB SHADOW E&M-EST. PATIENT-LVL III: ICD-10-PCS | Mod: PBBFAC,,, | Performed by: INTERNAL MEDICINE

## 2023-05-18 PROCEDURE — 80053 COMPREHEN METABOLIC PANEL: CPT | Performed by: INTERNAL MEDICINE

## 2023-05-18 PROCEDURE — 3008F PR BODY MASS INDEX (BMI) DOCUMENTED: ICD-10-PCS | Mod: CPTII,S$GLB,, | Performed by: INTERNAL MEDICINE

## 2023-05-18 PROCEDURE — 99999 PR PBB SHADOW E&M-EST. PATIENT-LVL I: CPT | Mod: PBBFAC,,, | Performed by: AUDIOLOGIST

## 2023-05-18 PROCEDURE — 3074F PR MOST RECENT SYSTOLIC BLOOD PRESSURE < 130 MM HG: ICD-10-PCS | Mod: CPTII,S$GLB,, | Performed by: INTERNAL MEDICINE

## 2023-05-18 PROCEDURE — 3008F BODY MASS INDEX DOCD: CPT | Mod: CPTII,S$GLB,, | Performed by: INTERNAL MEDICINE

## 2023-05-18 PROCEDURE — 3078F DIAST BP <80 MM HG: CPT | Mod: CPTII,S$GLB,, | Performed by: INTERNAL MEDICINE

## 2023-05-18 PROCEDURE — 1159F MED LIST DOCD IN RCRD: CPT | Mod: CPTII,S$GLB,, | Performed by: INTERNAL MEDICINE

## 2023-05-18 NOTE — PROGRESS NOTES
Subjective:      Patient ID: Hernan Fields is a 62 y.o. male.    Chief Complaint: No chief complaint on file.    HPI      It was a pleasure to see you again for your Executive  Health Physical on 23 .  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  You are a former smoker who quit in the  .  You occasionally drink alcohol.  Your  neurologist is Dr. Mina.  Your cardiologist is Dr. Agustin.     Your current medications include albuterol, aspirin,  Lipitor,  Clonazepam which you take once weekly for public speeking, metoprolol, amlodipine,   Ranexa, Valtrex, and Viagra.     Your have an allergy to iodine and iodine products.     Your past surgical history includes right orbital  fracture surgery, hernia repair, tonsillectomy,  cholecystectomy in May 2013, left heart  catheterization, and prostate biopsy.     Your family history includes a father   with COPD and hypertension.  Your mother is  at  age 53 with a brain aneurysm.  Siblings - heart flutters   children are healthy.      HEALTH MAINTENANCE:  Your last colonoscopy was done on  2013 and you are due for repeat in .  Remember to check with your pharmacy regarding new shingles vaccine. remember to get your flu vaccine in the fall.   Your tetanus vaccine was given in .       Review of Systems   Constitutional:  Negative for chills and fever.   HENT:  Negative for ear pain and sore throat.    Respiratory:  Negative for cough.    Cardiovascular:  Negative for chest pain.   Gastrointestinal:  Negative for abdominal pain and blood in stool.   Genitourinary:  Negative for dysuria and  hematuria.   Neurological:  Negative for seizures and syncope.   Objective:   /69 (BP Location: Right arm, Patient Position: Sitting)   Pulse 61   Temp 97.3 °F (36.3 °C)   Ht 6' (1.829 m)   Wt 81.6 kg (180 lb)   BMI 24.41 kg/m²     Physical Exam  Constitutional:       General: He is not in acute distress.     Appearance: He is well-developed.   HENT:      Head: Normocephalic and atraumatic.   Eyes:      Pupils: Pupils are equal, round, and reactive to light.   Neck:      Thyroid: No thyromegaly.      Vascular: No carotid bruit.   Cardiovascular:      Rate and Rhythm: Normal rate and regular rhythm.   Pulmonary:      Breath sounds: Normal breath sounds. No wheezing or rales.   Abdominal:      General: Bowel sounds are normal.      Palpations: Abdomen is soft.      Tenderness: There is no abdominal tenderness.   Musculoskeletal:      Cervical back: Neck supple.   Lymphadenopathy:      Cervical: No cervical adenopathy.   Skin:     General: Skin is warm and dry.   Neurological:      Mental Status: He is alert and oriented to person, place, and time.   Psychiatric:         Behavior: Behavior normal.       Lab Results   Component Value Date    WBC 4.92 05/18/2023    HGB 15.1 05/18/2023    HGB 15.4 01/09/2023    HGB 15.7 09/20/2022    HCT 42.4 05/18/2023    MCV 95 05/18/2023    MCV 97 01/09/2023    MCV 97 09/20/2022     05/18/2023    CHOL 96 (L) 05/18/2023    TRIG 64 05/18/2023    HDL 37 (L) 05/18/2023    LDLCALC 46.2 (L) 05/18/2023    LDLCALC 53.4 (L) 11/07/2022    LDLCALC 55.8 (L) 09/20/2022    ALT 21 05/18/2023    AST 14 05/18/2023     05/18/2023    K 4.4 05/18/2023    CALCIUM 9.6 05/18/2023     05/18/2023    CO2 28 05/18/2023    BUN 18 05/18/2023    CREATININE 1.0 05/18/2023    CREATININE 1.2 01/09/2023    CREATININE 1.0 11/07/2022    EGFRNORACEVR >60 05/18/2023    EGFRNORACEVR >60.0 01/09/2023    EGFRNORACEVR >60.0 11/07/2022    TSH 1.603 09/20/2022    TSH 1.249 09/30/2021    TSH 1.458  08/19/2020    PSA 1.1 09/20/2022    PSA 1.2 09/30/2021    PSA 1.0 08/19/2020    PSADIAG 1.0 01/09/2023    PSADIAG 1.2 06/09/2022    PSADIAG 1.0 01/20/2022    GLU 98 05/18/2023    HGBA1C 5.0 09/20/2022    HGBA1C 5.1 09/30/2021    HGBA1C 5.0 08/19/2020    UIBANNLT08PM 44 09/30/2021    UADPOXJA63SI 38 08/19/2020    TOTALTESTOST 232 (L) 01/09/2023    TOTALTESTOST 342 06/09/2022    TOTALTESTOST 245 (L) 01/20/2022          The ASCVD Risk score (Joe PLEITEZ, et al., 2019) failed to calculate for the following reasons:    The valid total cholesterol range is 130 to 320 mg/dL     Assessment:     1. Routine general medical examination at a health care facility    2. Colon cancer screening    3. Agatston coronary artery calcium score greater than 400    4. Primary hypertension    5. Coronary artery disease of native artery of native heart with stable angina pectoris    6. History of PTCA    7. AP (angina pectoris)      Plan:   1. Routine general medical examination at a health care facility    2. Colon cancer screening  -     Ambulatory referral/consult to Endo Procedure ; Future; Expected date: 06/08/2023    3. Agatston coronary artery calcium score greater than 400    4. Primary hypertension    5. Coronary artery disease of native artery of native heart with stable angina pectoris    6. History of PTCA    7. AP (angina pectoris)      Above conditions stable.  Heart healthy diet, regular exercise, and regular use of sunscreen.   HM reviewed  See executive Health letter for details.  Continue follow-up with Cardiology.  There are no Patient Instructions on file for this visit.    Future Appointments   Date Time Provider Department Center   5/24/2023 11:45 AM PRE-ADMIT, ENDO -[facility] Kenna Mack   5/29/2023  9:20 AM Umer Agustin MD [facility] [place]       Lab Frequency Next Occurrence   Comprehensive Metabolic Panel Once 05/28/2023   Lipid Panel Once 05/28/2023   CRP, High Sensitivity Once  05/28/2023       No follow-ups on file.

## 2023-05-18 NOTE — PROGRESS NOTES
Executive Health Screening    Hernan Fields was seen 05/18/2023 for an [facility] hearing screen.      Results reveal a mild-to-moderate hearing loss 7464-3013 Hz for the right ear, and  mild hearing loss 5424-9909 & 8000 Hz for the left ear.     Otoscopy was unremarkable.    Patient was counseled on the above findings.     Recommendations include:    1.  Complete Audiological Evaluation  2.  ENT followup  3.  Possible Hearing aid consult pending candidacy   4.  Wear hearing protective devices around loud noise  5.  Annual audiograms

## 2023-05-19 NOTE — PROGRESS NOTES
:  1960    DX: Z00.0  Orders:  Fitness Evaluation    An [facility] Fitness Component evaluation was completed.  Results are as follows:    Height (in):    72                            Weight (lbs):    180                                    BMI:   24.5    Resting Energy Expenditure:         1670       kcal/24 hrs.                             Body Composition:          25.77  % body fat             Rating: Good     Waist to Hip Ratio:     0.88                    Risk:  Low Risk    Hip taken over clothing:      Yes          Muscular Strength and Endurance Assessment:               Strength (lbs):     Right: 111.3             Rating:  Above Average     Left:  91.7           Rating: Average    Push-ups:   20                 Rating:  Excellent    Curl-ups :   NT                Rating:  NT    Flexibility Testing:   Sit and Reach (cm):    19.75                       Rating:  Good     Assessment: Patient rides his bike and walks for heart health.  He does minimal weight training.      Date 2023 3/3/2017   Height (in): 72 73   Weight: 180 183   BMI: 24.46 24.19   Body Fat %: 25.77 19.00   Waist (cm): 92 89   Hip (cm): 105 104   WHR: 0.88 0.86   RBP: 101/69 128/80   RHR: 61 72    Rt (lbs): 111 128    Lt (lbs): 92 108   Push-up  20 NT   Curl-up  Not Tested  NT   Flexibility  20 20   REE  (Kcals) 1670 1530   Final Chart input copied from results page on Excel    The patient completed the testing procedures without complications.

## 2023-05-24 ENCOUNTER — HOSPITAL ENCOUNTER (OUTPATIENT)
Dept: PREADMISSION TESTING | Facility: HOSPITAL | Age: 63
Discharge: HOME | End: 2023-05-24
Attending: INTERNAL MEDICINE
Payer: COMMERCIAL

## 2023-05-24 DIAGNOSIS — Z12.11 COLON CANCER SCREENING: Primary | ICD-10-CM

## 2023-05-24 RX ORDER — SODIUM, POTASSIUM,MAG SULFATES 17.5-3.13G
1 SOLUTION, RECONSTITUTED, ORAL ORAL DAILY
Qty: 1 KIT | Refills: 0 | Status: SHIPPED | OUTPATIENT
Start: 2023-05-24 | End: 2023-05-26

## 2023-05-26 ENCOUNTER — TELEPHONE (OUTPATIENT)
Dept: CARDIOLOGY | Facility: CLINIC | Age: 63
End: 2023-05-26
Payer: COMMERCIAL

## 2023-05-29 ENCOUNTER — OFFICE VISIT (OUTPATIENT)
Dept: CARDIOLOGY | Facility: CLINIC | Age: 63
End: 2023-05-29
Payer: COMMERCIAL

## 2023-05-29 VITALS
OXYGEN SATURATION: 98 % | WEIGHT: 186.06 LBS | BODY MASS INDEX: 25.2 KG/M2 | HEIGHT: 72 IN | SYSTOLIC BLOOD PRESSURE: 100 MMHG | HEART RATE: 70 BPM | DIASTOLIC BLOOD PRESSURE: 60 MMHG

## 2023-05-29 DIAGNOSIS — I10 PRIMARY HYPERTENSION: Chronic | ICD-10-CM

## 2023-05-29 DIAGNOSIS — I25.83 CORONARY ARTERY DISEASE DUE TO LIPID RICH PLAQUE: ICD-10-CM

## 2023-05-29 DIAGNOSIS — R94.31 ABNORMAL ECG: ICD-10-CM

## 2023-05-29 DIAGNOSIS — Z98.61 HISTORY OF PTCA: ICD-10-CM

## 2023-05-29 DIAGNOSIS — R00.2 PALPITATIONS: ICD-10-CM

## 2023-05-29 DIAGNOSIS — E78.2 MIXED HYPERLIPIDEMIA: ICD-10-CM

## 2023-05-29 DIAGNOSIS — I25.118 CORONARY ARTERY DISEASE OF NATIVE ARTERY OF NATIVE HEART WITH STABLE ANGINA PECTORIS: ICD-10-CM

## 2023-05-29 DIAGNOSIS — I25.10 CORONARY ARTERY DISEASE DUE TO LIPID RICH PLAQUE: ICD-10-CM

## 2023-05-29 DIAGNOSIS — R93.1 AGATSTON CORONARY ARTERY CALCIUM SCORE GREATER THAN 400: ICD-10-CM

## 2023-05-29 DIAGNOSIS — R07.89 ATYPICAL CHEST PAIN: ICD-10-CM

## 2023-05-29 DIAGNOSIS — I20.9 AP (ANGINA PECTORIS): Primary | ICD-10-CM

## 2023-05-29 PROCEDURE — 1159F MED LIST DOCD IN RCRD: CPT | Mod: CPTII,S$GLB,, | Performed by: INTERNAL MEDICINE

## 2023-05-29 PROCEDURE — 99999 PR PBB SHADOW E&M-EST. PATIENT-LVL III: CPT | Mod: PBBFAC,,, | Performed by: INTERNAL MEDICINE

## 2023-05-29 PROCEDURE — 99215 OFFICE O/P EST HI 40 MIN: CPT | Mod: S$GLB,,, | Performed by: INTERNAL MEDICINE

## 2023-05-29 PROCEDURE — 3074F PR MOST RECENT SYSTOLIC BLOOD PRESSURE < 130 MM HG: ICD-10-PCS | Mod: CPTII,S$GLB,, | Performed by: INTERNAL MEDICINE

## 2023-05-29 PROCEDURE — 3044F PR MOST RECENT HEMOGLOBIN A1C LEVEL <7.0%: ICD-10-PCS | Mod: CPTII,S$GLB,, | Performed by: INTERNAL MEDICINE

## 2023-05-29 PROCEDURE — 99999 PR PBB SHADOW E&M-EST. PATIENT-LVL III: ICD-10-PCS | Mod: PBBFAC,,, | Performed by: INTERNAL MEDICINE

## 2023-05-29 PROCEDURE — 3078F PR MOST RECENT DIASTOLIC BLOOD PRESSURE < 80 MM HG: ICD-10-PCS | Mod: CPTII,S$GLB,, | Performed by: INTERNAL MEDICINE

## 2023-05-29 PROCEDURE — 1160F PR REVIEW ALL MEDS BY PRESCRIBER/CLIN PHARMACIST DOCUMENTED: ICD-10-PCS | Mod: CPTII,S$GLB,, | Performed by: INTERNAL MEDICINE

## 2023-05-29 PROCEDURE — 3008F BODY MASS INDEX DOCD: CPT | Mod: CPTII,S$GLB,, | Performed by: INTERNAL MEDICINE

## 2023-05-29 PROCEDURE — 1160F RVW MEDS BY RX/DR IN RCRD: CPT | Mod: CPTII,S$GLB,, | Performed by: INTERNAL MEDICINE

## 2023-05-29 PROCEDURE — 3044F HG A1C LEVEL LT 7.0%: CPT | Mod: CPTII,S$GLB,, | Performed by: INTERNAL MEDICINE

## 2023-05-29 PROCEDURE — 3074F SYST BP LT 130 MM HG: CPT | Mod: CPTII,S$GLB,, | Performed by: INTERNAL MEDICINE

## 2023-05-29 PROCEDURE — 3078F DIAST BP <80 MM HG: CPT | Mod: CPTII,S$GLB,, | Performed by: INTERNAL MEDICINE

## 2023-05-29 PROCEDURE — 99215 PR OFFICE/OUTPT VISIT, EST, LEVL V, 40-54 MIN: ICD-10-PCS | Mod: S$GLB,,, | Performed by: INTERNAL MEDICINE

## 2023-05-29 PROCEDURE — 1159F PR MEDICATION LIST DOCUMENTED IN MEDICAL RECORD: ICD-10-PCS | Mod: CPTII,S$GLB,, | Performed by: INTERNAL MEDICINE

## 2023-05-29 PROCEDURE — 3008F PR BODY MASS INDEX (BMI) DOCUMENTED: ICD-10-PCS | Mod: CPTII,S$GLB,, | Performed by: INTERNAL MEDICINE

## 2023-05-29 RX ORDER — RANOLAZINE 500 MG/1
TABLET, EXTENDED RELEASE ORAL
Qty: 180 TABLET | Refills: 5
Start: 2023-05-29 | End: 2023-08-03

## 2023-05-29 RX ORDER — NITROGLYCERIN 0.4 MG/1
0.4 TABLET SUBLINGUAL EVERY 5 MIN PRN
Qty: 20 TABLET | Refills: 12 | Status: SHIPPED | OUTPATIENT
Start: 2023-05-29 | End: 2024-05-28

## 2023-05-29 NOTE — PROGRESS NOTES
"Subjective:    Patient ID:  Hernan Fields is a 62 y.o. male who presents for evaluation of Hyperlipidemia, Hypertension, Coronary Artery Disease, Risk Factor Management For Atherosclerosis, and Chest Pain        HPIPt presents for eval.  His current medical conditions include CAD, dyslipidemia, HTN.   Nonsmoker.   + family h/o CAD.   Past hx pertinent for following:  H/o abnormal calcium score 1087 in 2014.   H/o  chronic atypical cp sxs  S/p PCI RCA NADINE x 2 June 2016; calcified nonobstructive LAD/left main disease as well, normal LVEF.  Stress echo 9/18 good exercise capacity, no wall motion abnormality, normal LV function.  ecg 10/19/21 NSR, nonspecific st abnl.  Stress echo 9/21 reviewed: good exercise capacity, exercised 11 minutes, no ecg changes, no ischemia on echo images.  Echo normal LV function.  Ecg 11/28/22 NSR, normal ecg.   Now here.  Has noted more CP since last visit.  Can be at rest, or sometimes with activity.  Last " a second", can be at random.  Can be daily.  ? Fluttering or palpitations.   Has chronic atypical CP, stable.  No acute sxs.  No acute CHF sxs.   Lipids LDL well controlled.  HDL on low side.  BP is controlled on current med tx.  On low side today, asx.  Exercises.    Past Medical History:   Diagnosis Date    Allergy     Anxiety     public speaking issues.    Atypical chest pain 11/05/2018    Coronary artery disease 12/15/2014    dr alexandra    Elevated PSA     Ex-smoker     one year at 18 y/o    False positive serological test for hepatitis C     H/O: Bell's palsy     affects left side    History of PTCA 09/12/2016    Hypertension     Meningitis     Mixed hyperlipidemia 12/15/2014     Current Outpatient Medications   Medication Instructions    albuterol (PROVENTIL/VENTOLIN HFA) 90 mcg/actuation inhaler 2 puffs, Inhalation, Every 6 hours PRN    amLODIPine (NORVASC) 5 mg, Oral, Daily    aspirin (ECOTRIN) 81 MG EC tablet Take by mouth. 1 Tablet, Delayed Release (E.C.) Oral Every day    " atorvastatin (LIPITOR) 80 mg, Oral, Nightly    clonazePAM (KLONOPIN) 0.5 mg, Oral, 2 times daily PRN    metoprolol succinate (TOPROL-XL) 50 mg, Oral, Daily    nitroGLYCERIN (NITROSTAT) 0.4 mg, Sublingual, Every 5 min PRN    ranolazine (RANEXA) 500 MG Tb12 Take 2 pills in am and 2 pills in pm    sildenafiL (VIAGRA) 100 MG tablet 1/2 to 1 tab po qd prn erectile dysfunction.    valacyclovir (VALTREX) 500 MG tablet 1 tablet, Oral, Daily         Review of Systems   Constitutional: Negative.   HENT: Negative.     Eyes: Negative.    Cardiovascular:  Positive for chest pain and palpitations.   Respiratory: Negative.     Endocrine: Negative.    Hematologic/Lymphatic: Negative.    Skin: Negative.    Musculoskeletal: Negative.    Gastrointestinal: Negative.    Genitourinary: Negative.    Neurological: Negative.    Psychiatric/Behavioral: Negative.     Allergic/Immunologic: Negative.         /60   Pulse 70   Ht 6' (1.829 m)   Wt 84.4 kg (186 lb 1.1 oz)   SpO2 98%   BMI 25.24 kg/m²     Wt Readings from Last 3 Encounters:   05/29/23 84.4 kg (186 lb 1.1 oz)   05/18/23 81.6 kg (180 lb)   11/28/22 83.9 kg (185 lb)     Temp Readings from Last 3 Encounters:   05/18/23 97.3 °F (36.3 °C)   09/20/22 96.7 °F (35.9 °C) (Tympanic)   02/22/22 98.1 °F (36.7 °C) (Oral)     BP Readings from Last 3 Encounters:   05/29/23 100/60   05/18/23 101/69   11/28/22 110/74     Pulse Readings from Last 3 Encounters:   05/29/23 70   05/18/23 61   11/28/22 63       Objective:    Physical Exam  Vitals and nursing note reviewed.   Constitutional:       Appearance: He is well-developed.   HENT:      Head: Normocephalic.   Neck:      Thyroid: No thyromegaly.      Vascular: No carotid bruit or JVD.   Cardiovascular:      Rate and Rhythm: Normal rate and regular rhythm.      Pulses:           Radial pulses are 2+ on the right side and 2+ on the left side.      Heart sounds: S1 normal and S2 normal. Heart sounds not distant. No midsystolic click and no  opening snap. No murmur heard.    No friction rub. No S3 or S4 sounds.   Pulmonary:      Effort: Pulmonary effort is normal.      Breath sounds: Normal breath sounds. No wheezing or rales.   Abdominal:      General: Bowel sounds are normal. There is no distension or abdominal bruit.      Palpations: Abdomen is soft.      Tenderness: There is no abdominal tenderness.   Musculoskeletal:      Cervical back: Neck supple.   Skin:     General: Skin is warm.   Neurological:      Mental Status: He is alert and oriented to person, place, and time.   Psychiatric:         Behavior: Behavior normal.       I have reviewed all pertinent labs and cardiac studies.  Component      Latest Ref Rng & Units 11/7/2022   CRP, High Sensitivity      0.00 - 3.19 mg/L 0.41       Chemistry        Component Value Date/Time     05/18/2023 0807    K 4.4 05/18/2023 0807     05/18/2023 0807    CO2 28 05/18/2023 0807    BUN 18 05/18/2023 0807    CREATININE 1.0 05/18/2023 0807    GLU 98 05/18/2023 0807        Component Value Date/Time    CALCIUM 9.6 05/18/2023 0807    ALKPHOS 74 05/18/2023 0807    AST 14 05/18/2023 0807    ALT 21 05/18/2023 0807    BILITOT 0.6 05/18/2023 0807    ESTGFRAFRICA >60.0 06/09/2022 0819    ESTGFRAFRICA >60.0 06/09/2022 0819    EGFRNONAA >60.0 06/09/2022 0819    EGFRNONAA >60.0 06/09/2022 0819        Lab Results   Component Value Date    WBC 4.92 05/18/2023    HGB 15.1 05/18/2023    HCT 42.4 05/18/2023    MCV 95 05/18/2023     05/18/2023       Lab Results   Component Value Date    HGBA1C 4.9 05/18/2023     Lab Results   Component Value Date    CHOL 96 (L) 05/18/2023    CHOL 123 11/07/2022    CHOL 123 09/20/2022     Lab Results   Component Value Date    HDL 37 (L) 05/18/2023    HDL 47 11/07/2022    HDL 42 09/20/2022     Lab Results   Component Value Date    LDLCALC 46.2 (L) 05/18/2023    LDLCALC 53.4 (L) 11/07/2022    LDLCALC 55.8 (L) 09/20/2022     Lab Results   Component Value Date    TRIG 64 05/18/2023     TRIG 113 11/07/2022    TRIG 126 09/20/2022     Lab Results   Component Value Date    CHOLHDL 38.5 05/18/2023    CHOLHDL 38.2 11/07/2022    CHOLHDL 34.1 09/20/2022           Assessment:       1. AP (angina pectoris)    2. Abnormal ECG    3. Agatston coronary artery calcium score greater than 400    4. Atypical chest pain    5. Coronary artery disease of native artery of native heart with stable angina pectoris    6. History of PTCA    7. Primary hypertension    8. Palpitations    9. Mixed hyperlipidemia    10. Coronary artery disease due to lipid rich plaque         Plan:             Increased CP sxs but very short lasting and atypical overall.  Needs ischemia evaluation.  Stress MPI.  Echocardiogram.  Double up Ranexa to 1000 mg bid.  Discussed LHC if stress test/echo reveals worsening coronary ischemia.  Indications for LHC, possible PCI discussed.  Sublingual nitroglycerin 0.4 mg for concerning chest pain episodes or breakthrough angina.  Patient advised of indications, side effects of nitroglycerin and when to report to ER.  Palpitations: schedule 1 week Vital Holter asap.  Reviewed all tests and above medical conditions with patient in detail and formulated treatment plan.  Continue optimal medical treatment for cardiovascular conditions.  Cardiac low salt diet advised.  Daily exercise encouraged, with the goal 30 +  minutes aerobic exercise as tolerated.  Maintaining healthy weight and weight loss goals (if needed) were discussed in clinic.  HTN: Need for BP control and HTN goals (if needed) were discussed and tx plan formulated.  Goal < 130/80.  Continue current HTN meds.  Lipids:Importance of optimal lipid control were discussed in detail as well as possible pharmacologic and lifestyle changes that may be needed.  Continue statin tx.  F/u asap after tests to review and make further tx decisions accordingly.    I have reviewed all pertinent labs and cardiac studies independently. Plans and recommendations  have been formulated under my direct supervision. All questions answered and patient voiced understanding.

## 2023-05-31 ENCOUNTER — HOSPITAL ENCOUNTER (OUTPATIENT)
Dept: CARDIOLOGY | Facility: HOSPITAL | Age: 63
Discharge: HOME | End: 2023-05-31
Attending: INTERNAL MEDICINE
Payer: COMMERCIAL

## 2023-05-31 VITALS — BODY MASS INDEX: 25.19 KG/M2 | HEIGHT: 72 IN | WEIGHT: 186 LBS

## 2023-05-31 DIAGNOSIS — Z98.61 HISTORY OF PTCA: ICD-10-CM

## 2023-05-31 DIAGNOSIS — R00.2 PALPITATIONS: ICD-10-CM

## 2023-05-31 DIAGNOSIS — I20.9 AP (ANGINA PECTORIS): ICD-10-CM

## 2023-05-31 DIAGNOSIS — R07.89 ATYPICAL CHEST PAIN: ICD-10-CM

## 2023-05-31 DIAGNOSIS — I25.118 CORONARY ARTERY DISEASE OF NATIVE ARTERY OF NATIVE HEART WITH STABLE ANGINA PECTORIS: ICD-10-CM

## 2023-05-31 DIAGNOSIS — R94.31 ABNORMAL ECG: ICD-10-CM

## 2023-05-31 DIAGNOSIS — R93.1 AGATSTON CORONARY ARTERY CALCIUM SCORE GREATER THAN 400: ICD-10-CM

## 2023-05-31 DIAGNOSIS — I10 PRIMARY HYPERTENSION: ICD-10-CM

## 2023-05-31 LAB
AORTIC ROOT ANNULUS: 3.65 CM
AV INDEX (PROSTH): 0.92
AV MEAN GRADIENT: 4 MMHG
AV PEAK GRADIENT: 5 MMHG
AV REGURGITATION PRESSURE HALF TIME: 934.94 MS
AV VALVE AREA: 3 CM2
AV VELOCITY RATIO: 0.85
BSA FOR ECHO PROCEDURE: 2.07 M2
CV ECHO LV RWT: 0.46 CM
DOP CALC AO PEAK VEL: 1.17 M/S
DOP CALC AO VTI: 27.1 CM
DOP CALC LVOT AREA: 3.3 CM2
DOP CALC LVOT DIAMETER: 2.04 CM
DOP CALC LVOT PEAK VEL: 0.99 M/S
DOP CALC LVOT STROKE VOLUME: 81.34 CM3
DOP CALC RVOT PEAK VEL: 0.77 M/S
DOP CALC RVOT VTI: 18.9 CM
DOP CALCLVOT PEAK VEL VTI: 24.9 CM
E WAVE DECELERATION TIME: 206.13 MSEC
E/A RATIO: 1.29
E/E' RATIO: 6.4 M/S
ECHO LV POSTERIOR WALL: 0.98 CM (ref 0.6–1.1)
EJECTION FRACTION: 65 %
FRACTIONAL SHORTENING: 32 % (ref 28–44)
INTERVENTRICULAR SEPTUM: 1.19 CM (ref 0.6–1.1)
IVRT: 102.76 MSEC
LA MAJOR: 4.3 CM
LA MINOR: 4.2 CM
LA WIDTH: 2.9 CM
LEFT ATRIUM SIZE: 2.91 CM
LEFT ATRIUM VOLUME INDEX MOD: 17.1 ML/M2
LEFT ATRIUM VOLUME INDEX: 14.7 ML/M2
LEFT ATRIUM VOLUME MOD: 35.32 CM3
LEFT ATRIUM VOLUME: 30.48 CM3
LEFT INTERNAL DIMENSION IN SYSTOLE: 2.92 CM (ref 2.1–4)
LEFT VENTRICLE DIASTOLIC VOLUME INDEX: 40.16 ML/M2
LEFT VENTRICLE DIASTOLIC VOLUME: 83.13 ML
LEFT VENTRICLE MASS INDEX: 77 G/M2
LEFT VENTRICLE SYSTOLIC VOLUME INDEX: 15.9 ML/M2
LEFT VENTRICLE SYSTOLIC VOLUME: 32.81 ML
LEFT VENTRICULAR INTERNAL DIMENSION IN DIASTOLE: 4.3 CM (ref 3.5–6)
LEFT VENTRICULAR MASS: 159.79 G
LV LATERAL E/E' RATIO: 5.71 M/S
LV SEPTAL E/E' RATIO: 7.27 M/S
LVOT MG: 1.87 MMHG
LVOT MV: 0.62 CM/S
MV PEAK A VEL: 0.62 M/S
MV PEAK E VEL: 0.8 M/S
PISA AR MAX VEL: 3.66 M/S
PISA TR MAX VEL: 2.33 M/S
PULM VEIN S/D RATIO: 1.33
PV MEAN GRADIENT: 1.34 MMHG
PV MV: 0.88 M/S
PV PEAK D VEL: 0.36 M/S
PV PEAK S VEL: 0.48 M/S
PV PEAK VELOCITY: 1.07 CM/S
RA MAJOR: 3.99 CM
RA PRESSURE: 3 MMHG
RA WIDTH: 2.66 CM
RIGHT VENTRICULAR END-DIASTOLIC DIMENSION: 2.33 CM
SINUS: 2.64 CM
STJ: 2.24 CM
TDI LATERAL: 0.14 M/S
TDI SEPTAL: 0.11 M/S
TDI: 0.13 M/S
TR MAX PG: 22 MMHG
TRICUSPID ANNULAR PLANE SYSTOLIC EXCURSION: 2.28 CM
TV REST PULMONARY ARTERY PRESSURE: 25 MMHG

## 2023-05-31 PROCEDURE — 93306 TTE W/DOPPLER COMPLETE: CPT

## 2023-05-31 PROCEDURE — 93248 CV CARDIAC MONITOR - 3-15 DAY ADULT (CUPID ONLY): ICD-10-PCS | Mod: ,,, | Performed by: INTERNAL MEDICINE

## 2023-05-31 PROCEDURE — 93248 EXT ECG>7D<15D REV&INTERPJ: CPT | Mod: ,,, | Performed by: INTERNAL MEDICINE

## 2023-05-31 PROCEDURE — 93306 ECHO (CUPID ONLY): ICD-10-PCS | Mod: 26,,, | Performed by: INTERNAL MEDICINE

## 2023-05-31 PROCEDURE — 93306 TTE W/DOPPLER COMPLETE: CPT | Mod: 26,,, | Performed by: INTERNAL MEDICINE

## 2023-06-15 ENCOUNTER — HOSPITAL ENCOUNTER (OUTPATIENT)
Dept: RADIOLOGY | Facility: HOSPITAL | Age: 63
Discharge: HOME | End: 2023-06-15
Attending: INTERNAL MEDICINE
Payer: COMMERCIAL

## 2023-06-15 ENCOUNTER — HOSPITAL ENCOUNTER (OUTPATIENT)
Dept: CARDIOLOGY | Facility: HOSPITAL | Age: 63
Discharge: HOME | End: 2023-06-15
Attending: INTERNAL MEDICINE
Payer: COMMERCIAL

## 2023-06-15 DIAGNOSIS — I10 PRIMARY HYPERTENSION: Chronic | ICD-10-CM

## 2023-06-15 DIAGNOSIS — I20.9 AP (ANGINA PECTORIS): ICD-10-CM

## 2023-06-15 DIAGNOSIS — I25.118 CORONARY ARTERY DISEASE OF NATIVE ARTERY OF NATIVE HEART WITH STABLE ANGINA PECTORIS: ICD-10-CM

## 2023-06-15 DIAGNOSIS — R07.89 ATYPICAL CHEST PAIN: ICD-10-CM

## 2023-06-15 DIAGNOSIS — R93.1 AGATSTON CORONARY ARTERY CALCIUM SCORE GREATER THAN 400: ICD-10-CM

## 2023-06-15 DIAGNOSIS — R00.2 PALPITATIONS: ICD-10-CM

## 2023-06-15 DIAGNOSIS — Z98.61 HISTORY OF PTCA: ICD-10-CM

## 2023-06-15 DIAGNOSIS — R94.31 ABNORMAL ECG: ICD-10-CM

## 2023-06-15 LAB
CV STRESS BASE HR: 64 BPM
DIASTOLIC BLOOD PRESSURE: 83 MMHG
NUC REST EJECTION FRACTION: 64
NUC STRESS EJECTION FRACTION: 70 %
OHS CV CPX 85 PERCENT MAX PREDICTED HEART RATE MALE: 136
OHS CV CPX MAX PREDICTED HEART RATE: 160
OHS CV CPX PATIENT IS FEMALE: 0
OHS CV CPX PATIENT IS MALE: 1
OHS CV CPX PEAK DIASTOLIC BLOOD PRESSURE: 92 MMHG
OHS CV CPX PEAK HEAR RATE: 122 BPM
OHS CV CPX PEAK RATE PRESSURE PRODUCT: NORMAL
OHS CV CPX PEAK SYSTOLIC BLOOD PRESSURE: 167 MMHG
OHS CV CPX PERCENT MAX PREDICTED HEART RATE ACHIEVED: 76
OHS CV CPX RATE PRESSURE PRODUCT PRESENTING: 8576
STRESS ECHO POST EXERCISE DUR MIN: 10 MINUTES
STRESS ECHO POST EXERCISE DUR SEC: 2 SECONDS
SYSTOLIC BLOOD PRESSURE: 134 MMHG

## 2023-06-15 PROCEDURE — 93018 CV STRESS TEST I&R ONLY: CPT | Mod: ,,, | Performed by: INTERNAL MEDICINE

## 2023-06-15 PROCEDURE — 93018 NUCLEAR STRESS - CARDIOLOGY INTERPRETED (CUPID ONLY): ICD-10-PCS | Mod: ,,, | Performed by: INTERNAL MEDICINE

## 2023-06-15 PROCEDURE — 93017 CV STRESS TEST TRACING ONLY: CPT

## 2023-06-15 PROCEDURE — 78452 HT MUSCLE IMAGE SPECT MULT: CPT | Mod: 26,,, | Performed by: INTERNAL MEDICINE

## 2023-06-15 PROCEDURE — 93016 CV STRESS TEST SUPVJ ONLY: CPT | Mod: ,,, | Performed by: INTERNAL MEDICINE

## 2023-06-15 PROCEDURE — 78452 NUCLEAR STRESS - CARDIOLOGY INTERPRETED (CUPID ONLY): ICD-10-PCS | Mod: 26,,, | Performed by: INTERNAL MEDICINE

## 2023-06-15 PROCEDURE — 78452 HT MUSCLE IMAGE SPECT MULT: CPT

## 2023-06-15 PROCEDURE — 63600175 PHARM REV CODE 636 W HCPCS: Performed by: INTERNAL MEDICINE

## 2023-06-15 PROCEDURE — 93016 NUCLEAR STRESS - CARDIOLOGY INTERPRETED (CUPID ONLY): ICD-10-PCS | Mod: ,,, | Performed by: INTERNAL MEDICINE

## 2023-06-15 RX ORDER — REGADENOSON 0.08 MG/ML
0.4 INJECTION, SOLUTION INTRAVENOUS ONCE
Status: COMPLETED | OUTPATIENT
Start: 2023-06-15 | End: 2023-06-15

## 2023-06-15 RX ADMIN — REGADENOSON 0.4 MG: 0.08 INJECTION, SOLUTION INTRAVENOUS at 10:06

## 2023-06-16 LAB
OHS CV HOLTER SINUS AVERAGE HR: 70
OHS CV HOLTER SINUS MAX HR: 138
OHS CV HOLTER SINUS MIN HR: 53

## 2023-06-19 ENCOUNTER — ANESTHESIA EVENT (OUTPATIENT)
Dept: ENDOSCOPY | Facility: HOSPITAL | Age: 63
End: 2023-06-19
Payer: COMMERCIAL

## 2023-06-19 NOTE — ANESTHESIA PREPROCEDURE EVALUATION
06/19/2023  Hernan Fields is a 62 y.o., male.  Past Medical History:   Diagnosis Date    Allergy     Anxiety     public speaking issues.    Atypical chest pain 11/05/2018    Coronary artery disease 12/15/2014    dr alexandra    Elevated PSA     Ex-smoker     one year at 20 y/o    False positive serological test for hepatitis C     H/O: Bell's palsy     affects left side    History of PTCA 09/12/2016    Hypertension     Meningitis     Mixed hyperlipidemia 12/15/2014     Past Surgical History:   Procedure Laterality Date    benign pros biops  1/14    CHOLECYSTECTOMY  05/01/2013    FRACTURE SURGERY      HERNIA REPAIR      SMALL INTESTINE SURGERY      TONSILLECTOMY       Patient Active Problem List   Diagnosis    HTN (hypertension)    Anxiety    Agatston coronary artery calcium score greater than 400    Mixed hyperlipidemia    False positive serological test for hepatitis C    Coronary artery disease    History of PTCA    Other male erectile dysfunction    Atypical chest pain    AP (angina pectoris)    Abnormal ECG    Hyperkalemia    Palpitations     Sinus rhythm with 1st degree A-V block   ST elevation, consider early repolarization, pericarditis, or injury   Abnormal ECG   When compared with ECG of 28-NOV-2022 14:24,   No significant change was found   Confirmed by IVIS KUMAR MD ([id number]) on 5/18/2023     Summary TTE 5/31/23     The left ventricle is normal in size with normal systolic function.   The estimated ejection fraction is 65%.   Normal left ventricular diastolic function.   Normal right ventricular size with normal right ventricular systolic function.   Normal central venous pressure (3 mmHg).   The estimated PA systolic pressure is 25 mmHg.    Pre-op Assessment    I have reviewed the Patient Summary Reports.     I have reviewed the Nursing Notes. I have reviewed the NPO  Status.   I have reviewed the Medications.     Review of Systems  Anesthesia Hx:  No problems with previous Anesthesia  Denies Family Hx of Anesthesia complications.   Denies Personal Hx of Anesthesia complications.   Social:  Non-Smoker, No Alcohol Use    Cardiovascular:   Hypertension CAD   Angina ECG has been reviewed. Sinus rhythm with 1st degree A-V block   ST elevation, consider early repolarization, pericarditis, or injury   Abnormal ECG   When compared with ECG of 28-NOV-2022 14:24,   No significant change was found   Confirmed by IVIS KUMAR MD ([id number]) on 5/18/2023    Hepatic/GI:   Bowel Prep.    Psych:   anxiety          Physical Exam  General: Alert and Oriented    Airway:  Mallampati: II   Mouth Opening: Normal  TM Distance: Normal  Tongue: Normal  Neck ROM: Normal ROM    Chest/Lungs:  Normal Respiratory Rate    Heart:  Rate: Normal  Rhythm: Regular Rhythm        Anesthesia Plan  Type of Anesthesia, risks & benefits discussed:    Anesthesia Type: Gen Natural Airway  Intra-op Monitoring Plan: Standard ASA Monitors  Post Op Pain Control Plan: multimodal analgesia  Induction:  IV  Informed Consent: Informed consent signed with the Patient and all parties understand the risks and agree with anesthesia plan.  All questions answered.   ASA Score: 2  Day of Surgery Review of History & Physical: H&P Update referred to the surgeon/provider.    Ready For Surgery From Anesthesia Perspective.     .

## 2023-06-21 ENCOUNTER — HOSPITAL ENCOUNTER (OUTPATIENT)
Facility: HOSPITAL | Age: 63
Discharge: HOME | End: 2023-06-21
Attending: INTERNAL MEDICINE | Admitting: INTERNAL MEDICINE
Payer: COMMERCIAL

## 2023-06-21 ENCOUNTER — ANESTHESIA (OUTPATIENT)
Dept: ENDOSCOPY | Facility: HOSPITAL | Age: 63
End: 2023-06-21
Payer: COMMERCIAL

## 2023-06-21 VITALS
RESPIRATION RATE: 18 BRPM | BODY MASS INDEX: 24.2 KG/M2 | SYSTOLIC BLOOD PRESSURE: 128 MMHG | HEIGHT: 72 IN | DIASTOLIC BLOOD PRESSURE: 78 MMHG | WEIGHT: 178.69 LBS | HEART RATE: 66 BPM | OXYGEN SATURATION: 100 % | TEMPERATURE: 97 F

## 2023-06-21 DIAGNOSIS — Z12.11 COLON CANCER SCREENING: Primary | ICD-10-CM

## 2023-06-21 PROCEDURE — 25000003 PHARM REV CODE 250: Performed by: ANESTHESIOLOGY

## 2023-06-21 PROCEDURE — 63600175 PHARM REV CODE 636 W HCPCS: Performed by: INTERNAL MEDICINE

## 2023-06-21 PROCEDURE — 37000008 HC ANESTHESIA 1ST 15 MINUTES: Performed by: INTERNAL MEDICINE

## 2023-06-21 PROCEDURE — G0121 COLON CA SCRN NOT HI RSK IND: ICD-10-PCS | Mod: ,,, | Performed by: INTERNAL MEDICINE

## 2023-06-21 PROCEDURE — G0121 COLON CA SCRN NOT HI RSK IND: HCPCS | Mod: ,,, | Performed by: INTERNAL MEDICINE

## 2023-06-21 PROCEDURE — 63600175 PHARM REV CODE 636 W HCPCS: Performed by: ANESTHESIOLOGY

## 2023-06-21 PROCEDURE — 25000003 PHARM REV CODE 250: Performed by: INTERNAL MEDICINE

## 2023-06-21 PROCEDURE — D9220A PRA ANESTHESIA: Mod: ,,, | Performed by: ANESTHESIOLOGY

## 2023-06-21 PROCEDURE — G0121 COLON CA SCRN NOT HI RSK IND: HCPCS | Performed by: INTERNAL MEDICINE

## 2023-06-21 PROCEDURE — 37000009 HC ANESTHESIA EA ADD 15 MINS: Performed by: INTERNAL MEDICINE

## 2023-06-21 PROCEDURE — D9220A PRA ANESTHESIA: ICD-10-PCS | Mod: ,,, | Performed by: ANESTHESIOLOGY

## 2023-06-21 RX ORDER — LIDOCAINE HYDROCHLORIDE 10 MG/ML
INJECTION, SOLUTION EPIDURAL; INFILTRATION; INTRACAUDAL; PERINEURAL
Status: DISCONTINUED | OUTPATIENT
Start: 2023-06-21 | End: 2023-06-21

## 2023-06-21 RX ORDER — SODIUM CHLORIDE, SODIUM LACTATE, POTASSIUM CHLORIDE, CALCIUM CHLORIDE 600; 310; 30; 20 MG/100ML; MG/100ML; MG/100ML; MG/100ML
INJECTION, SOLUTION INTRAVENOUS CONTINUOUS
Status: DISCONTINUED | OUTPATIENT
Start: 2023-06-21 | End: 2023-06-21 | Stop reason: HOSPADM

## 2023-06-21 RX ORDER — PROPOFOL 10 MG/ML
VIAL (ML) INTRAVENOUS
Status: DISCONTINUED | OUTPATIENT
Start: 2023-06-21 | End: 2023-06-21

## 2023-06-21 RX ORDER — DEXTROMETHORPHAN/PSEUDOEPHED 2.5-7.5/.8
DROPS ORAL
Status: DISCONTINUED | OUTPATIENT
Start: 2023-06-21 | End: 2023-06-21 | Stop reason: HOSPADM

## 2023-06-21 RX ADMIN — LIDOCAINE HYDROCHLORIDE 30 MG: 10 INJECTION, SOLUTION EPIDURAL; INFILTRATION; INTRACAUDAL; PERINEURAL at 08:06

## 2023-06-21 RX ADMIN — PROPOFOL 100 MG: 10 INJECTION, EMULSION INTRAVENOUS at 08:06

## 2023-06-21 RX ADMIN — SODIUM CHLORIDE, POTASSIUM CHLORIDE, SODIUM LACTATE AND CALCIUM CHLORIDE: 600; 310; 30; 20 INJECTION, SOLUTION INTRAVENOUS at 08:06

## 2023-06-21 NOTE — TRANSFER OF CARE
Anesthesia Transfer of Care Note    Patient: Hernan Fiedls    Procedure(s) Performed: Procedure(s) (LRB):  COLONOSCOPY (N/A)    Patient location: PACU    Anesthesia Type: general    Transport from OR: Transported from OR on room air with adequate spontaneous ventilation    Post pain: adequate analgesia    Post assessment: no apparent anesthetic complications    Post vital signs: stable    Level of consciousness: awake, alert, oriented and responds to stimulation    Nausea/Vomiting: no nausea/vomiting    Complications: none    Transfer of care protocol was followed      Last vitals:   Visit Vitals  /77 (BP Location: Left arm, Patient Position: Sitting)   Pulse 69   Temp 36.2 °C (97.2 °F) (Temporal)   Resp 17   Ht 6' (1.829 m)   Wt 81.1 kg (178 lb 10.9 oz)   SpO2 95%   BMI 24.23 kg/m²

## 2023-06-21 NOTE — H&P
PRE PROCEDURE H&P    Patient Name: Hernan Fields  MRN: [id number]  : 1960  Date of Procedure:  2023  Referring Physician: Venkata Daniels MD  Primary Physician: Venkata Daniels MD  Procedure Physician: Maame Miranda MD       Planned Procedure: Colonoscopy  Diagnosis: screening for colon cancer  Chief Complaint: Same as above    HPI: Patient is an 62 y.o. male is here for the above.     Last colonoscopy: 10 years ago   Family history: neg   Anticoagulation: none     Past Medical History:   Past Medical History:   Diagnosis Date    Allergy     Anxiety     public speaking issues.    Atypical chest pain 2018    Coronary artery disease 12/15/2014    dr alexandra    Elevated PSA     Ex-smoker     one year at 20 y/o    False positive serological test for hepatitis C     H/O: Bell's palsy     affects left side    History of PTCA 2016    Hypertension     Meningitis     Mixed hyperlipidemia 12/15/2014        Past Surgical History:  Past Surgical History:   Procedure Laterality Date    benign pros biops      CHOLECYSTECTOMY  2013    FRACTURE SURGERY      HERNIA REPAIR      SMALL INTESTINE SURGERY      TONSILLECTOMY          Home Medications:  Prior to Admission medications    Medication Sig Start Date End Date Taking? Authorizing Provider   albuterol (PROVENTIL/VENTOLIN HFA) 90 mcg/actuation inhaler Inhale 2 puffs into the lungs every 6 (six) hours as needed for Wheezing or Shortness of Breath. 9/15/21 6/21/23 Yes Venkata Daniels MD   amLODIPine (NORVASC) 2.5 MG tablet Take 2 tablets (5 mg total) by mouth once daily. 22 Yes Venkata Daniels MD   aspirin (ECOTRIN) 81 MG EC tablet Take by mouth. 1 Tablet, Delayed Release (E.C.) Oral Every day   Yes Historical Provider   atorvastatin (LIPITOR) 80 MG tablet Take 1 tablet (80 mg total) by mouth every evening. 22  Yes Venkata Daniels MD   metoprolol succinate (TOPROL-XL) 50 MG 24 hr tablet Take 1 tablet (50 mg total) by mouth once  daily. 22  Yes Venkata Daniels MD   ranolazine (RANEXA) 500 MG Tb12 Take 2 pills in am and 2 pills in pm 23  Yes Umer Agustin MD   valacyclovir (VALTREX) 500 MG tablet Take 1 tablet by mouth once daily. 12/27/15  Yes Historical Provider   clonazePAM (KLONOPIN) 0.5 MG tablet Take 1 tablet (0.5 mg total) by mouth 2 (two) times daily as needed for Anxiety. 21  Venkata Daniels MD   nitroGLYCERIN (NITROSTAT) 0.4 MG SL tablet Place 1 tablet (0.4 mg total) under the tongue every 5 (five) minutes as needed. 23  Umer Agustin MD   sildenafiL (VIAGRA) 100 MG tablet 1/2 to 1 tab po qd prn erectile dysfunction. 21   Venkata Daniels MD        Allergies:  Review of patient's allergies indicates:   Allergen Reactions    Iodine and iodide containing products Hives    Iodine Rash        Social History:   Social History     Socioeconomic History    Marital status:     Number of children: 1   Occupational History    Occupation: cost analyist     Employer: [employer]     Comment: [employer]   Tobacco Use    Smoking status: Former     Types: Cigarettes     Quit date: 1980     Years since quittin.4    Smokeless tobacco: Never   Substance and Sexual Activity    Alcohol use: Yes     Alcohol/week: 0.0 standard drinks    Drug use: No    Sexual activity: Yes     Partners: Female   Social History Narrative    Pt works at [employer]     Social Determinants of Health     Financial Resource Strain: Unknown    Difficulty of Paying Living Expenses: Patient refused   Food Insecurity: Unknown    Worried About Running Out of Food in the Last Year: Patient refused    Ran Out of Food in the Last Year: Patient refused   Transportation Needs: Unknown    Lack of Transportation (Medical): Patient refused    Lack of Transportation (Non-Medical): Patient refused   Physical Activity: Unknown    Days of Exercise per Week: Patient refused   Social Connections: Unknown    Frequency of Communication with  Friends and Family: Patient refused    Frequency of Social Gatherings with Friends and Family: Patient refused    Active Member of Clubs or Organizations: Patient refused    Attends Club or Organization Meetings: Patient refused    Marital Status: Patient refused   Housing Stability: Unknown    Unable to Pay for Housing in the Last Year: Patient refused    Unstable Housing in the Last Year: Patient refused       Family History:  Family History   Problem Relation Age of Onset    COPD Father     Hypertension Father     Prostate cancer Neg Hx     Melanoma Neg Hx        ROS: No acute cardiac events, no acute respiratory complaints.     Physical Exam (all patients):    BP (!) 140/78 (BP Location: Right arm, Patient Position: Sitting)   Pulse 69   Temp 97.3 °F (36.3 °C) (Temporal)   Resp 20   Ht 6' (1.829 m)   Wt 81.1 kg (178 lb 10.9 oz)   SpO2 100%   BMI 24.23 kg/m²   Lungs: Clear to auscultation bilaterally, respirations unlabored  Heart: Regular rate and rhythm, S1 and S2 normal, no obvious murmurs  Abdomen:         Soft, non-tender, bowel sounds normal, no masses, no organomegaly    Lab Results   Component Value Date    WBC 4.92 05/18/2023    MCV 95 05/18/2023    RDW 11.4 (L) 05/18/2023     05/18/2023    INR 1.0 05/16/2016    GLU 98 05/18/2023    HGBA1C 4.9 05/18/2023    BUN 18 05/18/2023     05/18/2023    K 4.4 05/18/2023     05/18/2023        SEDATION PLAN: per anesthesia      History reviewed, vital signs satisfactory, cardiopulmonary status satisfactory, sedation options, risks and plans have been discussed with the patient  All their questions were answered and the patient agrees to the sedation procedures as planned and the patient is deemed an appropriate candidate for the sedation as planned.    Procedure explained to patient, informed consent obtained and placed in chart.    Maame Miranda  6/21/2023  8:40 AM

## 2023-06-21 NOTE — PROVATION PATIENT INSTRUCTIONS
Discharge Summary/Instructions after an Endoscopic Procedure  Patient Name: Hernan Fields  Patient MRN: [id number]  Patient YOB: 1960 Wednesday, June 21, 2023  Maame Miranda MD  Dear patient,  As a result of recent federal legislation (The Federal Cures Act), you may   receive lab or pathology results from your procedure in your [facility]   account before your physician is able to contact you. Your physician or   their representative will relay the results to you with their   recommendations at their soonest availability.  Thank you,  RESTRICTIONS:  During your procedure today, you received medications for sedation.  These   medications may affect your judgment, balance and coordination.  Therefore,   for 24 hours, you have the following restrictions:   - DO NOT drive a car, operate machinery, make legal/financial decisions,   sign important papers or drink alcohol.    ACTIVITY:  Today: no heavy lifting, straining or running due to procedural   sedation/anesthesia.  The following day: return to full activity including work.  DIET:  Eat and drink normally unless instructed otherwise.     TREATMENT FOR COMMON SIDE EFFECTS:  - Mild abdominal pain, nausea, belching, bloating or excessive gas:  rest,   eat lightly and use a heating pad.  - Sore Throat: treat with throat lozenges and/or gargle with warm salt   water.  - Because air was used during the procedure, expelling large amounts of air   from your rectum or belching is normal.  - If a bowel prep was taken, you may not have a bowel movement for 1-3 days.    This is normal.  SYMPTOMS TO WATCH FOR AND REPORT TO YOUR PHYSICIAN:  1. Abdominal pain or bloating, other than gas cramps.  2. Chest pain.  3. Back pain.  4. Signs of infection such as: chills or fever occurring within 24 hours   after the procedure.  5. Rectal bleeding, which would show as bright red, maroon, or black stools.   (A tablespoon of blood from the rectum is not serious, especially if    hemorrhoids are present.)  6. Vomiting.  7. Weakness or dizziness.  GO DIRECTLY TO THE NEAREST EMERGENCY ROOM IF YOU HAVE ANY OF THE FOLLOWING:      Difficulty breathing              Chills and/or fever over 101 F   Persistent vomiting and/or vomiting blood   Severe abdominal pain   Severe chest pain   Black, tarry stools   Bleeding- more than one tablespoon   Any other symptom or condition that you feel may need urgent attention  Your doctor recommends these additional instructions:  If any biopsies were taken, your doctors clinic will contact you in 1 to 2   weeks with any results.  - Patient has a contact number available for emergencies.  The signs and   symptoms of potential delayed complications were discussed with the   patient.  Return to normal activities tomorrow.  Written discharge   instructions were provided to the patient.   - Discharge patient to home (via wheelchair).   - Resume previous diet today.   - Continue present medications.   - Repeat colonoscopy in 10 years for screening purposes.  For questions, problems or results please call your physician Maame Miranda MD at Work:  [phone]  If you have any questions about the above instructions, call the GI   department at [phone] or call the endoscopy unit at [phone]   from 7am until 3 pm.  [facility] [place], EMERGENCY ROOM PHONE NUMBER:   [phone]  IF A COMPLICATION OR EMERGENCY SITUATION ARISES AND YOU ARE UNABLE TO REACH   YOUR PHYSICIAN - GO DIRECTLY TO THE EMERGENCY ROOM.  I have read or have had read to me these discharge instructions for my   procedure and have received a written copy.  I understand these   instructions and will follow-up with my physician if I have any questions.     __________________________________       _____________________________________  Nurse Signature                                          Patient/Designated   Responsible Party Signature  MD Maame Traylor  MD Ruth  6/21/2023 9:07:33 AM  This report has been verified and signed electronically.  Dear patient,  As a result of recent federal legislation (The Federal Cures Act), you may   receive lab or pathology results from your procedure in your [facility]   account before your physician is able to contact you. Your physician or   their representative will relay the results to you with their   recommendations at their soonest availability.  Thank you,  PROVATION

## 2023-06-21 NOTE — ANESTHESIA POSTPROCEDURE EVALUATION
Anesthesia Post Evaluation    Patient: Hernan Fields    Procedure(s) Performed: Procedure(s) (LRB):  COLONOSCOPY (N/A)    Final Anesthesia Type: general      Patient location during evaluation: GI PACU  Patient participation: Yes- Able to Participate  Level of consciousness: awake  Post-procedure vital signs: reviewed and stable  Pain management: adequate  Airway patency: patent    PONV status at discharge: No PONV  Anesthetic complications: no      Cardiovascular status: stable  Respiratory status: unassisted  Hydration status: euvolemic  Follow-up not needed.          Vitals Value Taken Time   /76 06/21/23 0916   Temp 36.2 °C (97.2 °F) 06/21/23 0908   Pulse 64 06/21/23 0919   Resp 22 06/21/23 0919   SpO2 100 % 06/21/23 0919   Vitals shown include unvalidated device data.      No case tracking events are documented in the log.      Pain/Caroline Score: Caroline Score: 9 (6/21/2023  9:08 AM)

## 2023-08-03 ENCOUNTER — OFFICE VISIT (OUTPATIENT)
Dept: CARDIOLOGY | Facility: CLINIC | Age: 63
End: 2023-08-03
Payer: COMMERCIAL

## 2023-08-03 VITALS
OXYGEN SATURATION: 100 % | HEIGHT: 72 IN | WEIGHT: 186.31 LBS | SYSTOLIC BLOOD PRESSURE: 102 MMHG | BODY MASS INDEX: 25.24 KG/M2 | DIASTOLIC BLOOD PRESSURE: 70 MMHG | HEART RATE: 67 BPM

## 2023-08-03 DIAGNOSIS — I10 PRIMARY HYPERTENSION: Chronic | ICD-10-CM

## 2023-08-03 DIAGNOSIS — R94.31 ABNORMAL ECG: ICD-10-CM

## 2023-08-03 DIAGNOSIS — I25.118 CORONARY ARTERY DISEASE OF NATIVE ARTERY OF NATIVE HEART WITH STABLE ANGINA PECTORIS: Primary | ICD-10-CM

## 2023-08-03 DIAGNOSIS — R07.89 ATYPICAL CHEST PAIN: ICD-10-CM

## 2023-08-03 DIAGNOSIS — R00.2 PALPITATIONS: ICD-10-CM

## 2023-08-03 DIAGNOSIS — I49.3 PVC'S (PREMATURE VENTRICULAR CONTRACTIONS): ICD-10-CM

## 2023-08-03 DIAGNOSIS — Z98.61 HISTORY OF PTCA: ICD-10-CM

## 2023-08-03 DIAGNOSIS — I25.10 CORONARY ARTERY DISEASE DUE TO LIPID RICH PLAQUE: ICD-10-CM

## 2023-08-03 DIAGNOSIS — I25.83 CORONARY ARTERY DISEASE DUE TO LIPID RICH PLAQUE: ICD-10-CM

## 2023-08-03 DIAGNOSIS — R93.1 AGATSTON CORONARY ARTERY CALCIUM SCORE GREATER THAN 400: ICD-10-CM

## 2023-08-03 DIAGNOSIS — E78.2 MIXED HYPERLIPIDEMIA: ICD-10-CM

## 2023-08-03 DIAGNOSIS — I20.9 AP (ANGINA PECTORIS): ICD-10-CM

## 2023-08-03 PROCEDURE — 3044F PR MOST RECENT HEMOGLOBIN A1C LEVEL <7.0%: ICD-10-PCS | Mod: CPTII,S$GLB,, | Performed by: INTERNAL MEDICINE

## 2023-08-03 PROCEDURE — 3078F DIAST BP <80 MM HG: CPT | Mod: CPTII,S$GLB,, | Performed by: INTERNAL MEDICINE

## 2023-08-03 PROCEDURE — 1159F MED LIST DOCD IN RCRD: CPT | Mod: CPTII,S$GLB,, | Performed by: INTERNAL MEDICINE

## 2023-08-03 PROCEDURE — 3008F BODY MASS INDEX DOCD: CPT | Mod: CPTII,S$GLB,, | Performed by: INTERNAL MEDICINE

## 2023-08-03 PROCEDURE — 1160F PR REVIEW ALL MEDS BY PRESCRIBER/CLIN PHARMACIST DOCUMENTED: ICD-10-PCS | Mod: CPTII,S$GLB,, | Performed by: INTERNAL MEDICINE

## 2023-08-03 PROCEDURE — 99214 OFFICE O/P EST MOD 30 MIN: CPT | Mod: S$GLB,,, | Performed by: INTERNAL MEDICINE

## 2023-08-03 PROCEDURE — 3008F PR BODY MASS INDEX (BMI) DOCUMENTED: ICD-10-PCS | Mod: CPTII,S$GLB,, | Performed by: INTERNAL MEDICINE

## 2023-08-03 PROCEDURE — 3074F PR MOST RECENT SYSTOLIC BLOOD PRESSURE < 130 MM HG: ICD-10-PCS | Mod: CPTII,S$GLB,, | Performed by: INTERNAL MEDICINE

## 2023-08-03 PROCEDURE — 99999 PR PBB SHADOW E&M-EST. PATIENT-LVL III: CPT | Mod: PBBFAC,,, | Performed by: INTERNAL MEDICINE

## 2023-08-03 PROCEDURE — 3074F SYST BP LT 130 MM HG: CPT | Mod: CPTII,S$GLB,, | Performed by: INTERNAL MEDICINE

## 2023-08-03 PROCEDURE — 3078F PR MOST RECENT DIASTOLIC BLOOD PRESSURE < 80 MM HG: ICD-10-PCS | Mod: CPTII,S$GLB,, | Performed by: INTERNAL MEDICINE

## 2023-08-03 PROCEDURE — 99214 PR OFFICE/OUTPT VISIT, EST, LEVL IV, 30-39 MIN: ICD-10-PCS | Mod: S$GLB,,, | Performed by: INTERNAL MEDICINE

## 2023-08-03 PROCEDURE — 3044F HG A1C LEVEL LT 7.0%: CPT | Mod: CPTII,S$GLB,, | Performed by: INTERNAL MEDICINE

## 2023-08-03 PROCEDURE — 1159F PR MEDICATION LIST DOCUMENTED IN MEDICAL RECORD: ICD-10-PCS | Mod: CPTII,S$GLB,, | Performed by: INTERNAL MEDICINE

## 2023-08-03 PROCEDURE — 1160F RVW MEDS BY RX/DR IN RCRD: CPT | Mod: CPTII,S$GLB,, | Performed by: INTERNAL MEDICINE

## 2023-08-03 PROCEDURE — 99999 PR PBB SHADOW E&M-EST. PATIENT-LVL III: ICD-10-PCS | Mod: PBBFAC,,, | Performed by: INTERNAL MEDICINE

## 2023-08-03 RX ORDER — RANOLAZINE 500 MG/1
TABLET, EXTENDED RELEASE ORAL
Qty: 180 TABLET | Refills: 5
Start: 2023-08-03

## 2023-08-03 NOTE — PROGRESS NOTES
Subjective:    Patient ID:  Hernan Fields is a 63 y.o. male who presents for evaluation of Follow-up, Hypertension, Coronary Artery Disease, and Hyperlipidemia        HPI: Pt presents for eval.  His current medical conditions include CAD, dyslipidemia, HTN.   Nonsmoker.   + family h/o CAD.   Past hx pertinent for following:  H/o abnormal calcium score 1087 in 2014.   H/o  chronic atypical cp sxs  S/p PCI RCA NADINE x 2 June 2016; calcified nonobstructive LAD/left main disease as well, normal LVEF.  Stress echo 9/18 good exercise capacity, no wall motion abnormality, normal LV function.  ecg 10/19/21 NSR, nonspecific st abnl.  Stress echo 9/21 reviewed: good exercise capacity, exercised 11 minutes, no ecg changes, no ischemia on echo images.  Echo normal LV function.  Ecg 11/28/22 NSR, normal ecg.   Now here.  Pt last seen May 2023.  Had some increased CP, and palpitations last appt.  CP very short, few seconds.  Ranexa increased for few weeks, felt better soon.  Went back to 500 mg bid.  Stress test, echo and Holter ordered.  CP stable, infrequent, and chronic.  Minimal MATIAS, stble.  Feels ok right now.  Some exercise.  BP is controlled.  1 week Vital Holter 5/23: NSR, rare ectopy.  Did note some sxs with some PVCs though.  Echo May 2023: normal LV function.  Stress MPI May 2023: good exercise capacity, no ischemia, normal EF.        Past Medical History:   Diagnosis Date    Allergy     Anxiety     public speaking issues.    Atypical chest pain 11/05/2018    Coronary artery disease 12/15/2014    dr alexandra    Elevated PSA     Ex-smoker     one year at 20 y/o    False positive serological test for hepatitis C     H/O: Bell's palsy     affects left side    History of PTCA 09/12/2016    Hypertension     Meningitis     Mixed hyperlipidemia 12/15/2014     Current Outpatient Medications   Medication Instructions    albuterol (PROVENTIL/VENTOLIN HFA) 90 mcg/actuation inhaler 2 puffs, Inhalation, Every 6 hours PRN    amLODIPine  (NORVASC) 5 mg, Oral, Daily    aspirin (ECOTRIN) 81 MG EC tablet Take by mouth. 1 Tablet, Delayed Release (E.C.) Oral Every day    atorvastatin (LIPITOR) 80 mg, Oral, Nightly    clonazePAM (KLONOPIN) 0.5 mg, Oral, 2 times daily PRN    metoprolol succinate (TOPROL-XL) 50 mg, Oral, Daily    nitroGLYCERIN (NITROSTAT) 0.4 mg, Sublingual, Every 5 min PRN    ranolazine (RANEXA) 500 MG Tb12 Take 1 pill1 in am and1 pill in pm    sildenafiL (VIAGRA) 100 MG tablet 1/2 to 1 tab po qd prn erectile dysfunction.    valacyclovir (VALTREX) 500 MG tablet 1 tablet, Oral, Daily         Review of Systems   Constitutional: Negative.   HENT: Negative.     Eyes: Negative.    Cardiovascular:  Positive for chest pain and palpitations.   Respiratory: Negative.     Endocrine: Negative.    Hematologic/Lymphatic: Negative.    Skin: Negative.    Musculoskeletal: Negative.    Gastrointestinal: Negative.    Genitourinary: Negative.    Neurological: Negative.    Psychiatric/Behavioral: Negative.     Allergic/Immunologic: Negative.           /70 (BP Location: Left arm, Patient Position: Sitting, BP Method: Large (Manual))   Pulse 67   Ht 6' (1.829 m)   Wt 84.5 kg (186 lb 4.6 oz)   SpO2 100%   BMI 25.27 kg/m²     Wt Readings from Last 3 Encounters:   08/03/23 84.5 kg (186 lb 4.6 oz)   06/21/23 81.1 kg (178 lb 10.9 oz)   05/31/23 84.4 kg (186 lb)     Temp Readings from Last 3 Encounters:   06/21/23 97.2 °F (36.2 °C) (Temporal)   05/18/23 97.3 °F (36.3 °C)   09/20/22 96.7 °F (35.9 °C) (Tympanic)     BP Readings from Last 3 Encounters:   08/03/23 102/70   06/21/23 128/78   05/29/23 100/60     Pulse Readings from Last 3 Encounters:   08/03/23 67   06/21/23 66   05/29/23 70       Objective:    Physical Exam  Vitals and nursing note reviewed.   Constitutional:       Appearance: He is well-developed.   HENT:      Head: Normocephalic.   Neck:      Thyroid: No thyromegaly.      Vascular: No carotid bruit or JVD.   Cardiovascular:      Rate and  Rhythm: Normal rate and regular rhythm.      Pulses:           Radial pulses are 2+ on the right side and 2+ on the left side.      Heart sounds: S1 normal and S2 normal. Heart sounds not distant. No midsystolic click and no opening snap. No murmur heard.     No friction rub. No S3 or S4 sounds.   Pulmonary:      Effort: Pulmonary effort is normal.      Breath sounds: Normal breath sounds. No wheezing or rales.   Abdominal:      General: Bowel sounds are normal. There is no distension or abdominal bruit.      Palpations: Abdomen is soft.      Tenderness: There is no abdominal tenderness.   Musculoskeletal:      Cervical back: Neck supple.   Skin:     General: Skin is warm.   Neurological:      Mental Status: He is alert and oriented to person, place, and time.   Psychiatric:         Behavior: Behavior normal.         I have reviewed all pertinent labs and cardiac studies.  Component      Latest Ref Rng & Units 11/7/2022   CRP, High Sensitivity      0.00 - 3.19 mg/L 0.41       Chemistry        Component Value Date/Time     05/18/2023 0807    K 4.4 05/18/2023 0807     05/18/2023 0807    CO2 28 05/18/2023 0807    BUN 18 05/18/2023 0807    CREATININE 1.0 05/18/2023 0807    GLU 98 05/18/2023 0807        Component Value Date/Time    CALCIUM 9.6 05/18/2023 0807    ALKPHOS 74 05/18/2023 0807    AST 14 05/18/2023 0807    ALT 21 05/18/2023 0807    BILITOT 0.6 05/18/2023 0807    ESTGFRAFRICA >60.0 06/09/2022 0819    ESTGFRAFRICA >60.0 06/09/2022 0819    EGFRNONAA >60.0 06/09/2022 0819    EGFRNONAA >60.0 06/09/2022 0819        Lab Results   Component Value Date    WBC 4.92 05/18/2023    HGB 15.1 05/18/2023    HCT 42.4 05/18/2023    MCV 95 05/18/2023     05/18/2023       Lab Results   Component Value Date    HGBA1C 4.9 05/18/2023     Lab Results   Component Value Date    CHOL 96 (L) 05/18/2023    CHOL 123 11/07/2022    CHOL 123 09/20/2022     Lab Results   Component Value Date    HDL 37 (L) 05/18/2023    HDL  47 11/07/2022    HDL 42 09/20/2022     Lab Results   Component Value Date    LDLCALC 46.2 (L) 05/18/2023    LDLCALC 53.4 (L) 11/07/2022    LDLCALC 55.8 (L) 09/20/2022     Lab Results   Component Value Date    TRIG 64 05/18/2023    TRIG 113 11/07/2022    TRIG 126 09/20/2022     Lab Results   Component Value Date    CHOLHDL 38.5 05/18/2023    CHOLHDL 38.2 11/07/2022    CHOLHDL 34.1 09/20/2022     Results for orders placed during the hospital encounter of 05/31/23    Echo    Interpretation Summary  · The left ventricle is normal in size with normal systolic function.  · The estimated ejection fraction is 65%.  · Normal left ventricular diastolic function.  · Normal right ventricular size with normal right ventricular systolic function.  · Normal central venous pressure (3 mmHg).  · The estimated PA systolic pressure is 25 mmHg.      Results for orders placed during the hospital encounter of 06/15/23    Nuclear Stress - Cardiology Interpreted    Interpretation Summary    The patient exercised for 10 minutes 2 seconds on a Hany protocol, achieving a peak heart rate of 122 bpm, which is 76 % of the age predicted maximum heart rate.    Normal myocardial perfusion scan. There is no evidence of myocardial ischemia or infarction.    The gated perfusion images showed an ejection fraction of 64% at rest. The gated perfusion images showed an ejection fraction of 70% post stress.    There is normal wall motion at rest and post stress.    LV cavity size is normal at rest and normal at stress.    The ECG portion of the study is negative for ischemia.    The patient reported no chest pain during the stress test.    Changed to walking lexiscan due to inability to reach target HR. Dyspnea and fatigue occurred with stress- resolved during recovery. No chest pain.        Assessment:       1. Coronary artery disease of native artery of native heart with stable angina pectoris    2. Abnormal ECG    3. Agatston coronary artery calcium  score greater than 400    4. AP (angina pectoris)    5. Atypical chest pain    6. Palpitations    7. Mixed hyperlipidemia    8. Primary hypertension    9. History of PTCA    10. Coronary artery disease due to lipid rich plaque    11. PVC's (premature ventricular contractions)         Plan:             Stable CV status on current med tx.  CAD: stable. No ischemia on June 2023 stress test.  Continue OMT for CAD.  Ranexa 500 mg bid.  Sublingual nitroglycerin 0.4 mg for concerning chest pain episodes or breakthrough angina.  Patient advised of indications, side effects of nitroglycerin and when to report to ER.  Palpitations: stable. PVCs on 6/23 Holter but rare.  Continue beta-blocker and observation.  Reviewed all tests and above medical conditions with patient in detail and formulated treatment plan.  Continue optimal medical treatment for cardiovascular conditions.  Cardiac low salt diet advised.  Daily exercise encouraged, with the goal 30 +  minutes aerobic exercise as tolerated.  Maintaining healthy weight and weight loss goals (if needed) were discussed in clinic.  HTN: Need for BP control and HTN goals (if needed) were discussed and tx plan formulated.  Goal < 130/80.  Continue current HTN meds.  Lipids:Importance of optimal lipid control were discussed in detail as well as possible pharmacologic and lifestyle changes that may be needed.  Continue statin tx.  Abnl ecg: stable. Monitor.  F/u 6 months with fasting labs.    I have reviewed all pertinent labs and cardiac studies independently. Plans and recommendations have been formulated under my direct supervision. All questions answered and patient voiced understanding.

## 2023-12-03 DIAGNOSIS — I10 ESSENTIAL HYPERTENSION: Chronic | ICD-10-CM

## 2023-12-03 DIAGNOSIS — I25.83 CORONARY ARTERY DISEASE DUE TO LIPID RICH PLAQUE: ICD-10-CM

## 2023-12-03 DIAGNOSIS — I25.10 CORONARY ARTERY DISEASE DUE TO LIPID RICH PLAQUE: ICD-10-CM

## 2023-12-03 NOTE — TELEPHONE ENCOUNTER
No care due was identified.  Health [facility] Embedded Care Due Messages. Reference number: [id number].   12/03/2023 9:08:05 AM CST

## 2023-12-04 RX ORDER — ATORVASTATIN CALCIUM 80 MG/1
80 TABLET, FILM COATED ORAL NIGHTLY
Qty: 90 TABLET | Refills: 1 | Status: SHIPPED | OUTPATIENT
Start: 2023-12-04 | End: 2024-03-05

## 2023-12-04 RX ORDER — METOPROLOL SUCCINATE 50 MG/1
50 TABLET, EXTENDED RELEASE ORAL DAILY
Qty: 90 TABLET | Refills: 1 | Status: SHIPPED | OUTPATIENT
Start: 2023-12-04

## 2023-12-04 RX ORDER — AMLODIPINE BESYLATE 2.5 MG/1
5 TABLET ORAL DAILY
Qty: 180 TABLET | Refills: 1 | Status: SHIPPED | OUTPATIENT
Start: 2023-12-04

## 2023-12-04 NOTE — TELEPHONE ENCOUNTER
Refill Decision Note   Hernan Diamond  is requesting a refill authorization.  Brief Assessment and Rationale for Refill:  Approve     Medication Therapy Plan:         Comments:     Note composed:10:28 AM 12/04/2023

## 2024-01-05 ENCOUNTER — OFFICE VISIT (OUTPATIENT)
Dept: FAMILY MEDICINE | Facility: CLINIC | Age: 64
End: 2024-01-05
Payer: COMMERCIAL

## 2024-01-05 VITALS
HEART RATE: 96 BPM | BODY MASS INDEX: 25.18 KG/M2 | TEMPERATURE: 99 F | OXYGEN SATURATION: 97 % | HEIGHT: 72 IN | WEIGHT: 185.88 LBS | DIASTOLIC BLOOD PRESSURE: 82 MMHG | SYSTOLIC BLOOD PRESSURE: 120 MMHG

## 2024-01-05 DIAGNOSIS — J06.9 ACUTE URI: Primary | ICD-10-CM

## 2024-01-05 PROBLEM — Z12.11 COLON CANCER SCREENING: Status: RESOLVED | Noted: 2023-06-21 | Resolved: 2024-01-05

## 2024-01-05 LAB
CTP QC/QA: YES
POC MOLECULAR INFLUENZA A AGN: NEGATIVE
POC MOLECULAR INFLUENZA B AGN: NEGATIVE

## 2024-01-05 PROCEDURE — 99213 OFFICE O/P EST LOW 20 MIN: CPT | Mod: S$GLB,,, | Performed by: FAMILY MEDICINE

## 2024-01-05 PROCEDURE — 99999 PR PBB SHADOW E&M-EST. PATIENT-LVL IV: CPT | Mod: PBBFAC,,, | Performed by: FAMILY MEDICINE

## 2024-01-05 PROCEDURE — 87502 INFLUENZA DNA AMP PROBE: CPT | Mod: QW,S$GLB,, | Performed by: FAMILY MEDICINE

## 2024-01-05 PROCEDURE — 1159F MED LIST DOCD IN RCRD: CPT | Mod: CPTII,S$GLB,, | Performed by: FAMILY MEDICINE

## 2024-01-05 PROCEDURE — 3074F SYST BP LT 130 MM HG: CPT | Mod: CPTII,S$GLB,, | Performed by: FAMILY MEDICINE

## 2024-01-05 PROCEDURE — 3008F BODY MASS INDEX DOCD: CPT | Mod: CPTII,S$GLB,, | Performed by: FAMILY MEDICINE

## 2024-01-05 PROCEDURE — 3079F DIAST BP 80-89 MM HG: CPT | Mod: CPTII,S$GLB,, | Performed by: FAMILY MEDICINE

## 2024-02-02 ENCOUNTER — OFFICE VISIT (OUTPATIENT)
Dept: INTERNAL MEDICINE | Facility: CLINIC | Age: 64
End: 2024-02-02
Payer: COMMERCIAL

## 2024-02-02 VITALS
TEMPERATURE: 98 F | SYSTOLIC BLOOD PRESSURE: 138 MMHG | HEIGHT: 72 IN | HEART RATE: 72 BPM | BODY MASS INDEX: 25.59 KG/M2 | DIASTOLIC BLOOD PRESSURE: 84 MMHG | WEIGHT: 188.94 LBS | OXYGEN SATURATION: 99 %

## 2024-02-02 DIAGNOSIS — J30.2 SEASONAL ALLERGIES: Primary | ICD-10-CM

## 2024-02-02 DIAGNOSIS — T14.8XXA ABRASION: ICD-10-CM

## 2024-02-02 DIAGNOSIS — R05.8 POST-VIRAL COUGH SYNDROME: ICD-10-CM

## 2024-02-02 PROCEDURE — 3079F DIAST BP 80-89 MM HG: CPT | Mod: CPTII,S$GLB,, | Performed by: INTERNAL MEDICINE

## 2024-02-02 PROCEDURE — 1159F MED LIST DOCD IN RCRD: CPT | Mod: CPTII,S$GLB,, | Performed by: INTERNAL MEDICINE

## 2024-02-02 PROCEDURE — 99999 PR PBB SHADOW E&M-EST. PATIENT-LVL V: CPT | Mod: PBBFAC,,, | Performed by: INTERNAL MEDICINE

## 2024-02-02 PROCEDURE — 3008F BODY MASS INDEX DOCD: CPT | Mod: CPTII,S$GLB,, | Performed by: INTERNAL MEDICINE

## 2024-02-02 PROCEDURE — 3075F SYST BP GE 130 - 139MM HG: CPT | Mod: CPTII,S$GLB,, | Performed by: INTERNAL MEDICINE

## 2024-02-02 PROCEDURE — 99214 OFFICE O/P EST MOD 30 MIN: CPT | Mod: S$GLB,,, | Performed by: INTERNAL MEDICINE

## 2024-02-02 RX ORDER — BENZONATATE 200 MG/1
200 CAPSULE ORAL 3 TIMES DAILY PRN
Qty: 30 CAPSULE | Refills: 0 | Status: SHIPPED | OUTPATIENT
Start: 2024-02-02 | End: 2024-02-12

## 2024-02-02 RX ORDER — CICLOPIROX OLAMINE 7.7 MG/G
CREAM TOPICAL 2 TIMES DAILY
Qty: 30 G | Refills: 0 | Status: SHIPPED | OUTPATIENT
Start: 2024-02-02

## 2024-02-02 RX ORDER — MUPIROCIN 20 MG/G
OINTMENT TOPICAL 3 TIMES DAILY
Qty: 30 G | Refills: 0 | Status: SHIPPED | OUTPATIENT
Start: 2024-02-02

## 2024-02-02 RX ORDER — FLUTICASONE PROPIONATE 50 MCG
2 SPRAY, SUSPENSION (ML) NASAL DAILY
Qty: 16 G | Refills: 1 | Status: SHIPPED | OUTPATIENT
Start: 2024-02-02 | End: 2024-02-26
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
## 2024-02-02 NOTE — PATIENT INSTRUCTIONS
flonase nasal spray 2 spays each nostril at night for nasal congestion, post nasal drip, runny nose    Delsym as needed for cough    Allegra or zyrtec for allergies, post nasal drip, runny nose, watery eyes.     Check with your pharmacy regarding RSV vaccine and COVID booster.

## 2024-02-09 ENCOUNTER — LAB VISIT (OUTPATIENT)
Dept: LAB | Facility: HOSPITAL | Age: 64
End: 2024-02-09
Attending: INTERNAL MEDICINE
Payer: COMMERCIAL

## 2024-02-09 DIAGNOSIS — I25.118 CORONARY ARTERY DISEASE OF NATIVE ARTERY OF NATIVE HEART WITH STABLE ANGINA PECTORIS: ICD-10-CM

## 2024-02-09 DIAGNOSIS — E78.2 MIXED HYPERLIPIDEMIA: ICD-10-CM

## 2024-02-09 LAB
ALBUMIN SERPL BCP-MCNC: 4 G/DL (ref 3.5–5.2)
ALP SERPL-CCNC: 75 U/L (ref 55–135)
ALT SERPL W/O P-5'-P-CCNC: 33 U/L (ref 10–44)
ANION GAP SERPL CALC-SCNC: 7 MMOL/L (ref 8–16)
AST SERPL-CCNC: 27 U/L (ref 10–40)
BILIRUB SERPL-MCNC: 0.8 MG/DL (ref 0.1–1)
BUN SERPL-MCNC: 18 MG/DL (ref 8–23)
CALCIUM SERPL-MCNC: 9.8 MG/DL (ref 8.7–10.5)
CHLORIDE SERPL-SCNC: 107 MMOL/L (ref 95–110)
CHOLEST SERPL-MCNC: 93 MG/DL (ref 120–199)
CHOLEST/HDLC SERPL: 3.1 {RATIO} (ref 2–5)
CO2 SERPL-SCNC: 26 MMOL/L (ref 23–29)
CREAT SERPL-MCNC: 1.1 MG/DL (ref 0.5–1.4)
CRP SERPL-MCNC: 0.39 MG/L (ref 0–3.19)
EST. GFR  (NO RACE VARIABLE): >60 ML/MIN/1.73 M^2
GLUCOSE SERPL-MCNC: 89 MG/DL (ref 70–110)
HDLC SERPL-MCNC: 30 MG/DL (ref 40–75)
HDLC SERPL: 32.3 % (ref 20–50)
LDLC SERPL CALC-MCNC: 44.6 MG/DL (ref 63–159)
NONHDLC SERPL-MCNC: 63 MG/DL
POTASSIUM SERPL-SCNC: 4.8 MMOL/L (ref 3.5–5.1)
PROT SERPL-MCNC: 6.9 G/DL (ref 6–8.4)
SODIUM SERPL-SCNC: 140 MMOL/L (ref 136–145)
TRIGL SERPL-MCNC: 92 MG/DL (ref 30–150)

## 2024-02-09 PROCEDURE — 36415 COLL VENOUS BLD VENIPUNCTURE: CPT | Performed by: INTERNAL MEDICINE

## 2024-02-09 PROCEDURE — 86141 C-REACTIVE PROTEIN HS: CPT | Performed by: INTERNAL MEDICINE

## 2024-02-09 PROCEDURE — 80053 COMPREHEN METABOLIC PANEL: CPT | Performed by: INTERNAL MEDICINE

## 2024-02-09 PROCEDURE — 80061 LIPID PANEL: CPT | Performed by: INTERNAL MEDICINE

## 2024-02-24 DIAGNOSIS — J30.2 SEASONAL ALLERGIES: ICD-10-CM

## 2024-02-24 NOTE — TELEPHONE ENCOUNTER
No care due was identified.  Health [facility] Embedded Care Due Messages. Reference number: [id number].   2/24/2024 1:30:58 PM CST

## 2024-02-25 NOTE — TELEPHONE ENCOUNTER
Refill Routing Note   Medication(s) are not appropriate for processing by [facility] for the following reason(s):        New or recently adjusted medication    [facility] action(s):  Defer      Medication Therapy Plan: Pharmacy requesting 90 day supply on new medication.      Appointments  past 12m or future 3m with PCP    Date Provider   Last Visit   2/2/2024 Venkata Daniels MD   Next Visit   Visit date not found Venkata Daniels MD   ED visits in past 90 days: 0        Note composed:11:55 PM 02/24/2024

## 2024-02-26 ENCOUNTER — OFFICE VISIT (OUTPATIENT)
Dept: CARDIOLOGY | Facility: CLINIC | Age: 64
End: 2024-02-26
Payer: COMMERCIAL

## 2024-02-26 VITALS
DIASTOLIC BLOOD PRESSURE: 69 MMHG | SYSTOLIC BLOOD PRESSURE: 115 MMHG | HEART RATE: 68 BPM | OXYGEN SATURATION: 98 % | WEIGHT: 189.63 LBS | BODY MASS INDEX: 25.71 KG/M2

## 2024-02-26 DIAGNOSIS — R93.1 AGATSTON CORONARY ARTERY CALCIUM SCORE GREATER THAN 400: ICD-10-CM

## 2024-02-26 DIAGNOSIS — E78.2 MIXED HYPERLIPIDEMIA: ICD-10-CM

## 2024-02-26 DIAGNOSIS — R07.89 ATYPICAL CHEST PAIN: ICD-10-CM

## 2024-02-26 DIAGNOSIS — R94.31 ABNORMAL ECG: ICD-10-CM

## 2024-02-26 DIAGNOSIS — I25.118 CORONARY ARTERY DISEASE OF NATIVE ARTERY OF NATIVE HEART WITH STABLE ANGINA PECTORIS: Primary | ICD-10-CM

## 2024-02-26 DIAGNOSIS — I20.9 AP (ANGINA PECTORIS): ICD-10-CM

## 2024-02-26 DIAGNOSIS — R00.2 PALPITATIONS: ICD-10-CM

## 2024-02-26 DIAGNOSIS — Z98.61 HISTORY OF PTCA: ICD-10-CM

## 2024-02-26 PROCEDURE — 3074F SYST BP LT 130 MM HG: CPT | Mod: CPTII,S$GLB,, | Performed by: INTERNAL MEDICINE

## 2024-02-26 PROCEDURE — 99999 PR PBB SHADOW E&M-EST. PATIENT-LVL III: CPT | Mod: PBBFAC,,, | Performed by: INTERNAL MEDICINE

## 2024-02-26 PROCEDURE — 99214 OFFICE O/P EST MOD 30 MIN: CPT | Mod: S$GLB,,, | Performed by: INTERNAL MEDICINE

## 2024-02-26 PROCEDURE — 1159F MED LIST DOCD IN RCRD: CPT | Mod: CPTII,S$GLB,, | Performed by: INTERNAL MEDICINE

## 2024-02-26 PROCEDURE — 3008F BODY MASS INDEX DOCD: CPT | Mod: CPTII,S$GLB,, | Performed by: INTERNAL MEDICINE

## 2024-02-26 PROCEDURE — 1160F RVW MEDS BY RX/DR IN RCRD: CPT | Mod: CPTII,S$GLB,, | Performed by: INTERNAL MEDICINE

## 2024-02-26 PROCEDURE — 3078F DIAST BP <80 MM HG: CPT | Mod: CPTII,S$GLB,, | Performed by: INTERNAL MEDICINE

## 2024-02-26 RX ORDER — FLUTICASONE PROPIONATE 50 MCG
SPRAY, SUSPENSION (ML) NASAL
Qty: 48 ML | Refills: 1 | Status: SHIPPED | OUTPATIENT
Start: 2024-02-26

## 2024-02-26 NOTE — PROGRESS NOTES
Subjective:    Patient ID:  Hernan Fields is a 63 y.o. male who presents for evaluation of Hyperlipidemia, Coronary Artery Disease, and Risk Factor Management For Atherosclerosis        HPI: Pt presents for eval.  His current medical conditions include CAD, dyslipidemia, HTN.   Nonsmoker.   + family h/o CAD.   Past hx pertinent for following:  H/o abnormal calcium score 1087 in 2014.   H/o  chronic atypical cp sxs  S/p PCI RCA NADINE x 2 June 2016; calcified nonobstructive LAD/left main disease as well, normal LVEF.  Stress echo 9/18 good exercise capacity, no wall motion abnormality, normal LV function.  ecg 10/19/21 NSR, nonspecific st abnl.  Stress echo 9/21 reviewed: good exercise capacity, exercised 11 minutes, no ecg changes, no ischemia on echo images.  Echo normal LV function.  1 week Vital Holter 5/23: NSR, rare ectopy.  Did note some sxs with some PVCs though.  Echo May 2023: normal LV function.  Stress MPI May 2023: good exercise capacity, no ischemia, normal EF.  Now here.  Angina controlled on current med tx.  No change in pattern.  No typical angina.  Atypical CP unchanged pattern.  No CHF sxs.  Not enough exercise right now.  Palpitations not bothering him right now.  Lipids LDL well controlled.  On statin tx.  BP controlled.          Past Medical History:   Diagnosis Date    Allergy     Anxiety     public speaking issues.    Atypical chest pain 11/05/2018    Coronary artery disease 12/15/2014    dr alexandra    Elevated PSA     Ex-smoker     one year at 20 y/o    False positive serological test for hepatitis C     H/O: Bell's palsy     affects left side    History of PTCA 09/12/2016    Hypertension     Meningitis     Mixed hyperlipidemia 12/15/2014     Current Outpatient Medications   Medication Instructions    albuterol (PROVENTIL/VENTOLIN HFA) 90 mcg/actuation inhaler 2 puffs, Inhalation, Every 6 hours PRN    amLODIPine (NORVASC) 5 mg, Oral, Daily    aspirin (ECOTRIN) 81 MG EC tablet Take by mouth. 1  Tablet, Delayed Release (E.C.) Oral Every day    atorvastatin (LIPITOR) 80 mg, Oral, Nightly    ciclopirox (LOPROX) 0.77 % Crea Topical (Top), 2 times daily    clonazePAM (KLONOPIN) 0.5 mg, Oral, 2 times daily PRN    fluticasone propionate (FLONASE) 50 mcg/actuation nasal spray SPRAY 2 SPRAYS BY EACH NOSTRIL ROUTE ONCE DAILY. FOR NASAL CONGESTION OR RUNNY NOSE    metoprolol succinate (TOPROL-XL) 50 mg, Oral, Daily    mupirocin (BACTROBAN) 2 % ointment Topical (Top), 3 times daily    nitroGLYCERIN (NITROSTAT) 0.4 mg, Sublingual, Every 5 min PRN    ranolazine (RANEXA) 500 MG Tb12 Take 1 pill1 in am and1 pill in pm    sildenafiL (VIAGRA) 100 MG tablet 1/2 to 1 tab po qd prn erectile dysfunction.    valacyclovir (VALTREX) 500 MG tablet 1 tablet, Oral, Daily         Review of Systems   Constitutional: Negative.   HENT: Negative.     Eyes: Negative.    Cardiovascular:  Positive for chest pain and palpitations.   Respiratory: Negative.     Endocrine: Negative.    Hematologic/Lymphatic: Negative.    Skin: Negative.    Musculoskeletal: Negative.    Gastrointestinal: Negative.    Genitourinary: Negative.    Neurological: Negative.    Psychiatric/Behavioral: Negative.     Allergic/Immunologic: Negative.           /69 (BP Location: Left arm, Patient Position: Sitting, BP Method: Small (Automatic))   Pulse 68   Wt 86 kg (189 lb 9.5 oz)   SpO2 98%   BMI 25.71 kg/m²     Wt Readings from Last 3 Encounters:   02/26/24 86 kg (189 lb 9.5 oz)   02/02/24 85.7 kg (188 lb 15 oz)   01/05/24 84.3 kg (185 lb 13.6 oz)     Temp Readings from Last 3 Encounters:   02/02/24 97.7 °F (36.5 °C) (Tympanic)   01/05/24 98.6 °F (37 °C) (Tympanic)   06/21/23 97.2 °F (36.2 °C) (Temporal)     BP Readings from Last 3 Encounters:   02/26/24 115/69   02/02/24 138/84   01/05/24 120/82     Pulse Readings from Last 3 Encounters:   02/26/24 68   02/02/24 72   01/05/24 96       Objective:    Physical Exam  Vitals and nursing note reviewed.    Constitutional:       Appearance: He is well-developed.   HENT:      Head: Normocephalic.   Neck:      Thyroid: No thyromegaly.      Vascular: No carotid bruit or JVD.   Cardiovascular:      Rate and Rhythm: Normal rate and regular rhythm.      Pulses:           Radial pulses are 2+ on the right side and 2+ on the left side.      Heart sounds: S1 normal and S2 normal. Heart sounds not distant. No midsystolic click and no opening snap. No murmur heard.     No friction rub. No S3 or S4 sounds.   Pulmonary:      Effort: Pulmonary effort is normal.      Breath sounds: Normal breath sounds. No wheezing or rales.   Abdominal:      General: Bowel sounds are normal. There is no distension or abdominal bruit.      Palpations: Abdomen is soft.      Tenderness: There is no abdominal tenderness.   Musculoskeletal:      Cervical back: Neck supple.   Skin:     General: Skin is warm.   Neurological:      Mental Status: He is alert and oriented to person, place, and time.   Psychiatric:         Behavior: Behavior normal.         I have reviewed all pertinent labs and cardiac studies.    Component      Latest Ref Rng 2/9/2024   High Sens CRP      0.00 - 3.19 mg/L 0.39            Chemistry        Component Value Date/Time     02/09/2024 0724    K 4.8 02/09/2024 0724     02/09/2024 0724    CO2 26 02/09/2024 0724    BUN 18 02/09/2024 0724    CREATININE 1.1 02/09/2024 0724    GLU 89 02/09/2024 0724        Component Value Date/Time    CALCIUM 9.8 02/09/2024 0724    ALKPHOS 75 02/09/2024 0724    AST 27 02/09/2024 0724    ALT 33 02/09/2024 0724    BILITOT 0.8 02/09/2024 0724    ESTGFRAFRICA >60.0 06/09/2022 0819    ESTGFRAFRICA >60.0 06/09/2022 0819    EGFRNONAA >60.0 06/09/2022 0819    EGFRNONAA >60.0 06/09/2022 0819        Lab Results   Component Value Date    WBC 4.92 05/18/2023    HGB 15.1 05/18/2023    HCT 42.4 05/18/2023    MCV 95 05/18/2023     05/18/2023       Lab Results   Component Value Date    HGBA1C 4.9  05/18/2023     Lab Results   Component Value Date    CHOL 93 (L) 02/09/2024    CHOL 96 (L) 05/18/2023    CHOL 123 11/07/2022     Lab Results   Component Value Date    HDL 30 (L) 02/09/2024    HDL 37 (L) 05/18/2023    HDL 47 11/07/2022     Lab Results   Component Value Date    LDLCALC 44.6 (L) 02/09/2024    LDLCALC 46.2 (L) 05/18/2023    LDLCALC 53.4 (L) 11/07/2022     Lab Results   Component Value Date    TRIG 92 02/09/2024    TRIG 64 05/18/2023    TRIG 113 11/07/2022     Lab Results   Component Value Date    CHOLHDL 32.3 02/09/2024    CHOLHDL 38.5 05/18/2023    CHOLHDL 38.2 11/07/2022     Results for orders placed during the hospital encounter of 05/31/23    Echo    Interpretation Summary  · The left ventricle is normal in size with normal systolic function.  · The estimated ejection fraction is 65%.  · Normal left ventricular diastolic function.  · Normal right ventricular size with normal right ventricular systolic function.  · Normal central venous pressure (3 mmHg).  · The estimated PA systolic pressure is 25 mmHg.      Results for orders placed during the hospital encounter of 06/15/23    Nuclear Stress - Cardiology Interpreted    Interpretation Summary    The patient exercised for 10 minutes 2 seconds on a Hany protocol, achieving a peak heart rate of 122 bpm, which is 76 % of the age predicted maximum heart rate.    Normal myocardial perfusion scan. There is no evidence of myocardial ischemia or infarction.    The gated perfusion images showed an ejection fraction of 64% at rest. The gated perfusion images showed an ejection fraction of 70% post stress.    There is normal wall motion at rest and post stress.    LV cavity size is normal at rest and normal at stress.    The ECG portion of the study is negative for ischemia.    The patient reported no chest pain during the stress test.    Changed to walking lexiscan due to inability to reach target HR. Dyspnea and fatigue occurred with stress- resolved during  recovery. No chest pain.        Assessment:       1. Coronary artery disease of native artery of native heart with stable angina pectoris    2. AP (angina pectoris)    3. History of PTCA    4. Mixed hyperlipidemia    5. Palpitations    6. Agatston coronary artery calcium score greater than 400    7. Abnormal ECG    8. Atypical chest pain         Plan:             Stable CV status on current med tx.  CAD: stable. No ischemia on June 2023 stress test.  Continue OMT for CAD.  Ranexa 500 mg bid.  Sublingual nitroglycerin 0.4 mg for concerning chest pain episodes or breakthrough angina.  Patient advised of indications, side effects of nitroglycerin and when to report to ER.  Palpitations: stable. PVCs on 6/23 Holter but rare.  Continue beta-blocker and observation.  Reviewed all tests and above medical conditions with patient in detail and formulated treatment plan.  Continue optimal medical treatment for cardiovascular conditions.  Cardiac low salt diet advised.  Daily exercise encouraged, with the goal 30 +  minutes aerobic exercise as tolerated.  Maintaining healthy weight and weight loss goals (if needed) were discussed in clinic.  HTN: Need for BP control and HTN goals (if needed) were discussed and tx plan formulated.  Goal < 130/80.  Continue current HTN meds.  Lipids:Importance of optimal lipid control were discussed in detail as well as possible pharmacologic and lifestyle changes that may be needed.  Continue statin tx.  Abnl ecg: stable. Monitor.    Carotid u/s -- phone review.    F/u 6 months with fasting labs.      I have reviewed all pertinent labs and cardiac studies independently. Plans and recommendations have been formulated under my direct supervision. All questions answered and patient voiced understanding.

## 2024-02-27 DIAGNOSIS — N52.8 OTHER MALE ERECTILE DYSFUNCTION: ICD-10-CM

## 2024-02-27 DIAGNOSIS — F41.9 ANXIETY: ICD-10-CM

## 2024-02-27 DIAGNOSIS — J98.01 BRONCHOSPASM: ICD-10-CM

## 2024-02-28 NOTE — TELEPHONE ENCOUNTER
No care due was identified.  [facility] Embedded Care Due Messages. Reference number: [id number].   2/27/2024 7:31:10 PM CST

## 2024-02-29 DIAGNOSIS — J98.01 BRONCHOSPASM: ICD-10-CM

## 2024-02-29 RX ORDER — CLONAZEPAM 0.5 MG/1
0.5 TABLET ORAL DAILY PRN
Qty: 15 TABLET | Refills: 0 | Status: SHIPPED | OUTPATIENT
Start: 2024-02-29 | End: 2024-03-30

## 2024-02-29 RX ORDER — SILDENAFIL 100 MG/1
TABLET, FILM COATED ORAL
Qty: 10 TABLET | Refills: 6 | Status: SHIPPED | OUTPATIENT
Start: 2024-02-29

## 2024-02-29 RX ORDER — ALBUTEROL SULFATE 90 UG/1
2 AEROSOL, METERED RESPIRATORY (INHALATION) EVERY 6 HOURS PRN
Qty: 18 G | Refills: 0 | Status: SHIPPED | OUTPATIENT
Start: 2024-02-29 | End: 2024-03-11

## 2024-02-29 RX ORDER — ALBUTEROL SULFATE 90 UG/1
2 AEROSOL, METERED RESPIRATORY (INHALATION) EVERY 6 HOURS PRN
Qty: 18 G | Refills: 0 | Status: SHIPPED | OUTPATIENT
Start: 2024-02-29 | End: 2024-02-29

## 2024-02-29 RX ORDER — LEVALBUTEROL TARTRATE 45 UG/1
AEROSOL, METERED ORAL
Refills: 0 | OUTPATIENT
Start: 2024-02-29

## 2024-02-29 NOTE — TELEPHONE ENCOUNTER
No care due was identified.  [facility] Embedded Care Due Messages. Reference number: [id number].   2/29/2024 5:46:32 PM CST

## 2024-03-01 NOTE — TELEPHONE ENCOUNTER
Refill Decision Note   Hernan Fields  is requesting a refill authorization.  Brief Assessment and Rationale for Refill:  Quick Discontinue     Medication Therapy Plan:  No sig   Pharmacy is requesting new scripts for the following medications without required information, (sig/ frequency/qty/etc)      Medication Reconciliation Completed: No     Comments: Pharmacies have been requesting medications for patients without required information, (sig, frequency, qty, etc.). In addition, requests are sent for medication(s) pt. are currently not taking, and medications patients have never taken.    We have spoken to the pharmacies about these request types and advised their teams previously that we are unable to assess these New Script requests and require all details for these requests. This is a known issue and has been reported.     Note composed:8:10 PM 02/29/2024

## 2024-03-01 NOTE — TELEPHONE ENCOUNTER
No care due was identified.  [facility] Embedded Care Due Messages. Reference number: [id number].   2/29/2024 7:53:20 PM CST

## 2024-03-01 NOTE — TELEPHONE ENCOUNTER
Refill Decision Note   Hernan Fields  is requesting a refill authorization.  Brief Assessment and Rationale for Refill:  Approve     Medication Therapy Plan:  Changed to a different albuterol inhaler for insurance purposes.  Previously prescribed by provider      Comments:     Note composed:7:25 PM 02/29/2024

## 2024-03-05 DIAGNOSIS — I25.83 CORONARY ARTERY DISEASE DUE TO LIPID RICH PLAQUE: ICD-10-CM

## 2024-03-05 DIAGNOSIS — I25.10 CORONARY ARTERY DISEASE DUE TO LIPID RICH PLAQUE: ICD-10-CM

## 2024-03-05 RX ORDER — ATORVASTATIN CALCIUM 80 MG/1
80 TABLET, FILM COATED ORAL NIGHTLY
Qty: 90 TABLET | Refills: 3 | Status: SHIPPED | OUTPATIENT
Start: 2024-03-05

## 2024-03-05 NOTE — TELEPHONE ENCOUNTER
No care due was identified.  [facility] Embedded Care Due Messages. Reference number: [id number].   3/05/2024 5:18:15 AM CST

## 2024-03-05 NOTE — TELEPHONE ENCOUNTER
Refill Decision Note   Hernan Fields  is requesting a refill authorization.  Brief Assessment and Rationale for Refill:  Approve     Medication Therapy Plan:         Comments:     Note composed:9:51 AM 03/05/2024

## 2024-03-09 DIAGNOSIS — J98.01 BRONCHOSPASM: ICD-10-CM

## 2024-03-09 NOTE — TELEPHONE ENCOUNTER
No care due was identified.  [facility] Embedded Care Due Messages. Reference number: [id number].   3/09/2024 5:07:58 PM CST

## 2024-03-11 RX ORDER — LEVALBUTEROL TARTRATE 45 UG/1
2 AEROSOL, METERED ORAL EVERY 4 HOURS PRN
Qty: 45 G | Refills: 1 | Status: SHIPPED | OUTPATIENT
Start: 2024-03-11

## 2024-03-11 NOTE — TELEPHONE ENCOUNTER
Refill Routing Note   Medication(s) are not appropriate for processing by [facility] for the following reason(s):        Med affordability; FORMULARY ALTERNATIVE REQUESTED    [facility] action(s):  Defer        Medication Therapy Plan: VENTOLIN HFA WAS PREVIOUSLY PRESCRIBED. PHARMACY IS STATING NOT COVERED. THEY HAVE PENDED XOPENEX HFA FOR YOUR REVIEW.      Appointments  past 12m or future 3m with PCP    Date Provider   Last Visit   2/2/2024 Venkata Daniels MD   Next Visit   Visit date not found Venkata Daniels MD   ED visits in past 90 days: 0        Note composed:10:39 PM 03/10/2024

## 2024-03-18 ENCOUNTER — HOSPITAL ENCOUNTER (OUTPATIENT)
Dept: CARDIOLOGY | Facility: HOSPITAL | Age: 64
Discharge: HOME | End: 2024-03-18
Attending: INTERNAL MEDICINE
Payer: COMMERCIAL

## 2024-03-18 VITALS
WEIGHT: 189 LBS | HEIGHT: 72 IN | BODY MASS INDEX: 25.6 KG/M2 | DIASTOLIC BLOOD PRESSURE: 75 MMHG | SYSTOLIC BLOOD PRESSURE: 130 MMHG

## 2024-03-18 DIAGNOSIS — I20.9 AP (ANGINA PECTORIS): ICD-10-CM

## 2024-03-18 DIAGNOSIS — R07.89 ATYPICAL CHEST PAIN: ICD-10-CM

## 2024-03-18 DIAGNOSIS — R94.31 ABNORMAL ECG: ICD-10-CM

## 2024-03-18 DIAGNOSIS — R00.2 PALPITATIONS: ICD-10-CM

## 2024-03-18 DIAGNOSIS — Z98.61 HISTORY OF PTCA: ICD-10-CM

## 2024-03-18 DIAGNOSIS — R93.1 AGATSTON CORONARY ARTERY CALCIUM SCORE GREATER THAN 400: ICD-10-CM

## 2024-03-18 DIAGNOSIS — I25.118 CORONARY ARTERY DISEASE OF NATIVE ARTERY OF NATIVE HEART WITH STABLE ANGINA PECTORIS: ICD-10-CM

## 2024-03-18 LAB
LEFT ARM DIASTOLIC BLOOD PRESSURE: 75 MMHG
LEFT ARM SYSTOLIC BLOOD PRESSURE: 130 MMHG
LEFT CBA DIAS: 27 CM/S
LEFT CBA SYS: 86 CM/S
LEFT CCA DIST DIAS: 29 CM/S
LEFT CCA DIST SYS: 104 CM/S
LEFT CCA MID DIAS: 25 CM/S
LEFT CCA MID SYS: 100 CM/S
LEFT CCA PROX DIAS: 22 CM/S
LEFT CCA PROX SYS: 111 CM/S
LEFT ECA DIAS: 12 CM/S
LEFT ECA SYS: 83 CM/S
LEFT ICA DIST DIAS: 27 CM/S
LEFT ICA DIST SYS: 73 CM/S
LEFT ICA MID DIAS: 28 CM/S
LEFT ICA MID SYS: 86 CM/S
LEFT ICA PROX DIAS: 23 CM/S
LEFT ICA PROX SYS: 102 CM/S
LEFT VERTEBRAL DIAS: 21 CM/S
LEFT VERTEBRAL SYS: 56 CM/S
OHS CV CAROTID RIGHT ICA EDV HIGHEST: 74
OHS CV CAROTID ULTRASOUND LEFT ICA/CCA RATIO: 0.98
OHS CV CAROTID ULTRASOUND RIGHT ICA/CCA RATIO: 2.39
OHS CV PV CAROTID LEFT HIGHEST CCA: 111
OHS CV PV CAROTID LEFT HIGHEST ICA: 102
OHS CV PV CAROTID RIGHT HIGHEST CCA: 135
OHS CV PV CAROTID RIGHT HIGHEST ICA: 227
OHS CV US CAROTID LEFT HIGHEST EDV: 28
RIGHT ARM DIASTOLIC BLOOD PRESSURE: 76 MMHG
RIGHT ARM SYSTOLIC BLOOD PRESSURE: 115 MMHG
RIGHT CBA DIAS: 28 CM/S
RIGHT CBA SYS: 131 CM/S
RIGHT CCA DIST DIAS: 22 CM/S
RIGHT CCA DIST SYS: 95 CM/S
RIGHT CCA MID DIAS: 20 CM/S
RIGHT CCA MID SYS: 92 CM/S
RIGHT CCA PROX DIAS: 14 CM/S
RIGHT CCA PROX SYS: 135 CM/S
RIGHT ECA DIAS: 7 CM/S
RIGHT ECA SYS: 96 CM/S
RIGHT ICA DIST DIAS: 24 CM/S
RIGHT ICA DIST SYS: 64 CM/S
RIGHT ICA MID DIAS: 44 CM/S
RIGHT ICA MID SYS: 145 CM/S
RIGHT ICA PROX DIAS: 74 CM/S
RIGHT ICA PROX SYS: 227 CM/S
RIGHT VERTEBRAL DIAS: 10 CM/S
RIGHT VERTEBRAL SYS: 44 CM/S

## 2024-03-18 PROCEDURE — 93880 EXTRACRANIAL BILAT STUDY: CPT | Mod: 26,,, | Performed by: INTERNAL MEDICINE

## 2024-03-18 PROCEDURE — 93880 EXTRACRANIAL BILAT STUDY: CPT

## 2024-05-02 DIAGNOSIS — I25.83 CORONARY ARTERY DISEASE DUE TO LIPID RICH PLAQUE: ICD-10-CM

## 2024-05-02 DIAGNOSIS — I25.10 CORONARY ARTERY DISEASE DUE TO LIPID RICH PLAQUE: ICD-10-CM

## 2024-05-02 RX ORDER — RANOLAZINE 500 MG/1
TABLET, EXTENDED RELEASE ORAL
Qty: 180 TABLET | Refills: 5 | Status: SHIPPED | OUTPATIENT
Start: 2024-05-02

## 2024-05-28 DIAGNOSIS — I10 ESSENTIAL HYPERTENSION: Chronic | ICD-10-CM

## 2024-05-28 RX ORDER — METOPROLOL SUCCINATE 50 MG/1
50 TABLET, EXTENDED RELEASE ORAL
Qty: 90 TABLET | Refills: 2 | Status: SHIPPED | OUTPATIENT
Start: 2024-05-28

## 2024-05-28 NOTE — TELEPHONE ENCOUNTER
Refill Decision Note   Hernan Diamond  is requesting a refill authorization.  Brief Assessment and Rationale for Refill:  Approve     Medication Therapy Plan:         Comments:     Note composed:6:32 PM 05/28/2024

## 2024-05-28 NOTE — TELEPHONE ENCOUNTER
No care due was identified.  [facility] Embedded Care Due Messages. Reference number: [id number].   5/28/2024 3:07:34 PM CDT

## 2024-07-22 ENCOUNTER — OFFICE VISIT (OUTPATIENT)
Dept: INTERNAL MEDICINE | Facility: CLINIC | Age: 64
End: 2024-07-22
Payer: COMMERCIAL

## 2024-07-22 VITALS
DIASTOLIC BLOOD PRESSURE: 84 MMHG | HEART RATE: 94 BPM | BODY MASS INDEX: 24.58 KG/M2 | SYSTOLIC BLOOD PRESSURE: 130 MMHG | HEIGHT: 73 IN | TEMPERATURE: 99 F | WEIGHT: 185.44 LBS | OXYGEN SATURATION: 97 %

## 2024-07-22 DIAGNOSIS — J01.90 ACUTE SINUSITIS, RECURRENCE NOT SPECIFIED, UNSPECIFIED LOCATION: Primary | ICD-10-CM

## 2024-07-22 PROCEDURE — 3079F DIAST BP 80-89 MM HG: CPT | Mod: CPTII,S$GLB,, | Performed by: NURSE PRACTITIONER

## 2024-07-22 PROCEDURE — 1159F MED LIST DOCD IN RCRD: CPT | Mod: CPTII,S$GLB,, | Performed by: NURSE PRACTITIONER

## 2024-07-22 PROCEDURE — 99213 OFFICE O/P EST LOW 20 MIN: CPT | Mod: S$GLB,,, | Performed by: NURSE PRACTITIONER

## 2024-07-22 PROCEDURE — 3075F SYST BP GE 130 - 139MM HG: CPT | Mod: CPTII,S$GLB,, | Performed by: NURSE PRACTITIONER

## 2024-07-22 PROCEDURE — 1160F RVW MEDS BY RX/DR IN RCRD: CPT | Mod: CPTII,S$GLB,, | Performed by: NURSE PRACTITIONER

## 2024-07-22 PROCEDURE — 99999 PR PBB SHADOW E&M-EST. PATIENT-LVL IV: CPT | Mod: PBBFAC,,, | Performed by: NURSE PRACTITIONER

## 2024-07-22 PROCEDURE — 3008F BODY MASS INDEX DOCD: CPT | Mod: CPTII,S$GLB,, | Performed by: NURSE PRACTITIONER

## 2024-07-22 RX ORDER — DOXYCYCLINE 100 MG/1
100 CAPSULE ORAL EVERY 12 HOURS
Qty: 14 CAPSULE | Refills: 0 | Status: SHIPPED | OUTPATIENT
Start: 2024-07-22 | End: 2024-07-29

## 2024-07-22 NOTE — PROGRESS NOTES
"Subjective:       Patient ID: Hernan Fields is a 64 y.o. male.    Chief Complaint: URI (Started last week ) and Hypertension (148 88 )    Pt presents to clinic today for congestion, nasal drainage, headache, and elevated blood pressure readings  Started feeling bad Wednesday last week   Coughing, congestion, sneezing  Taking Nyquil and Nyquil   Tmax 100  Covid home test negative  Pt concerned because he noticed his BP was a little high today 148/88  Does not think the cold med he was taking was for people with HTN            /84   Pulse 94   Temp 98.7 °F (37.1 °C) (Tympanic)   Ht 6' 1" (1.854 m)   Wt 84.1 kg (185 lb 6.5 oz)   SpO2 97%   BMI 24.46 kg/m²     Review of Systems   Constitutional:  Negative for activity change, appetite change, chills, diaphoresis, fatigue, fever and unexpected weight change.   HENT:  Positive for congestion, postnasal drip, rhinorrhea and sneezing. Negative for dental problem, drooling, ear discharge, ear pain, facial swelling, hearing loss, mouth sores, nosebleeds, sinus pressure, sore throat, tinnitus, trouble swallowing and voice change.    Eyes:  Negative for photophobia, pain, discharge, redness, itching and visual disturbance.   Respiratory:  Positive for cough. Negative for apnea, choking, chest tightness, shortness of breath and wheezing.    Cardiovascular:  Negative for chest pain, palpitations and leg swelling.   Gastrointestinal:  Negative for abdominal distention, abdominal pain, anal bleeding, blood in stool, constipation, diarrhea, nausea, rectal pain and vomiting.   Endocrine: Negative.    Genitourinary:  Negative for decreased urine volume, difficulty urinating, dysuria, hematuria and urgency.   Musculoskeletal:  Negative for arthralgias, gait problem, joint swelling, myalgias, neck pain and neck stiffness.   Skin:  Negative for color change, pallor, rash and wound.   Allergic/Immunologic: Negative for environmental allergies, food allergies and " immunocompromised state.   Neurological:  Negative for dizziness, tremors, seizures, syncope, facial asymmetry, speech difficulty, weakness, light-headedness, numbness and headaches.   Hematological:  Negative for adenopathy. Does not bruise/bleed easily.   Psychiatric/Behavioral:  Negative for agitation, behavioral problems, confusion, decreased concentration, dysphoric mood, hallucinations and sleep disturbance. The patient is not nervous/anxious and is not hyperactive.        Objective:      Physical Exam  Vitals and nursing note reviewed.   Constitutional:       Appearance: He is well-developed.   HENT:      Head: Normocephalic and atraumatic.      Right Ear: Tympanic membrane, ear canal and external ear normal. No decreased hearing noted.      Left Ear: Tympanic membrane, ear canal and external ear normal. No decreased hearing noted.      Nose: Mucosal edema and rhinorrhea present.      Right Sinus: Frontal sinus tenderness present. No maxillary sinus tenderness.      Left Sinus: Frontal sinus tenderness present. No maxillary sinus tenderness.      Mouth/Throat:      Pharynx: Uvula midline. No oropharyngeal exudate or posterior oropharyngeal erythema.      Tonsils: No tonsillar abscesses.   Eyes:      General:         Right eye: No discharge.         Left eye: No discharge.      Conjunctiva/sclera: Conjunctivae normal.   Cardiovascular:      Rate and Rhythm: Normal rate and regular rhythm.      Heart sounds: Normal heart sounds. No murmur heard.  Pulmonary:      Effort: Pulmonary effort is normal. No accessory muscle usage or respiratory distress.      Breath sounds: No decreased breath sounds, wheezing, rhonchi or rales.   Chest:      Chest wall: No tenderness.   Abdominal:      Palpations: Abdomen is soft.      Tenderness: There is no abdominal tenderness.   Musculoskeletal:         General: Normal range of motion.      Cervical back: Normal range of motion.   Skin:     General: Skin is warm and dry.       Findings: No erythema.   Neurological:      Mental Status: He is alert and oriented to person, place, and time.   Psychiatric:         Behavior: Behavior normal.         Assessment:       1. Acute sinusitis, recurrence not specified, unspecified location    2. BMI 24.0-24.9, adult        Plan:       Hernan was seen today for uri and hypertension.    Diagnoses and all orders for this visit:    Acute sinusitis, recurrence not specified, unspecified location  -     doxycycline (VIBRAMYCIN) 100 MG Cap; Take 1 capsule (100 mg total) by mouth every 12 (twelve) hours. for 7 days    BMI 24.0-24.9, adult      OTC meds safe for HTN such as coricidin  Start Flonase Nasal Steroid Spray 2 sprays each nare daily to decrease the sinus inflammation and help dry the post nasal drip.  Follow up for worsening or no improvement in symptoms and PRN.

## 2024-08-09 ENCOUNTER — LAB VISIT (OUTPATIENT)
Dept: LAB | Facility: HOSPITAL | Age: 64
End: 2024-08-09
Attending: INTERNAL MEDICINE
Payer: COMMERCIAL

## 2024-08-09 DIAGNOSIS — I25.118 CORONARY ARTERY DISEASE OF NATIVE ARTERY OF NATIVE HEART WITH STABLE ANGINA PECTORIS: ICD-10-CM

## 2024-08-09 DIAGNOSIS — E78.2 MIXED HYPERLIPIDEMIA: ICD-10-CM

## 2024-08-09 LAB
ALBUMIN SERPL BCP-MCNC: 4 G/DL (ref 3.5–5.2)
ALP SERPL-CCNC: 92 U/L (ref 55–135)
ALT SERPL W/O P-5'-P-CCNC: 48 U/L (ref 10–44)
ANION GAP SERPL CALC-SCNC: 10 MMOL/L (ref 8–16)
AST SERPL-CCNC: 24 U/L (ref 10–40)
BILIRUB SERPL-MCNC: 0.6 MG/DL (ref 0.1–1)
BUN SERPL-MCNC: 17 MG/DL (ref 8–23)
CALCIUM SERPL-MCNC: 9.7 MG/DL (ref 8.7–10.5)
CHLORIDE SERPL-SCNC: 107 MMOL/L (ref 95–110)
CHOLEST SERPL-MCNC: 110 MG/DL (ref 120–199)
CHOLEST/HDLC SERPL: 3.1 {RATIO} (ref 2–5)
CO2 SERPL-SCNC: 26 MMOL/L (ref 23–29)
CREAT SERPL-MCNC: 0.9 MG/DL (ref 0.5–1.4)
CRP SERPL-MCNC: 0.65 MG/L (ref 0–3.19)
EST. GFR  (NO RACE VARIABLE): >60 ML/MIN/1.73 M^2
GLUCOSE SERPL-MCNC: 91 MG/DL (ref 70–110)
HDLC SERPL-MCNC: 36 MG/DL (ref 40–75)
HDLC SERPL: 32.7 % (ref 20–50)
LDLC SERPL CALC-MCNC: 58.2 MG/DL (ref 63–159)
NONHDLC SERPL-MCNC: 74 MG/DL
POTASSIUM SERPL-SCNC: 4.9 MMOL/L (ref 3.5–5.1)
PROT SERPL-MCNC: 6.8 G/DL (ref 6–8.4)
SODIUM SERPL-SCNC: 143 MMOL/L (ref 136–145)
TRIGL SERPL-MCNC: 79 MG/DL (ref 30–150)

## 2024-08-09 PROCEDURE — 80061 LIPID PANEL: CPT | Performed by: INTERNAL MEDICINE

## 2024-08-09 PROCEDURE — 86141 C-REACTIVE PROTEIN HS: CPT | Performed by: INTERNAL MEDICINE

## 2024-08-09 PROCEDURE — 80053 COMPREHEN METABOLIC PANEL: CPT | Performed by: INTERNAL MEDICINE

## 2024-08-09 PROCEDURE — 36415 COLL VENOUS BLD VENIPUNCTURE: CPT | Performed by: INTERNAL MEDICINE

## 2024-08-15 DIAGNOSIS — Z00.00 ROUTINE GENERAL MEDICAL EXAMINATION AT A HEALTH CARE FACILITY: Primary | ICD-10-CM

## 2024-08-26 ENCOUNTER — HOSPITAL ENCOUNTER (OUTPATIENT)
Dept: CARDIOLOGY | Facility: HOSPITAL | Age: 64
Discharge: HOME | End: 2024-08-26
Attending: INTERNAL MEDICINE
Payer: COMMERCIAL

## 2024-08-26 ENCOUNTER — OFFICE VISIT (OUTPATIENT)
Dept: CARDIOLOGY | Facility: CLINIC | Age: 64
End: 2024-08-26
Payer: COMMERCIAL

## 2024-08-26 VITALS
DIASTOLIC BLOOD PRESSURE: 72 MMHG | WEIGHT: 184.94 LBS | SYSTOLIC BLOOD PRESSURE: 124 MMHG | BODY MASS INDEX: 24.51 KG/M2 | OXYGEN SATURATION: 98 % | HEART RATE: 65 BPM | RESPIRATION RATE: 16 BRPM | HEIGHT: 73 IN

## 2024-08-26 DIAGNOSIS — Z98.61 HISTORY OF PTCA: ICD-10-CM

## 2024-08-26 DIAGNOSIS — Z00.00 ROUTINE GENERAL MEDICAL EXAMINATION AT A HEALTH CARE FACILITY: ICD-10-CM

## 2024-08-26 DIAGNOSIS — I10 PRIMARY HYPERTENSION: Chronic | ICD-10-CM

## 2024-08-26 DIAGNOSIS — R94.31 ABNORMAL ECG: ICD-10-CM

## 2024-08-26 DIAGNOSIS — E78.2 MIXED HYPERLIPIDEMIA: ICD-10-CM

## 2024-08-26 DIAGNOSIS — I65.23 ASYMPTOMATIC STENOSIS OF BOTH CAROTID ARTERIES WITHOUT INFARCTION: ICD-10-CM

## 2024-08-26 DIAGNOSIS — R07.89 ATYPICAL CHEST PAIN: ICD-10-CM

## 2024-08-26 DIAGNOSIS — R93.1 AGATSTON CORONARY ARTERY CALCIUM SCORE GREATER THAN 400: ICD-10-CM

## 2024-08-26 DIAGNOSIS — I25.118 CORONARY ARTERY DISEASE OF NATIVE ARTERY OF NATIVE HEART WITH STABLE ANGINA PECTORIS: Primary | ICD-10-CM

## 2024-08-26 DIAGNOSIS — R00.2 PALPITATIONS: ICD-10-CM

## 2024-08-26 DIAGNOSIS — I20.9 AP (ANGINA PECTORIS): ICD-10-CM

## 2024-08-26 PROCEDURE — 3078F DIAST BP <80 MM HG: CPT | Mod: CPTII,S$GLB,, | Performed by: INTERNAL MEDICINE

## 2024-08-26 PROCEDURE — 99214 OFFICE O/P EST MOD 30 MIN: CPT | Mod: S$GLB,,, | Performed by: INTERNAL MEDICINE

## 2024-08-26 PROCEDURE — 99999 PR PBB SHADOW E&M-EST. PATIENT-LVL III: CPT | Mod: PBBFAC,,, | Performed by: INTERNAL MEDICINE

## 2024-08-26 PROCEDURE — 3074F SYST BP LT 130 MM HG: CPT | Mod: CPTII,S$GLB,, | Performed by: INTERNAL MEDICINE

## 2024-08-26 PROCEDURE — 1159F MED LIST DOCD IN RCRD: CPT | Mod: CPTII,S$GLB,, | Performed by: INTERNAL MEDICINE

## 2024-08-26 PROCEDURE — 3008F BODY MASS INDEX DOCD: CPT | Mod: CPTII,S$GLB,, | Performed by: INTERNAL MEDICINE

## 2024-08-26 PROCEDURE — 93010 ELECTROCARDIOGRAM REPORT: CPT | Mod: ,,, | Performed by: INTERNAL MEDICINE

## 2024-08-26 PROCEDURE — 1160F RVW MEDS BY RX/DR IN RCRD: CPT | Mod: CPTII,S$GLB,, | Performed by: INTERNAL MEDICINE

## 2024-08-26 PROCEDURE — 93005 ELECTROCARDIOGRAM TRACING: CPT

## 2024-08-26 RX ORDER — AMLODIPINE BESYLATE 5 MG/1
5 TABLET ORAL DAILY
Qty: 90 TABLET | Refills: 3 | Status: SHIPPED | OUTPATIENT
Start: 2024-08-26 | End: 2025-08-26

## 2024-08-26 NOTE — PROGRESS NOTES
Subjective:    Patient ID:  Hernan Fields is a 64 y.o. male who presents for evaluation of Coronary Artery Disease and Hyperlipidemia        HPI: Pt presents for eval.  His current medical conditions include CAD, carotid disease, dyslipidemia, HTN.   Nonsmoker.   + family h/o CAD.   Past hx pertinent for following:  H/o abnormal calcium score 1087 in 2014.   H/o  chronic atypical cp sxs  S/p PCI RCA NADINE x 2 June 2016; calcified nonobstructive LAD/left main disease as well, normal LVEF.  Stress echo 9/18 good exercise capacity, no wall motion abnormality, normal LV function.  ecg 10/19/21 NSR, nonspecific st abnl.  Stress echo 9/21 reviewed: good exercise capacity, exercised 11 minutes, no ecg changes, no ischemia on echo images.  Echo normal LV function.  1 week Vital Holter 5/23: NSR, rare ectopy.  Did note some sxs with some PVCs though.  Echo May 2023: normal LV function.  Stress MPI May 2023: good exercise capacity, no ischemia, normal EF.  Now here for 6 month f/u appt.  Routine carotid u/s March 2024:  50 - 59% RABIA, 20- 39% LICA stenosis.   Med tx and observation advised.  Ecg today 8/26/24 personally reviewed: sinus nile 58 bpm, no ischemia.  Angina controlled on current med tx.  No change in pattern.  No typical angina.  Atypical CP unchanged pattern.  Not bothersome right now.  No CHF sxs.  Not enough exercise right now.  Palpitations not bothering him right now.  LDL is excellent.  No TIA/CVA sxs.      Past Medical History:   Diagnosis Date    Allergy     Anxiety     public speaking issues.    Asymptomatic stenosis of both carotid arteries without infarction 08/26/2024    Atypical chest pain 11/05/2018    Coronary artery disease 12/15/2014    dr alexandra    Elevated PSA     Ex-smoker     one year at 20 y/o    False positive serological test for hepatitis C     H/O: Bell's palsy     affects left side    History of PTCA 09/12/2016    Hypertension     Meningitis     Mixed hyperlipidemia 12/15/2014     Current  "Outpatient Medications   Medication Instructions    amLODIPine (NORVASC) 5 mg, Oral, Daily    aspirin (ECOTRIN) 81 MG EC tablet Take by mouth. 1 Tablet, Delayed Release (E.C.) Oral Every day    atorvastatin (LIPITOR) 80 mg, Oral, Nightly    ciclopirox (LOPROX) 0.77 % Crea Topical (Top), 2 times daily    clonazePAM (KLONOPIN) 0.5 mg, Oral, Daily PRN    fluticasone propionate (FLONASE) 50 mcg/actuation nasal spray SPRAY 2 SPRAYS BY EACH NOSTRIL ROUTE ONCE DAILY. FOR NASAL CONGESTION OR RUNNY NOSE    levalbuterol (XOPENEX HFA) 45 mcg/actuation inhaler 2 puffs, Inhalation, Every 4 hours PRN    metoprolol succinate (TOPROL-XL) 50 mg, Oral    mupirocin (BACTROBAN) 2 % ointment Topical (Top), 3 times daily    nitroGLYCERIN (NITROSTAT) 0.4 mg, Sublingual, Every 5 min PRN    ranolazine (RANEXA) 500 MG Tb12 TAKE 1 TABLET BY MOUTH TWICE A DAY    sildenafiL (VIAGRA) 100 MG tablet 1/2 to 1 tab po qd prn erectile dysfunction.    valacyclovir (VALTREX) 500 MG tablet 1 tablet, Oral, Daily         Review of Systems   Constitutional: Negative.   HENT: Negative.     Eyes: Negative.    Cardiovascular: Negative.    Respiratory: Negative.     Endocrine: Negative.    Hematologic/Lymphatic: Negative.    Skin: Negative.    Musculoskeletal: Negative.    Gastrointestinal: Negative.    Genitourinary: Negative.    Neurological: Negative.    Psychiatric/Behavioral: Negative.     Allergic/Immunologic: Negative.           /72 (BP Location: Left arm, Patient Position: Sitting, BP Method: Medium (Manual))   Pulse 65   Resp 16   Ht 6' 1" (1.854 m)   Wt 83.9 kg (184 lb 15.5 oz)   SpO2 98%   BMI 24.40 kg/m²     Wt Readings from Last 3 Encounters:   08/26/24 83.9 kg (184 lb 15.5 oz)   07/22/24 84.1 kg (185 lb 6.5 oz)   03/18/24 85.7 kg (189 lb)     Temp Readings from Last 3 Encounters:   07/22/24 98.7 °F (37.1 °C) (Tympanic)   02/02/24 97.7 °F (36.5 °C) (Tympanic)   01/05/24 98.6 °F (37 °C) (Tympanic)     BP Readings from Last 3 Encounters: "   08/26/24 124/72   07/22/24 130/84   03/18/24 130/75     Pulse Readings from Last 3 Encounters:   08/26/24 65   07/22/24 94   02/26/24 68       Objective:    Physical Exam  Vitals and nursing note reviewed.   Constitutional:       Appearance: He is well-developed.   HENT:      Head: Normocephalic.   Neck:      Thyroid: No thyromegaly.      Vascular: No carotid bruit or JVD.   Cardiovascular:      Rate and Rhythm: Normal rate and regular rhythm.      Pulses:           Radial pulses are 2+ on the right side and 2+ on the left side.      Heart sounds: S1 normal and S2 normal. Heart sounds not distant. No midsystolic click and no opening snap. No murmur heard.     No friction rub. No S3 or S4 sounds.   Pulmonary:      Effort: Pulmonary effort is normal.      Breath sounds: Normal breath sounds. No wheezing or rales.   Abdominal:      General: Bowel sounds are normal. There is no distension or abdominal bruit.      Palpations: Abdomen is soft.      Tenderness: There is no abdominal tenderness.   Musculoskeletal:      Cervical back: Neck supple.   Skin:     General: Skin is warm.   Neurological:      Mental Status: He is alert and oriented to person, place, and time.   Psychiatric:         Behavior: Behavior normal.         I have reviewed all pertinent labs and cardiac studies.    Component      Latest Ref Rng 8/9/2024   CRP, High Sensitivity      0.00 - 3.19 mg/L 0.65          Component      Latest Ref Rng 2/9/2024   High Sens CRP      0.00 - 3.19 mg/L 0.39            Chemistry        Component Value Date/Time     08/09/2024 0805    K 4.9 08/09/2024 0805     08/09/2024 0805    CO2 26 08/09/2024 0805    BUN 17 08/09/2024 0805    CREATININE 0.9 08/09/2024 0805    GLU 91 08/09/2024 0805        Component Value Date/Time    CALCIUM 9.7 08/09/2024 0805    ALKPHOS 92 08/09/2024 0805    AST 24 08/09/2024 0805    ALT 48 (H) 08/09/2024 0805    BILITOT 0.6 08/09/2024 0805    ESTGFRAFRICA >60.0 06/09/2022 0819     ESTGFRAFRICA >60.0 06/09/2022 0819    EGFRNONAA >60.0 06/09/2022 0819    EGFRNONAA >60.0 06/09/2022 0819        Lab Results   Component Value Date    WBC 4.92 05/18/2023    HGB 15.1 05/18/2023    HCT 42.4 05/18/2023    MCV 95 05/18/2023     05/18/2023       Lab Results   Component Value Date    HGBA1C 4.9 05/18/2023     Lab Results   Component Value Date    CHOL 110 (L) 08/09/2024    CHOL 93 (L) 02/09/2024    CHOL 96 (L) 05/18/2023     Lab Results   Component Value Date    HDL 36 (L) 08/09/2024    HDL 30 (L) 02/09/2024    HDL 37 (L) 05/18/2023     Lab Results   Component Value Date    LDLCALC 58.2 (L) 08/09/2024    LDLCALC 44.6 (L) 02/09/2024    LDLCALC 46.2 (L) 05/18/2023     Lab Results   Component Value Date    TRIG 79 08/09/2024    TRIG 92 02/09/2024    TRIG 64 05/18/2023     Lab Results   Component Value Date    CHOLHDL 32.7 08/09/2024    CHOLHDL 32.3 02/09/2024    CHOLHDL 38.5 05/18/2023     Results for orders placed during the hospital encounter of 05/31/23    Echo    Interpretation Summary  · The left ventricle is normal in size with normal systolic function.  · The estimated ejection fraction is 65%.  · Normal left ventricular diastolic function.  · Normal right ventricular size with normal right ventricular systolic function.  · Normal central venous pressure (3 mmHg).  · The estimated PA systolic pressure is 25 mmHg.      Results for orders placed during the hospital encounter of 06/15/23    Nuclear Stress - Cardiology Interpreted    Interpretation Summary    The patient exercised for 10 minutes 2 seconds on a Hany protocol, achieving a peak heart rate of 122 bpm, which is 76 % of the age predicted maximum heart rate.    Normal myocardial perfusion scan. There is no evidence of myocardial ischemia or infarction.    The gated perfusion images showed an ejection fraction of 64% at rest. The gated perfusion images showed an ejection fraction of 70% post stress.    There is normal wall motion at rest  and post stress.    LV cavity size is normal at rest and normal at stress.    The ECG portion of the study is negative for ischemia.    The patient reported no chest pain during the stress test.    Changed to walking lexiscan due to inability to reach target HR. Dyspnea and fatigue occurred with stress- resolved during recovery. No chest pain.        Assessment:       1. Coronary artery disease of native artery of native heart with stable angina pectoris    2. Abnormal ECG    3. AP (angina pectoris)    4. Agatston coronary artery calcium score greater than 400    5. Mixed hyperlipidemia    6. Palpitations    7. History of PTCA    8. Atypical chest pain    9. Asymptomatic stenosis of both carotid arteries without infarction    10. Primary hypertension         Plan:             Stable CV status on current med tx.  CAD: stable. No ischemia on June 2023 stress test.  Continue OMT for CAD.  Ranexa 500 mg bid.  Sublingual nitroglycerin 0.4 mg for concerning chest pain episodes or breakthrough angina.  Patient advised of indications, side effects of nitroglycerin and when to report to ER.  Palpitations: stable. PVCs on 6/23 Holter but rare.  Continue beta-blocker and observation.  Reviewed all tests and above medical conditions with patient in detail and formulated treatment plan.  Continue optimal medical treatment for cardiovascular conditions.  Cardiac low salt diet advised.  Daily exercise encouraged, with the goal 30 +  minutes aerobic exercise as tolerated.  Maintaining healthy weight and weight loss goals (if needed) were discussed in clinic.  HTN: Need for BP control and HTN goals (if needed) were discussed and tx plan formulated.  Goal < 130/80.  Continue current HTN meds.  Lipids:Importance of optimal lipid control were discussed in detail as well as possible pharmacologic and lifestyle changes that may be needed.  Continue statin tx.  Abnl ecg: stable. Monitor.  Carotid disease: Medical tx and f/u carotid u/s  advised.    F/u 6 months with fasting labs and carotid u/s.      I have reviewed all pertinent labs and cardiac studies independently. Plans and recommendations have been formulated under my direct supervision. All questions answered and patient voiced understanding.

## 2024-08-27 LAB
OHS QRS DURATION: 78 MS
OHS QTC CALCULATION: 410 MS

## 2024-08-30 DIAGNOSIS — Z00.00 ROUTINE GENERAL MEDICAL EXAMINATION AT A HEALTH CARE FACILITY: Primary | ICD-10-CM

## 2024-09-09 ENCOUNTER — OFFICE VISIT (OUTPATIENT)
Dept: INTERNAL MEDICINE | Facility: CLINIC | Age: 64
End: 2024-09-09
Payer: COMMERCIAL

## 2024-09-09 ENCOUNTER — CLINICAL SUPPORT (OUTPATIENT)
Dept: INTERNAL MEDICINE | Facility: CLINIC | Age: 64
End: 2024-09-09
Payer: COMMERCIAL

## 2024-09-09 ENCOUNTER — HOSPITAL ENCOUNTER (OUTPATIENT)
Dept: CARDIOLOGY | Facility: HOSPITAL | Age: 64
Discharge: HOME | End: 2024-09-09
Attending: INTERNAL MEDICINE
Payer: COMMERCIAL

## 2024-09-09 VITALS
DIASTOLIC BLOOD PRESSURE: 73 MMHG | HEIGHT: 72 IN | WEIGHT: 183 LBS | TEMPERATURE: 98 F | SYSTOLIC BLOOD PRESSURE: 110 MMHG | HEART RATE: 60 BPM | BODY MASS INDEX: 24.79 KG/M2

## 2024-09-09 DIAGNOSIS — Z00.00 ROUTINE GENERAL MEDICAL EXAMINATION AT A HEALTH CARE FACILITY: Primary | ICD-10-CM

## 2024-09-09 DIAGNOSIS — R93.1 AGATSTON CORONARY ARTERY CALCIUM SCORE GREATER THAN 400: ICD-10-CM

## 2024-09-09 DIAGNOSIS — J98.01 BRONCHOSPASM: ICD-10-CM

## 2024-09-09 DIAGNOSIS — Z00.00 ROUTINE GENERAL MEDICAL EXAMINATION AT A HEALTH CARE FACILITY: ICD-10-CM

## 2024-09-09 DIAGNOSIS — Z23 NEED FOR INFLUENZA VACCINATION: ICD-10-CM

## 2024-09-09 DIAGNOSIS — I20.9 AP (ANGINA PECTORIS): ICD-10-CM

## 2024-09-09 DIAGNOSIS — I65.23 ASYMPTOMATIC STENOSIS OF BOTH CAROTID ARTERIES WITHOUT INFARCTION: ICD-10-CM

## 2024-09-09 DIAGNOSIS — I25.118 CORONARY ARTERY DISEASE OF NATIVE ARTERY OF NATIVE HEART WITH STABLE ANGINA PECTORIS: ICD-10-CM

## 2024-09-09 LAB
ALBUMIN SERPL BCP-MCNC: 4.1 G/DL (ref 3.5–5.2)
ALP SERPL-CCNC: 83 U/L (ref 55–135)
ALT SERPL W/O P-5'-P-CCNC: 30 U/L (ref 10–44)
ANION GAP SERPL CALC-SCNC: 9 MMOL/L (ref 8–16)
AST SERPL-CCNC: 23 U/L (ref 10–40)
BILIRUB SERPL-MCNC: 0.9 MG/DL (ref 0.1–1)
BUN SERPL-MCNC: 16 MG/DL (ref 8–23)
CALCIUM SERPL-MCNC: 9.4 MG/DL (ref 8.7–10.5)
CHLORIDE SERPL-SCNC: 106 MMOL/L (ref 95–110)
CHOLEST SERPL-MCNC: 101 MG/DL (ref 120–199)
CHOLEST/HDLC SERPL: 3.1 {RATIO} (ref 2–5)
CO2 SERPL-SCNC: 26 MMOL/L (ref 23–29)
COMPLEXED PSA SERPL-MCNC: 1.1 NG/ML (ref 0–4)
CREAT SERPL-MCNC: 0.9 MG/DL (ref 0.5–1.4)
ERYTHROCYTE [DISTWIDTH] IN BLOOD BY AUTOMATED COUNT: 11.9 % (ref 11.5–14.5)
EST. GFR  (NO RACE VARIABLE): >60 ML/MIN/1.73 M^2
ESTIMATED AVG GLUCOSE: 100 MG/DL (ref 68–131)
GLUCOSE SERPL-MCNC: 95 MG/DL (ref 70–110)
HBA1C MFR BLD: 5.1 % (ref 4–5.6)
HCT VFR BLD AUTO: 41.5 % (ref 40–54)
HDLC SERPL-MCNC: 33 MG/DL (ref 40–75)
HDLC SERPL: 32.7 % (ref 20–50)
HGB BLD-MCNC: 14.1 G/DL (ref 14–18)
LDLC SERPL CALC-MCNC: 53.4 MG/DL (ref 63–159)
MCH RBC QN AUTO: 32.9 PG (ref 27–31)
MCHC RBC AUTO-ENTMCNC: 34 G/DL (ref 32–36)
MCV RBC AUTO: 97 FL (ref 82–98)
NONHDLC SERPL-MCNC: 68 MG/DL
OHS QRS DURATION: 78 MS
OHS QTC CALCULATION: 429 MS
PLATELET # BLD AUTO: 234 K/UL (ref 150–450)
PMV BLD AUTO: 9.8 FL (ref 9.2–12.9)
POTASSIUM SERPL-SCNC: 4.2 MMOL/L (ref 3.5–5.1)
PROT SERPL-MCNC: 7.3 G/DL (ref 6–8.4)
RBC # BLD AUTO: 4.29 M/UL (ref 4.6–6.2)
SODIUM SERPL-SCNC: 141 MMOL/L (ref 136–145)
TRIGL SERPL-MCNC: 73 MG/DL (ref 30–150)
TSH SERPL DL<=0.005 MIU/L-ACNC: 1.24 UIU/ML (ref 0.4–4)
WBC # BLD AUTO: 6.16 K/UL (ref 3.9–12.7)

## 2024-09-09 PROCEDURE — 80061 LIPID PANEL: CPT | Performed by: INTERNAL MEDICINE

## 2024-09-09 PROCEDURE — 99396 PREV VISIT EST AGE 40-64: CPT | Mod: 25,S$GLB,, | Performed by: INTERNAL MEDICINE

## 2024-09-09 PROCEDURE — 3008F BODY MASS INDEX DOCD: CPT | Mod: CPTII,S$GLB,, | Performed by: INTERNAL MEDICINE

## 2024-09-09 PROCEDURE — 84443 ASSAY THYROID STIM HORMONE: CPT | Performed by: INTERNAL MEDICINE

## 2024-09-09 PROCEDURE — 3074F SYST BP LT 130 MM HG: CPT | Mod: CPTII,S$GLB,, | Performed by: INTERNAL MEDICINE

## 2024-09-09 PROCEDURE — 84153 ASSAY OF PSA TOTAL: CPT | Performed by: INTERNAL MEDICINE

## 2024-09-09 PROCEDURE — 99999 PR PBB SHADOW E&M-EST. PATIENT-LVL III: CPT | Mod: PBBFAC,,, | Performed by: INTERNAL MEDICINE

## 2024-09-09 PROCEDURE — 83036 HEMOGLOBIN GLYCOSYLATED A1C: CPT | Performed by: INTERNAL MEDICINE

## 2024-09-09 PROCEDURE — 3044F HG A1C LEVEL LT 7.0%: CPT | Mod: CPTII,S$GLB,, | Performed by: INTERNAL MEDICINE

## 2024-09-09 PROCEDURE — 3078F DIAST BP <80 MM HG: CPT | Mod: CPTII,S$GLB,, | Performed by: INTERNAL MEDICINE

## 2024-09-09 PROCEDURE — 93010 ELECTROCARDIOGRAM REPORT: CPT | Mod: ,,, | Performed by: INTERNAL MEDICINE

## 2024-09-09 PROCEDURE — 90656 IIV3 VACC NO PRSV 0.5 ML IM: CPT | Mod: S$GLB,,, | Performed by: INTERNAL MEDICINE

## 2024-09-09 PROCEDURE — 85027 COMPLETE CBC AUTOMATED: CPT | Performed by: INTERNAL MEDICINE

## 2024-09-09 PROCEDURE — 80053 COMPREHEN METABOLIC PANEL: CPT | Performed by: INTERNAL MEDICINE

## 2024-09-09 PROCEDURE — 93005 ELECTROCARDIOGRAM TRACING: CPT

## 2024-09-09 PROCEDURE — 90471 IMMUNIZATION ADMIN: CPT | Mod: S$GLB,,, | Performed by: INTERNAL MEDICINE

## 2024-09-09 PROCEDURE — 1159F MED LIST DOCD IN RCRD: CPT | Mod: CPTII,S$GLB,, | Performed by: INTERNAL MEDICINE

## 2024-09-09 RX ORDER — LEVALBUTEROL TARTRATE 45 UG/1
2 AEROSOL, METERED ORAL EVERY 4 HOURS PRN
Qty: 45 G | Refills: 1 | Status: SHIPPED | OUTPATIENT
Start: 2024-09-09

## 2024-09-09 NOTE — PROGRESS NOTES
Subjective:      Patient ID: Hernan Fields is a 64 y.o. male.    Chief Complaint: Executive Health    HPI      It was a pleasure to see you again for your Executive  Health Physical on 24 .  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  You are a former smoker who quit in the  .  You occasionally drink alcohol.  Your  neurologist is Dr. Mina.  Your cardiologist is Dr. Agustin.     Your current medications include albuterol, aspirin,  Lipitor,  Clonazepam which you take once weekly for public speeking, metoprolol, amlodipine,   Ranexa, Valtrex, and Viagra.     Your have an allergy to iodine and iodine products.     Your past surgical history includes right orbital  fracture surgery, hernia repair, tonsillectomy,  cholecystectomy in May 2013, left heart  catheterization, and prostate biopsy.     Your family history includes a father   with COPD and hypertension.  Your mother is  at  age 53 with a brain aneurysm.  Siblings - heart flutters   children are healthy.      HEALTH MAINTENANCE:  Your last colonoscopy was done on  2013 and you are due for repeat in .  Reported up to date with shingles vaccine. Flu vaccine 24. Your tetanus vaccine was given in . Check with your pharmacy regarding RSV vaccine and covid vaccine.      Review of Systems   Constitutional:  Negative for chills and fever.   HENT:  Negative for ear pain and sore throat.    Respiratory:  Negative for cough.    Cardiovascular:  Negative for chest pain.   Gastrointestinal:  Negative for abdominal pain and blood in stool.   Genitourinary:  Negative for dysuria and  hematuria.   Neurological:  Negative for seizures and syncope.     Objective:   /73   Pulse 60   Temp 97.6 °F (36.4 °C)   Ht 6' (1.829 m)   Wt 83 kg (183 lb)   BMI 24.82 kg/m²     Physical Exam  Constitutional:       General: He is not in acute distress.     Appearance: He is well-developed.   HENT:      Head: Normocephalic and atraumatic.   Eyes:      Extraocular Movements: Extraocular movements intact.   Neck:      Thyroid: No thyromegaly.   Cardiovascular:      Rate and Rhythm: Normal rate and regular rhythm.   Pulmonary:      Breath sounds: Normal breath sounds. No wheezing or rales.   Abdominal:      General: Bowel sounds are normal.      Palpations: Abdomen is soft.      Tenderness: There is no abdominal tenderness.   Musculoskeletal:         General: No swelling.      Cervical back: Neck supple. No rigidity.   Lymphadenopathy:      Cervical: No cervical adenopathy.   Skin:     General: Skin is warm and dry.   Neurological:      Mental Status: He is alert and oriented to person, place, and time.   Psychiatric:         Behavior: Behavior normal.         Lab Results   Component Value Date    WBC 6.16 09/09/2024    HGB 14.1 09/09/2024    HGB 15.1 05/18/2023    HGB 15.4 01/09/2023    HCT 41.5 09/09/2024    MCV 97 09/09/2024    MCV 95 05/18/2023    MCV 97 01/09/2023     09/09/2024    CHOL 101 (L) 09/09/2024    TRIG 73 09/09/2024    HDL 33 (L) 09/09/2024    LDLCALC 53.4 (L) 09/09/2024    LDLCALC 58.2 (L) 08/09/2024    LDLCALC 44.6 (L) 02/09/2024    ALT 30 09/09/2024    AST 23 09/09/2024     09/09/2024    K 4.2 09/09/2024    CALCIUM 9.4 09/09/2024     09/09/2024    CO2 26 09/09/2024    BUN 16 09/09/2024    CREATININE 0.9 09/09/2024    CREATININE 0.9 08/09/2024    CREATININE 1.1 02/09/2024    EGFRNORACEVR >60 09/09/2024    EGFRNORACEVR >60.0 08/09/2024    EGFRNORACEVR >60.0 02/09/2024    TSH 1.242 09/09/2024    TSH 1.646 05/18/2023    TSH 1.603 09/20/2022    PSA 1.1 09/09/2024    PSA 0.98  05/18/2023    PSA 1.1 09/20/2022    PSADIAG 1.0 01/09/2023    PSADIAG 1.2 06/09/2022    PSADIAG 1.0 01/20/2022    GLU 95 09/09/2024    HGBA1C 5.1 09/09/2024    HGBA1C 4.9 05/18/2023    HGBA1C 5.0 09/20/2022    [id number] 44 09/30/2021    [id number] 38 08/19/2020    TOTALTESTOST 232 (L) 01/09/2023    TOTALTESTOST 342 06/09/2022    TOTALTESTOST 245 (L) 01/20/2022          The ASCVD Risk score (Joe DK, et al., 2019) failed to calculate for the following reasons:    The valid total cholesterol range is 130 to 320 mg/dL     Assessment:     1. Routine general medical examination at a health care facility    2. Need for influenza vaccination    3. Agatston coronary artery calcium score greater than 400    4. AP (angina pectoris)    5. Asymptomatic stenosis of both carotid arteries without infarction    6. Coronary artery disease of native artery of native heart with stable angina pectoris    7. Bronchospasm      Plan:   1. Routine general medical examination at a health care facility  Heart healthy diet, regular exercise, and regular use of sunscreen.   HM reviewed  See exec health letter for details.     2. Need for influenza vaccination  -     influenza (Flulaval, Fluzone, Fluarix) 45 mcg/0.5 mL IM vaccine (> or = 6 mo) 0.5 mL    3. Agatston coronary artery calcium score greater than 400    4. AP (angina pectoris)    5. Asymptomatic stenosis of both carotid arteries without infarction    6. Coronary artery disease of native artery of native heart with stable angina pectoris    7. Bronchospasm  Comments:  only with exercise  alb helps  Orders:  -     levalbuterol (XOPENEX HFA) 45 mcg/actuation inhaler; Inhale 2 puffs into the lungs every 4 (four) hours as needed for Wheezing or Shortness of Breath.  Dispense: 45 g; Refill: 1        There are no Patient Instructions on file for this visit.    Future Appointments   Date Time Provider Department Center   2/11/2025  7:50 AM LABORATORY, [place] [facility]  [place]   2/11/2025 12:15 PM CARDIOVASCULAR, [facility] [place]   2/24/2025 10:40 AM Umer Agustin MD [facility] CARDIO [place]       Lab Frequency Next Occurrence   CV Ultrasound Bilateral Doppler Carotid Once 02/26/2025   Comprehensive Metabolic Panel Once 02/26/2025   Lipid Panel Once 02/26/2025   CRP, High Sensitivity Once 02/26/2025       Follow up if symptoms worsen or fail to improve.

## 2024-11-05 NOTE — TELEPHONE ENCOUNTER
Returned call to Vinayak with Dr. Weiler's office [phone]  Fax [phone]  Requesting cardiac clearance and okay to hold Aspirin   Patient scheduled for Eyelid surgery (Blepharoplasty with levator adjustment) under local with oral sedation    Dr. Agustin- please advise  Last visit May 1, 2018 with EKG  Last Cath June 1, 2016   Subjective:       Patient ID: Bette Campos is a 43 y.o. female.    Chief Complaint: Cough (Green discharge ) and Sore Throat    Bette is here for follow-up.   Here today for subacute sinusitis.     E-visit with Dr. Dennis for URTI symptoms on 09/03/2024; given antiviral and cough syrup. Then started feeling worse on 10/03/2024. Went to [facility] on 10/04/2024, told viral. Saw Dr. Morgan on 10/09/2024 for URTI symptoms; given Amoxicillin 875 mg BID X 10 days (currently on). Green mucus in nose and coughing up green mucus started 3 days ago. Then seen by Maylin and given Doxycycline 3 weeks ago x 10 d and medrol.     She has been experiecning persistent thick buildup in her nose, cough, congestion, facial pressure.   Seems as though symptoms improved a bit and then worsened again between visit with Maylin and myself    Typically 1-2 sinus infections per yr.   Takes Zyrtec.    Patient validated questionnaires (if applicable):  SNOT-22 score: : (Patient-Rptd) (P) 27  NOSE score:: (Patient-Rptd) (P) 45%  ETDQ-7 score:: (Patient-Rptd) (P) 1         No data to display                   No data to display                   No data to display                     Review of Systems   Constitutional: Negative for activity change and appetite change.   Respiratory: Negative for difficulty breathing and wheezing   Cardiovascular: Negative for chest pain.      Objective:        Constitutional:   She is oriented to person, place, and time. She appears well-developed and well-nourished. She appears alert. She is active. Normal speech.      Head:  Normocephalic and atraumatic. Head is without TMJ tenderness. No scars. Salivary glands normal.  Facial strength is normal.      Ears:    Right Ear: No drainage or swelling. No middle ear effusion.   Left Ear: No drainage or swelling.  No middle ear effusion.     Nose:  Septal deviation present. No mucosal edema, rhinorrhea or sinus tenderness. No turbinate hypertrophy.    Mild nasal edema  R  Nasal endoscopy indicated    Mouth/Throat  Oropharynx clear and moist without lesions or asymmetry, normal uvula midline and mirror exam normal. Normal dentition. No uvula swelling, lacerations or trismus. No oropharyngeal exudate. Tonsillar erythema, tonsillar exudate.      Neck:  Full range of motion with neck supple and no adenopathy. Thyroid tenderness is present. No tracheal deviation, no edema, no erythema, normal range of motion, no stridor, no crepitus and no neck rigidity present. No thyroid mass present.     Cardiovascular:    Intact distal pulses and normal pulses.              Pulmonary/Chest:   Effort normal and breath sounds normal. No stridor.     Psychiatric:   Her speech is normal and behavior is normal. Her mood appears not anxious. Her affect is not labile.     Neurological:   She is alert and oriented to person, place, and time. No sensory deficit.     Skin:   No abrasions, lacerations, lesions, or rashes. No abrasion and no bruising noted.         Tests / Results:  Name: Bette Campos     Pre-procedure diagnosis: Subacute sinusitis, unspecified location [J01.90]  Post-procedure diagnosis: Same    Procedure: Bilateral nasal endoscopy  Anesthesia:  4% Lidocaine and 0.25% Phenylephrine Topical    Indication: This procedure is indicated as anterior rhinoscopy is not sufficient to account for all of the patients symptoms.     Procedure: Risks, benefits, and alternatives of the procedure were discussed with the patient, and consent was obtained to perform a nasal endoscopy.  The nasal cavity was sprayed with a topical decongestant and topical anesthetic. After adequate anesthesia was obtained, the scope was passed into each nostril independently.  The nasal cavities (including inferior turbinates, middle turbinates, inferior meatus, middle meatus, superior meatus) nasopharynx, choana, eustachian tube, fossa of Rosenmüller, and adenoids were examined. All findings were normal with exception of  description below. At the end of the examination, the scope was removed. The patient tolerated the procedure well with no complications.     LEFT: minimal nasal edema. Scant clear rhinorrhea  RIGHT: minimal nasal edema. Scant clear rhinorrhea    Assessment:       1. Subacute sinusitis, unspecified location          Plan:         No infection seen today. Right now, appears that infection is improving though symptoms taking a bit to resolve.  Sent me a delayed message that cough / productive cough / phlegm has worsened - Seems right now like another viral illness. Supportive treatment. Cough medication send by another provider and I obtained a CXR which was negative. If sinus symptoms persist, we can consider some sinus imaging. If laryngeal symptoms persist, may treat for bacterial laryngitis / tracheitis but could be distinct though coincidental from recent illness.

## 2024-11-18 ENCOUNTER — OFFICE VISIT (OUTPATIENT)
Dept: DERMATOLOGY | Facility: CLINIC | Age: 64
End: 2024-11-18
Payer: COMMERCIAL

## 2024-11-18 DIAGNOSIS — B07.9 VIRAL WARTS, UNSPECIFIED TYPE: Primary | ICD-10-CM

## 2024-11-18 PROCEDURE — 1160F RVW MEDS BY RX/DR IN RCRD: CPT | Mod: CPTII,S$GLB,, | Performed by: PHYSICIAN ASSISTANT

## 2024-11-18 PROCEDURE — 99999 PR PBB SHADOW E&M-EST. PATIENT-LVL III: CPT | Mod: PBBFAC,,, | Performed by: PHYSICIAN ASSISTANT

## 2024-11-18 PROCEDURE — 17110 DESTRUCTION B9 LES UP TO 14: CPT | Mod: S$GLB,,, | Performed by: PHYSICIAN ASSISTANT

## 2024-11-18 PROCEDURE — 99213 OFFICE O/P EST LOW 20 MIN: CPT | Mod: 25,S$GLB,, | Performed by: PHYSICIAN ASSISTANT

## 2024-11-18 PROCEDURE — 1159F MED LIST DOCD IN RCRD: CPT | Mod: CPTII,S$GLB,, | Performed by: PHYSICIAN ASSISTANT

## 2024-11-18 PROCEDURE — 3044F HG A1C LEVEL LT 7.0%: CPT | Mod: CPTII,S$GLB,, | Performed by: PHYSICIAN ASSISTANT

## 2024-11-18 NOTE — PROGRESS NOTES
Subjective:      Patient ID:  Hernan Fields is a 64 y.o. male who presents for   Chief Complaint   Patient presents with    Warts     C/o warts on left hand      Hx of subcutaneous nodule of neck, last seen by Dr. Roman 10/6/2021. Here for new c/o wart of left hand. Duration 6 months.  +raised, wart like bumps.     Previous tx: otc compound w, otc cryo.        Review of Systems   Constitutional:  Negative for fever and chills.   Gastrointestinal:  Negative for nausea and vomiting.   Skin:  Positive for activity-related sunscreen use. Negative for itching, rash, dry skin, sun sensitivity, daily sunscreen use, recent sunburn, dry lips and abscesses.   Hematologic/Lymphatic: Does not bruise/bleed easily.       Objective:   Physical Exam   Constitutional: He appears well-developed and well-nourished. No distress.   Neurological: He is alert and oriented to person, place, and time. He is not disoriented.   Psychiatric: He has a normal mood and affect.   Skin:   Areas Examined (abnormalities noted in diagram):   Head / Face Inspection Performed  Neck Inspection Performed  Chest / Axilla Inspection Performed  Back Inspection Performed  RUE Inspected  LUE Inspection Performed  Nails and Digits Inspection Performed           Diagram Legend     Erythematous scaling macule/papule c/w actinic keratosis       Vascular papule c/w angioma      Pigmented verrucoid papule/plaque c/w seborrheic keratosis      Yellow umbilicated papule c/w sebaceous hyperplasia      Irregularly shaped tan macule c/w lentigo     1-2 mm smooth white papules consistent with Milia      Movable subcutaneous cyst with punctum c/w epidermal inclusion cyst      Subcutaneous movable cyst c/w pilar cyst      Firm pink to brown papule c/w dermatofibroma      Pedunculated fleshy papule(s) c/w skin tag(s)      Evenly pigmented macule c/w junctional nevus     Mildly variegated pigmented, slightly irregular-bordered macule c/w mildly atypical nevus      Flesh  colored to evenly pigmented papule c/w intradermal nevus       Pink pearly papule/plaque c/w basal cell carcinoma      Erythematous hyperkeratotic cursted plaque c/w SCC      Surgical scar with no sign of skin cancer recurrence      Open and closed comedones      Inflammatory papules and pustules      Verrucoid papule consistent consistent with wart     Erythematous eczematous patches and plaques     Dystrophic onycholytic nail with subungual debris c/w onychomycosis     Umbilicated papule    Erythematous-base heme-crusted tan verrucoid plaque consistent with inflamed seborrheic keratosis     Erythematous Silvery Scaling Plaque c/w Psoriasis     See annotation      Assessment / Plan:        Viral warts, unspecified type  Discussed dx, tx, and difficulty of tx, likelihood for repeat treatment necessary. May resume otc compound w prn residual wart once healed from cryo.   Cryosurgery procedure note:    After risks, benefits, and alternatives discussed, including blistering, pain, scar, recurrence, allergy to anesthesia (if given), hyper- and hypopigmentation, verbal consent from the patient is obtained.  Liquid nitrogen cryosurgery is applied to < 7 verruca with prior paring, as detailed in the physical exam, to produce a freeze injury. 2 consecutive freeze thaw cycles are applied to each verruca. The patient is aware that blisters (possibly blood blisters) may form.             Follow up if symptoms worsen or fail to improve.

## 2024-12-30 ENCOUNTER — OFFICE VISIT (OUTPATIENT)
Dept: INTERNAL MEDICINE | Facility: CLINIC | Age: 64
End: 2024-12-30
Payer: COMMERCIAL

## 2024-12-30 VITALS
WEIGHT: 188.5 LBS | OXYGEN SATURATION: 98 % | HEIGHT: 72 IN | BODY MASS INDEX: 25.53 KG/M2 | HEART RATE: 78 BPM | DIASTOLIC BLOOD PRESSURE: 74 MMHG | TEMPERATURE: 99 F | SYSTOLIC BLOOD PRESSURE: 124 MMHG

## 2024-12-30 DIAGNOSIS — J11.1 INFLUENZA-LIKE ILLNESS: Primary | ICD-10-CM

## 2024-12-30 DIAGNOSIS — R05.9 COUGH, UNSPECIFIED TYPE: ICD-10-CM

## 2024-12-30 LAB
CTP QC/QA: YES
CTP QC/QA: YES
POC MOLECULAR INFLUENZA A AGN: NEGATIVE
POC MOLECULAR INFLUENZA B AGN: NEGATIVE
SARS-COV-2 RDRP RESP QL NAA+PROBE: NEGATIVE

## 2024-12-30 PROCEDURE — 3074F SYST BP LT 130 MM HG: CPT | Mod: CPTII,S$GLB,, | Performed by: PHYSICIAN ASSISTANT

## 2024-12-30 PROCEDURE — 3078F DIAST BP <80 MM HG: CPT | Mod: CPTII,S$GLB,, | Performed by: PHYSICIAN ASSISTANT

## 2024-12-30 PROCEDURE — 99214 OFFICE O/P EST MOD 30 MIN: CPT | Mod: S$GLB,,, | Performed by: PHYSICIAN ASSISTANT

## 2024-12-30 PROCEDURE — 1159F MED LIST DOCD IN RCRD: CPT | Mod: CPTII,S$GLB,, | Performed by: PHYSICIAN ASSISTANT

## 2024-12-30 PROCEDURE — 3044F HG A1C LEVEL LT 7.0%: CPT | Mod: CPTII,S$GLB,, | Performed by: PHYSICIAN ASSISTANT

## 2024-12-30 PROCEDURE — 1160F RVW MEDS BY RX/DR IN RCRD: CPT | Mod: CPTII,S$GLB,, | Performed by: PHYSICIAN ASSISTANT

## 2024-12-30 PROCEDURE — 87502 INFLUENZA DNA AMP PROBE: CPT | Mod: QW,S$GLB,, | Performed by: PHYSICIAN ASSISTANT

## 2024-12-30 PROCEDURE — 99999 PR PBB SHADOW E&M-EST. PATIENT-LVL IV: CPT | Mod: PBBFAC,,, | Performed by: PHYSICIAN ASSISTANT

## 2024-12-30 PROCEDURE — 3008F BODY MASS INDEX DOCD: CPT | Mod: CPTII,S$GLB,, | Performed by: PHYSICIAN ASSISTANT

## 2024-12-30 PROCEDURE — 87635 SARS-COV-2 COVID-19 AMP PRB: CPT | Mod: QW,S$GLB,, | Performed by: PHYSICIAN ASSISTANT

## 2024-12-30 RX ORDER — PROMETHAZINE HYDROCHLORIDE AND DEXTROMETHORPHAN HYDROBROMIDE 6.25; 15 MG/5ML; MG/5ML
5 SYRUP ORAL NIGHTLY PRN
Qty: 120 ML | Refills: 0 | Status: SHIPPED | OUTPATIENT
Start: 2024-12-30

## 2024-12-30 RX ORDER — BENZONATATE 200 MG/1
200 CAPSULE ORAL 3 TIMES DAILY PRN
Qty: 30 CAPSULE | Refills: 0 | Status: SHIPPED | OUTPATIENT
Start: 2024-12-30 | End: 2025-01-09

## 2024-12-30 NOTE — PROGRESS NOTES
Subjective:      Patient ID: Hernan Fields is a 64 y.o. male.    Chief Complaint: URI (Started Saturday evening, / fever)    Patient is known to me, being seen today for URI/fever.   Treatment includes Nyquil, Tylenol (last in AM)   No known sick contacts     Last visit Sept 2024 w PCP.       Review of Systems   Constitutional:  Positive for fever (100). Negative for chills and diaphoresis.   HENT:  Positive for congestion, postnasal drip and sore throat. Negative for rhinorrhea.    Respiratory:  Positive for cough (dry, occasionally productive). Negative for shortness of breath and wheezing.    Gastrointestinal:  Positive for nausea. Negative for abdominal pain, constipation, diarrhea and vomiting.   Musculoskeletal:  Positive for myalgias (body aches).   Skin:  Negative for rash.   Neurological:  Negative for dizziness, light-headedness and headaches.       Objective:   /74 (Patient Position: Sitting)   Pulse 78   Temp 98.8 °F (37.1 °C) (Tympanic)   Ht 6' (1.829 m)   Wt 85.5 kg (188 lb 7.9 oz)   SpO2 98%   BMI 25.56 kg/m²   Physical Exam  Constitutional:       General: He is not in acute distress.     Appearance: He is well-developed. He is not ill-appearing or diaphoretic.   HENT:      Head: Normocephalic and atraumatic.      Right Ear: Tympanic membrane and ear canal normal. No middle ear effusion. Tympanic membrane is not erythematous.      Left Ear: Tympanic membrane and ear canal normal.  No middle ear effusion. Tympanic membrane is not erythematous.      Nose: Mucosal edema present.      Mouth/Throat:      Pharynx: Uvula midline. No posterior oropharyngeal erythema.      Comments: Signs of PND  Cardiovascular:      Rate and Rhythm: Normal rate and regular rhythm.      Heart sounds: Normal heart sounds. No murmur heard.  Pulmonary:      Effort: Pulmonary effort is normal. No respiratory distress.      Breath sounds: Normal breath sounds. No decreased breath sounds, wheezing, rhonchi or rales.    Lymphadenopathy:      Cervical: No cervical adenopathy.   Skin:     General: Skin is warm and dry.      Findings: No rash.   Psychiatric:         Speech: Speech normal.         Behavior: Behavior normal.         Thought Content: Thought content normal.       Assessment:      1. Influenza-like illness    2. Cough, unspecified type       Plan:   Influenza-like illness  -     POCT COVID-19 Rapid Screening  -     POCT Influenza A/B Molecular    Cough, unspecified type  -     promethazine-dextromethorphan (PROMETHAZINE-DM) 6.25-15 mg/5 mL Syrp; Take 5 mLs by mouth nightly as needed (Cough).  Dispense: 120 mL; Refill: 0  -     benzonatate (TESSALON) 200 MG capsule; Take 1 capsule (200 mg total) by mouth 3 (three) times daily as needed for Cough.  Dispense: 30 capsule; Refill: 0      Advised patient based on time frame and symptomology this is likely a viral upper respiratory infection.  It does not require antibiotics at this time.    Will treat symptoms with OTC meds.      Discussed worsening signs/symptoms and when to return to clinic or go to ED.   Patient expresses understanding and agrees with treatment plan.

## 2025-01-26 ENCOUNTER — PATIENT MESSAGE (OUTPATIENT)
Dept: CARDIOLOGY | Facility: HOSPITAL | Age: 65
End: 2025-01-26
Payer: COMMERCIAL

## 2025-02-11 DIAGNOSIS — F41.9 ANXIETY: ICD-10-CM

## 2025-02-11 RX ORDER — CLONAZEPAM 0.5 MG/1
0.5 TABLET ORAL DAILY PRN
Qty: 15 TABLET | Refills: 0 | Status: SHIPPED | OUTPATIENT
Start: 2025-02-11

## 2025-02-11 NOTE — TELEPHONE ENCOUNTER
No care due was identified.  [facility] Embedded Care Due Messages. Reference number: [id number].   2/11/2025 6:12:53 AM CST

## 2025-02-19 ENCOUNTER — HOSPITAL ENCOUNTER (OUTPATIENT)
Dept: CARDIOLOGY | Facility: HOSPITAL | Age: 65
Discharge: HOME | End: 2025-02-19
Attending: INTERNAL MEDICINE
Payer: COMMERCIAL

## 2025-02-19 VITALS
WEIGHT: 188 LBS | BODY MASS INDEX: 25.47 KG/M2 | DIASTOLIC BLOOD PRESSURE: 75 MMHG | HEIGHT: 72 IN | SYSTOLIC BLOOD PRESSURE: 130 MMHG

## 2025-02-19 DIAGNOSIS — R93.1 AGATSTON CORONARY ARTERY CALCIUM SCORE GREATER THAN 400: ICD-10-CM

## 2025-02-19 DIAGNOSIS — I10 PRIMARY HYPERTENSION: Chronic | ICD-10-CM

## 2025-02-19 DIAGNOSIS — E78.2 MIXED HYPERLIPIDEMIA: ICD-10-CM

## 2025-02-19 DIAGNOSIS — R07.89 ATYPICAL CHEST PAIN: ICD-10-CM

## 2025-02-19 DIAGNOSIS — I20.9 AP (ANGINA PECTORIS): ICD-10-CM

## 2025-02-19 DIAGNOSIS — Z98.61 HISTORY OF PTCA: ICD-10-CM

## 2025-02-19 DIAGNOSIS — I25.118 CORONARY ARTERY DISEASE OF NATIVE ARTERY OF NATIVE HEART WITH STABLE ANGINA PECTORIS: ICD-10-CM

## 2025-02-19 DIAGNOSIS — R00.2 PALPITATIONS: ICD-10-CM

## 2025-02-19 DIAGNOSIS — R94.31 ABNORMAL ECG: ICD-10-CM

## 2025-02-19 DIAGNOSIS — I65.23 ASYMPTOMATIC STENOSIS OF BOTH CAROTID ARTERIES WITHOUT INFARCTION: ICD-10-CM

## 2025-02-19 LAB
LEFT ARM DIASTOLIC BLOOD PRESSURE: 60 MMHG
LEFT ARM SYSTOLIC BLOOD PRESSURE: 127 MMHG
LEFT CBA DIAS: 20 CM/S
LEFT CBA SYS: 82 CM/S
LEFT CCA DIST DIAS: 22 CM/S
LEFT CCA DIST SYS: 76 CM/S
LEFT CCA MID DIAS: 24 CM/S
LEFT CCA MID SYS: 78 CM/S
LEFT CCA PROX DIAS: 21 CM/S
LEFT CCA PROX SYS: 88 CM/S
LEFT ECA DIAS: 7 CM/S
LEFT ECA SYS: 64 CM/S
LEFT ICA DIST DIAS: 26 CM/S
LEFT ICA DIST SYS: 76 CM/S
LEFT ICA MID DIAS: 25 CM/S
LEFT ICA MID SYS: 84 CM/S
LEFT ICA PROX DIAS: 23 CM/S
LEFT ICA PROX SYS: 76 CM/S
LEFT VERTEBRAL DIAS: 14 CM/S
LEFT VERTEBRAL SYS: 52 CM/S
OHS CV CAROTID RIGHT ICA EDV HIGHEST: 76
OHS CV CAROTID ULTRASOUND LEFT ICA/CCA RATIO: 1.11
OHS CV CAROTID ULTRASOUND RIGHT ICA/CCA RATIO: 3.14
OHS CV PV CAROTID LEFT HIGHEST CCA: 88
OHS CV PV CAROTID LEFT HIGHEST ICA: 84
OHS CV PV CAROTID RIGHT HIGHEST CCA: 80
OHS CV PV CAROTID RIGHT HIGHEST ICA: 229
OHS CV US CAROTID LEFT HIGHEST EDV: 26
RIGHT ARM DIASTOLIC BLOOD PRESSURE: 75 MMHG
RIGHT ARM SYSTOLIC BLOOD PRESSURE: 130 MMHG
RIGHT CBA DIAS: 33 CM/S
RIGHT CBA SYS: 127 CM/S
RIGHT CCA DIST DIAS: 18 CM/S
RIGHT CCA DIST SYS: 73 CM/S
RIGHT CCA MID DIAS: 18 CM/S
RIGHT CCA MID SYS: 80 CM/S
RIGHT CCA PROX DIAS: 10 CM/S
RIGHT CCA PROX SYS: 67 CM/S
RIGHT ECA DIAS: 12 CM/S
RIGHT ECA SYS: 80 CM/S
RIGHT ICA DIST DIAS: 20 CM/S
RIGHT ICA DIST SYS: 56 CM/S
RIGHT ICA MID DIAS: 34 CM/S
RIGHT ICA MID SYS: 121 CM/S
RIGHT ICA PROX DIAS: 76 CM/S
RIGHT ICA PROX SYS: 229 CM/S
RIGHT VERTEBRAL DIAS: 11 CM/S
RIGHT VERTEBRAL SYS: 48 CM/S

## 2025-02-19 PROCEDURE — 93880 EXTRACRANIAL BILAT STUDY: CPT | Mod: 26,,, | Performed by: INTERNAL MEDICINE

## 2025-02-19 PROCEDURE — 93880 EXTRACRANIAL BILAT STUDY: CPT | Mod: PO

## 2025-02-23 PROBLEM — R74.8 ABNORMAL LIVER ENZYMES: Status: ACTIVE | Noted: 2025-02-23

## 2025-02-24 ENCOUNTER — RESULTS FOLLOW-UP (OUTPATIENT)
Dept: CARDIOLOGY | Facility: CLINIC | Age: 65
End: 2025-02-24

## 2025-02-24 ENCOUNTER — OFFICE VISIT (OUTPATIENT)
Dept: CARDIOLOGY | Facility: CLINIC | Age: 65
End: 2025-02-24
Payer: COMMERCIAL

## 2025-02-24 VITALS
OXYGEN SATURATION: 98 % | WEIGHT: 193.81 LBS | HEART RATE: 65 BPM | SYSTOLIC BLOOD PRESSURE: 130 MMHG | BODY MASS INDEX: 26.28 KG/M2 | DIASTOLIC BLOOD PRESSURE: 78 MMHG

## 2025-02-24 DIAGNOSIS — I10 ESSENTIAL HYPERTENSION: ICD-10-CM

## 2025-02-24 DIAGNOSIS — I20.9 AP (ANGINA PECTORIS): ICD-10-CM

## 2025-02-24 DIAGNOSIS — R00.2 PALPITATIONS: ICD-10-CM

## 2025-02-24 DIAGNOSIS — R74.8 ABNORMAL LIVER ENZYMES: Primary | ICD-10-CM

## 2025-02-24 DIAGNOSIS — R07.89 ATYPICAL CHEST PAIN: ICD-10-CM

## 2025-02-24 DIAGNOSIS — R94.31 ABNORMAL ECG: ICD-10-CM

## 2025-02-24 DIAGNOSIS — I25.118 CORONARY ARTERY DISEASE OF NATIVE ARTERY OF NATIVE HEART WITH STABLE ANGINA PECTORIS: ICD-10-CM

## 2025-02-24 DIAGNOSIS — E78.2 MIXED HYPERLIPIDEMIA: ICD-10-CM

## 2025-02-24 DIAGNOSIS — I65.23 ASYMPTOMATIC STENOSIS OF BOTH CAROTID ARTERIES WITHOUT INFARCTION: ICD-10-CM

## 2025-02-24 DIAGNOSIS — Z98.61 HISTORY OF PTCA: ICD-10-CM

## 2025-02-24 PROCEDURE — 99999 PR PBB SHADOW E&M-EST. PATIENT-LVL III: CPT | Mod: PBBFAC,,, | Performed by: INTERNAL MEDICINE

## 2025-02-24 NOTE — PROGRESS NOTES
Subjective:    Patient ID:  Hernan Fields is a 64 y.o. male who presents for evaluation of Hypertension, Hyperlipidemia, Carotid Artery Disease, and Coronary Artery Disease        HPI: Pt presents for eval.  His current medical conditions include CAD, carotid disease, dyslipidemia, HTN.   Nonsmoker.   + family h/o CAD.   Past hx pertinent for following:  H/o abnormal calcium score 1087 in 2014.   H/o  chronic atypical cp sxs  S/p PCI RCA NADINE x 2 June 2016; calcified nonobstructive LAD/left main disease as well, normal LVEF.  Stress echo 9/18 good exercise capacity, no wall motion abnormality, normal LV function.  ecg 10/19/21 NSR, nonspecific st abnl.  Stress echo 9/21 reviewed: good exercise capacity, exercised 11 minutes, no ecg changes, no ischemia on echo images.  Echo normal LV function.  1 week Vital Holter 5/23: NSR, rare ectopy.  Did note some sxs with some PVCs though.  Echo May 2023: normal LV function.  Stress MPI May 2023: good exercise capacity, no ischemia, normal EF.  Routine carotid u/s March 2024:  50 - 59% RABIA, 20- 39% LICA stenosis.   Med tx and observation advised.  Ecg 8/26/24 personally reviewed: sinus nile 58 bpm, no ischemia.  Now here for 6 month fu appt.  LFTs elevated on recent labs.  No clear reason other than on chronic statin tx.  Had viral infection early Jan 2025 which could have contributed?.  Lipids remain well controlled.  No active angina.  No CHF sxs.  Atypical CP stable and not bothersome.  No bothersome palpitations.  Not as much exercise.  Compliant w meds.  Carotid u/s pending.        Past Medical History:   Diagnosis Date    Allergy     Anxiety     public speaking issues.    Asymptomatic stenosis of both carotid arteries without infarction 08/26/2024    Atypical chest pain 11/05/2018    Coronary artery disease 12/15/2014    dr alexandra    Elevated PSA     Ex-smoker     one year at 18 y/o    False positive serological test for hepatitis C     H/O: Bell's palsy     affects left  side    History of PTCA 09/12/2016    Hypertension     Meningitis     Mixed hyperlipidemia 12/15/2014     Current Outpatient Medications   Medication Instructions    amLODIPine (NORVASC) 5 mg, Oral, Daily    aspirin (ECOTRIN) 81 MG EC tablet Take by mouth. 1 Tablet, Delayed Release (E.C.) Oral Every day    atorvastatin (LIPITOR) 80 mg, Oral, Nightly    ciclopirox (LOPROX) 0.77 % Crea Topical (Top), 2 times daily    clonazePAM (KLONOPIN) 0.5 mg, Oral, Daily PRN    levalbuterol (XOPENEX HFA) 45 mcg/actuation inhaler 2 puffs, Inhalation, Every 4 hours PRN    metoprolol succinate (TOPROL-XL) 50 mg, Oral    nitroGLYCERIN (NITROSTAT) 0.4 mg, Sublingual, Every 5 min PRN    promethazine-dextromethorphan (PROMETHAZINE-DM) 6.25-15 mg/5 mL Syrp 5 mLs, Oral, Nightly PRN    ranolazine (RANEXA) 500 MG Tb12 TAKE 1 TABLET BY MOUTH TWICE A DAY    sildenafiL (VIAGRA) 100 MG tablet 1/2 to 1 tab po qd prn erectile dysfunction.    valacyclovir (VALTREX) 500 MG tablet 1 tablet, Daily         Review of Systems   Constitutional: Negative.   HENT: Negative.     Eyes: Negative.    Cardiovascular: Negative.    Respiratory: Negative.     Endocrine: Negative.    Hematologic/Lymphatic: Negative.    Skin: Negative.    Musculoskeletal: Negative.    Gastrointestinal: Negative.    Genitourinary: Negative.    Neurological: Negative.    Psychiatric/Behavioral: Negative.     Allergic/Immunologic: Negative.           /78 (BP Location: Left arm, Patient Position: Sitting)   Pulse 65   Wt 87.9 kg (193 lb 12.6 oz)   SpO2 98%   BMI 26.28 kg/m²     Wt Readings from Last 3 Encounters:   02/24/25 87.9 kg (193 lb 12.6 oz)   02/19/25 85.3 kg (188 lb)   12/30/24 85.5 kg (188 lb 7.9 oz)     Temp Readings from Last 3 Encounters:   12/30/24 98.8 °F (37.1 °C) (Tympanic)   09/09/24 97.6 °F (36.4 °C)   07/22/24 98.7 °F (37.1 °C) (Tympanic)     BP Readings from Last 3 Encounters:   02/24/25 130/78   02/19/25 130/75   12/30/24 124/74     Pulse Readings from  Last 3 Encounters:   02/24/25 65   12/30/24 78   09/09/24 60       Objective:    Physical Exam  Vitals and nursing note reviewed.   Constitutional:       Appearance: He is well-developed.   HENT:      Head: Normocephalic.   Neck:      Thyroid: No thyromegaly.      Vascular: No carotid bruit or JVD.   Cardiovascular:      Rate and Rhythm: Normal rate and regular rhythm.      Pulses:           Radial pulses are 2+ on the right side and 2+ on the left side.      Heart sounds: S1 normal and S2 normal. Heart sounds not distant. No midsystolic click and no opening snap. No murmur heard.     No friction rub. No S3 or S4 sounds.   Pulmonary:      Effort: Pulmonary effort is normal.      Breath sounds: Normal breath sounds. No wheezing or rales.   Abdominal:      General: Bowel sounds are normal. There is no distension or abdominal bruit.      Palpations: Abdomen is soft.      Tenderness: There is no abdominal tenderness.   Musculoskeletal:      Cervical back: Neck supple.   Skin:     General: Skin is warm.   Neurological:      Mental Status: He is alert and oriented to person, place, and time.   Psychiatric:         Behavior: Behavior normal.         I have reviewed all pertinent labs and cardiac studies.    Component      Latest Ref Rng 8/9/2024   CRP, High Sensitivity      0.00 - 3.19 mg/L 0.65          Component      Latest Ref Rng 2/9/2024   High Sens CRP      0.00 - 3.19 mg/L 0.39            Chemistry        Component Value Date/Time     02/19/2025 0750    K 4.7 02/19/2025 0750     02/19/2025 0750    CO2 24 02/19/2025 0750    BUN 14 02/19/2025 0750    CREATININE 0.9 02/19/2025 0750    GLU 84 02/19/2025 0750        Component Value Date/Time    CALCIUM 9.8 02/19/2025 0750    ALKPHOS 81 02/19/2025 0750     (H) 02/19/2025 0750     (H) 02/19/2025 0750    BILITOT 0.6 02/19/2025 0750    ESTGFRAFRICA >60.0 06/09/2022 0819    ESTGFRAFRICA >60.0 06/09/2022 0819    EGFRNONAA >60.0 06/09/2022 0819     EGFRNONAA >60.0 06/09/2022 0819        Lab Results   Component Value Date    WBC 6.16 09/09/2024    HGB 14.1 09/09/2024    HCT 41.5 09/09/2024    MCV 97 09/09/2024     09/09/2024       Lab Results   Component Value Date    HGBA1C 5.1 09/09/2024     Lab Results   Component Value Date    CHOL 111 (L) 02/19/2025    CHOL 101 (L) 09/09/2024    CHOL 110 (L) 08/09/2024     Lab Results   Component Value Date    HDL 32 (L) 02/19/2025    HDL 33 (L) 09/09/2024    HDL 36 (L) 08/09/2024     Lab Results   Component Value Date    LDLCALC 58.6 (L) 02/19/2025    LDLCALC 53.4 (L) 09/09/2024    LDLCALC 58.2 (L) 08/09/2024     Lab Results   Component Value Date    TRIG 102 02/19/2025    TRIG 73 09/09/2024    TRIG 79 08/09/2024     Lab Results   Component Value Date    CHOLHDL 28.8 02/19/2025    CHOLHDL 32.7 09/09/2024    CHOLHDL 32.7 08/09/2024     Results for orders placed during the hospital encounter of 05/31/23    Echo    Interpretation Summary  · The left ventricle is normal in size with normal systolic function.  · The estimated ejection fraction is 65%.  · Normal left ventricular diastolic function.  · Normal right ventricular size with normal right ventricular systolic function.  · Normal central venous pressure (3 mmHg).  · The estimated PA systolic pressure is 25 mmHg.      Results for orders placed during the hospital encounter of 06/15/23    Nuclear Stress - Cardiology Interpreted    Interpretation Summary    The patient exercised for 10 minutes 2 seconds on a Hany protocol, achieving a peak heart rate of 122 bpm, which is 76 % of the age predicted maximum heart rate.    Normal myocardial perfusion scan. There is no evidence of myocardial ischemia or infarction.    The gated perfusion images showed an ejection fraction of 64% at rest. The gated perfusion images showed an ejection fraction of 70% post stress.    There is normal wall motion at rest and post stress.    LV cavity size is normal at rest and normal at  stress.    The ECG portion of the study is negative for ischemia.    The patient reported no chest pain during the stress test.    Changed to walking lexiscan due to inability to reach target HR. Dyspnea and fatigue occurred with stress- resolved during recovery. No chest pain.        Assessment:       1. Abnormal liver enzymes    2. Asymptomatic stenosis of both carotid arteries without infarction    3. AP (angina pectoris)    4. Abnormal ECG    5. Coronary artery disease of native artery of native heart with stable angina pectoris    6. Atypical chest pain    7. History of PTCA    8. Mixed hyperlipidemia    9. Palpitations    10. Essential hypertension         Plan:             Recheck CMP in 3 weeks to assess LFTs and then make further tx decisions on it.  Consider GI eval if needed.  Stable CV status on current med tx.  CAD: stable. No ischemia on June 2023 stress test.  Continue OMT for CAD.  Ranexa 500 mg bid.  Sublingual nitroglycerin 0.4 mg for concerning chest pain episodes or breakthrough angina.  Patient advised of indications, side effects of nitroglycerin and when to report to ER.  Palpitations: stable. PVCs on 6/23 Holter but rare.  Continue beta-blocker and observation.  Reviewed all tests and above medical conditions with patient in detail and formulated treatment plan.  Continue optimal medical treatment for cardiovascular conditions.  Cardiac low salt diet advised.  Daily exercise encouraged, with the goal 30 +  minutes aerobic exercise as tolerated.  Maintaining healthy weight and weight loss goals (if needed) were discussed in clinic.  HTN: Need for BP control and HTN goals (if needed) were discussed and tx plan formulated.  Goal < 130/80.  Continue current HTN meds.  Lipids:Importance of optimal lipid control were discussed in detail as well as possible pharmacologic and lifestyle changes that may be needed.  Continue statin tx.  Abnl ecg: stable. Monitor.  Carotid disease: Medical tx and f/u  carotid u/s advised.    F/u 6 months.      I have reviewed all pertinent labs and cardiac studies independently. Plans and recommendations have been formulated under my direct supervision. All questions answered and patient voiced understanding.

## 2025-03-03 DIAGNOSIS — I25.118 CORONARY ARTERY DISEASE OF NATIVE ARTERY OF NATIVE HEART WITH STABLE ANGINA PECTORIS: ICD-10-CM

## 2025-03-03 RX ORDER — AMLODIPINE BESYLATE 5 MG/1
5 TABLET ORAL
Qty: 90 TABLET | Refills: 3 | Status: SHIPPED | OUTPATIENT
Start: 2025-03-03

## 2025-03-21 ENCOUNTER — LAB VISIT (OUTPATIENT)
Dept: LAB | Facility: HOSPITAL | Age: 65
End: 2025-03-21
Attending: INTERNAL MEDICINE
Payer: COMMERCIAL

## 2025-03-21 DIAGNOSIS — R74.8 ABNORMAL LIVER ENZYMES: ICD-10-CM

## 2025-03-21 DIAGNOSIS — I25.118 CORONARY ARTERY DISEASE OF NATIVE ARTERY OF NATIVE HEART WITH STABLE ANGINA PECTORIS: ICD-10-CM

## 2025-03-21 LAB
ALBUMIN SERPL BCP-MCNC: 4.3 G/DL (ref 3.5–5.2)
ALP SERPL-CCNC: 118 U/L (ref 40–150)
ALT SERPL W/O P-5'-P-CCNC: 906 U/L (ref 10–44)
ANION GAP SERPL CALC-SCNC: 10 MMOL/L (ref 8–16)
AST SERPL-CCNC: 335 U/L (ref 10–40)
BILIRUB SERPL-MCNC: 0.8 MG/DL (ref 0.1–1)
BUN SERPL-MCNC: 15 MG/DL (ref 8–23)
CALCIUM SERPL-MCNC: 9.4 MG/DL (ref 8.7–10.5)
CHLORIDE SERPL-SCNC: 101 MMOL/L (ref 95–110)
CO2 SERPL-SCNC: 27 MMOL/L (ref 23–29)
CREAT SERPL-MCNC: 0.9 MG/DL (ref 0.5–1.4)
EST. GFR  (NO RACE VARIABLE): >60 ML/MIN/1.73 M^2
GLUCOSE SERPL-MCNC: 68 MG/DL (ref 70–110)
POTASSIUM SERPL-SCNC: 4.1 MMOL/L (ref 3.5–5.1)
PROT SERPL-MCNC: 7.2 G/DL (ref 6–8.4)
SODIUM SERPL-SCNC: 138 MMOL/L (ref 136–145)

## 2025-03-21 PROCEDURE — 36415 COLL VENOUS BLD VENIPUNCTURE: CPT | Mod: PO | Performed by: INTERNAL MEDICINE

## 2025-03-21 PROCEDURE — 80053 COMPREHEN METABOLIC PANEL: CPT | Performed by: INTERNAL MEDICINE

## 2025-03-22 ENCOUNTER — TELEPHONE (OUTPATIENT)
Dept: CARDIOLOGY | Facility: HOSPITAL | Age: 65
End: 2025-03-22
Payer: COMMERCIAL

## 2025-03-22 ENCOUNTER — RESULTS FOLLOW-UP (OUTPATIENT)
Dept: CARDIOLOGY | Facility: HOSPITAL | Age: 65
End: 2025-03-22

## 2025-03-22 DIAGNOSIS — R74.8 ABNORMAL LIVER ENZYMES: Primary | ICD-10-CM

## 2025-03-22 NOTE — TELEPHONE ENCOUNTER
Please call pt on Monday 3/24/25.  Liver enzymes still going up.  Needs GI consultation this week.    Stop Atorvastatin for safety reasons as it his hepatically metabolized.    Please make sure he gets in to see GI this coming week.    Thanks    Dr Agustin

## 2025-03-24 NOTE — TELEPHONE ENCOUNTER
Contacted PT and reviewed-Liver enzymes still going up.  Needs GI consultation this week.    Stop Atorvastatin for safety reasons as it his hepatically metabolized.     Please make sure he gets in to see GI this coming week.    PT has a GI apt on 3/25/25     PT stated verbal understanding

## 2025-03-26 ENCOUNTER — OFFICE VISIT (OUTPATIENT)
Dept: GASTROENTEROLOGY | Facility: CLINIC | Age: 65
End: 2025-03-26
Payer: COMMERCIAL

## 2025-03-26 ENCOUNTER — PATIENT MESSAGE (OUTPATIENT)
Dept: GASTROENTEROLOGY | Facility: CLINIC | Age: 65
End: 2025-03-26

## 2025-03-26 ENCOUNTER — LAB VISIT (OUTPATIENT)
Dept: LAB | Facility: HOSPITAL | Age: 65
End: 2025-03-26
Attending: INTERNAL MEDICINE
Payer: COMMERCIAL

## 2025-03-26 VITALS
SYSTOLIC BLOOD PRESSURE: 135 MMHG | HEART RATE: 70 BPM | HEIGHT: 72 IN | DIASTOLIC BLOOD PRESSURE: 84 MMHG | BODY MASS INDEX: 25.81 KG/M2 | WEIGHT: 190.56 LBS

## 2025-03-26 DIAGNOSIS — R74.8 ABNORMAL LIVER ENZYMES: Primary | ICD-10-CM

## 2025-03-26 DIAGNOSIS — R74.8 ABNORMAL LIVER ENZYMES: ICD-10-CM

## 2025-03-26 LAB
ABSOLUTE EOSINOPHIL (OHS): 0.08 K/UL
ABSOLUTE MONOCYTE (OHS): 0.41 K/UL (ref 0.3–1)
ABSOLUTE NEUTROPHIL COUNT (OHS): 2.53 K/UL (ref 1.8–7.7)
BASOPHILS # BLD AUTO: 0.05 K/UL
BASOPHILS NFR BLD AUTO: 1.1 %
CERULOPLASMIN SERPL-MCNC: 32 MG/DL (ref 15–45)
ERYTHROCYTE [DISTWIDTH] IN BLOOD BY AUTOMATED COUNT: 12 % (ref 11.5–14.5)
HAV IGM SERPL QL IA: NORMAL
HBV CORE AB SERPL QL IA: NORMAL
HBV SURFACE AB SER-ACNC: 769.89 MIU/ML
HBV SURFACE AB SERPL IA-ACNC: REACTIVE M[IU]/ML
HBV SURFACE AG SERPL QL IA: NORMAL
HCT VFR BLD AUTO: 48.2 % (ref 40–54)
HGB BLD-MCNC: 15.7 GM/DL (ref 14–18)
IMM GRANULOCYTES # BLD AUTO: 0.01 K/UL (ref 0–0.04)
IMM GRANULOCYTES NFR BLD AUTO: 0.2 % (ref 0–0.5)
INR PPP: 1 (ref 0.8–1.2)
LYMPHOCYTES # BLD AUTO: 1.29 K/UL (ref 1–4.8)
MCH RBC QN AUTO: 33 PG (ref 27–50)
MCHC RBC AUTO-ENTMCNC: 32.6 G/DL (ref 32–36)
MCV RBC AUTO: 101 FL (ref 82–98)
NUCLEATED RBC (/100WBC) (OHS): 0 /100 WBC
PLATELET # BLD AUTO: 249 K/UL (ref 150–450)
PMV BLD AUTO: 10.7 FL (ref 9.2–12.9)
PROTHROMBIN TIME: 10.8 SECONDS (ref 9–12.5)
RBC # BLD AUTO: 4.76 M/UL (ref 4.6–6.2)
RELATIVE EOSINOPHIL (OHS): 1.8 %
RELATIVE LYMPHOCYTE (OHS): 29.5 % (ref 18–48)
RELATIVE MONOCYTE (OHS): 9.4 % (ref 4–15)
RELATIVE NEUTROPHIL (OHS): 58 % (ref 38–73)
RHEUMATOID FACT SERPL-ACNC: <13 IU/ML
WBC # BLD AUTO: 4.37 K/UL (ref 3.9–12.7)

## 2025-03-26 PROCEDURE — 86696 HERPES SIMPLEX TYPE 2 TEST: CPT

## 2025-03-26 PROCEDURE — 82784 ASSAY IGA/IGD/IGG/IGM EACH: CPT

## 2025-03-26 PROCEDURE — 84165 PROTEIN E-PHORESIS SERUM: CPT | Mod: ,,, | Performed by: PATHOLOGY

## 2025-03-26 PROCEDURE — 85025 COMPLETE CBC W/AUTO DIFF WBC: CPT

## 2025-03-26 PROCEDURE — 86376 MICROSOMAL ANTIBODY EACH: CPT

## 2025-03-26 PROCEDURE — 87340 HEPATITIS B SURFACE AG IA: CPT

## 2025-03-26 PROCEDURE — 82390 ASSAY OF CERULOPLASMIN: CPT

## 2025-03-26 PROCEDURE — 86431 RHEUMATOID FACTOR QUANT: CPT

## 2025-03-26 PROCEDURE — 84165 PROTEIN E-PHORESIS SERUM: CPT

## 2025-03-26 PROCEDURE — 86704 HEP B CORE ANTIBODY TOTAL: CPT

## 2025-03-26 PROCEDURE — 86709 HEPATITIS A IGM ANTIBODY: CPT

## 2025-03-26 PROCEDURE — 86645 CMV ANTIBODY IGM: CPT

## 2025-03-26 PROCEDURE — 86015 ACTIN ANTIBODY EACH: CPT

## 2025-03-26 PROCEDURE — 85610 PROTHROMBIN TIME: CPT

## 2025-03-26 PROCEDURE — 87799 DETECT AGENT NOS DNA QUANT: CPT

## 2025-03-26 PROCEDURE — 86036 ANCA SCREEN EACH ANTIBODY: CPT

## 2025-03-26 PROCEDURE — 99999 PR PBB SHADOW E&M-EST. PATIENT-LVL V: CPT | Mod: PBBFAC,,, | Performed by: INTERNAL MEDICINE

## 2025-03-26 PROCEDURE — 36415 COLL VENOUS BLD VENIPUNCTURE: CPT

## 2025-03-26 PROCEDURE — 86706 HEP B SURFACE ANTIBODY: CPT

## 2025-03-26 NOTE — PROGRESS NOTES
[facility]  Gastroenterology    Patient evaluated at the request of Umer Agustin Md  [address] [place] [address]     PCP: Venkata Daniels MD    Chief Complaint    Elevated Hepatic Enzymes       History of present illness/subjective  Liver Dysfunction: Patient complains of an abnormal AST and ALT that has been associated with no obvious evidence for illness. The problem was first noted  several weeks ago. A positive history was noted for:  prior HCV ab + but negative HCV RNA in . A liver biopsy performed at the time of a cholecystectomy was completely normal in .  No exposure to carbamazepine, phenytoin, valproate, street drugs. Patient denies blood transfusion, use of illicit drugs, unprotected  sex, family history  of chronic liver disease, en utero exposure to hepatotrophic virus, and family history of genetic liver disease (hemochromatosis, CF, galactosemia), alpha 1 antitrypsin deficiency, autoimmune liver disease, congenital disorders, cystic fibrosis, glycogen or lipid storage disease, hemolytic anemia, hepatitis A, hepatitis B, hepatitis C, mitochondrial diseases,  jaundice, obesity, Fran's disease, or exposure to ethanol.   Mr. Fields had a significant increased in his liver enzymes recently:  Lab Results   Component Value Date     (H) 2025     (H) 2025    ALKPHOS 118 2025    BILITOT 0.8 2025       Past Medical History:   Diagnosis Date    Allergy     Anxiety     public speaking issues.    Asymptomatic stenosis of both carotid arteries without infarction 2024    Atypical chest pain 2018    Coronary artery disease 12/15/2014    dr agustin    Elevated PSA     Ex-smoker     one year at 18 y/o    False positive serological test for hepatitis C     H/O: Bell's palsy     affects left side    History of PTCA 2016    Hypertension     Meningitis     Mixed hyperlipidemia 12/15/2014       Past Surgical History:    Procedure Laterality Date    benign pros biops  01/2014    CHOLECYSTECTOMY  05/01/2013    COLONOSCOPY N/A 06/21/2023    Procedure: COLONOSCOPY;  Surgeon: Maame Miranda MD;  Location: [facility];  Service: Endoscopy;  Laterality: N/A;    FRACTURE SURGERY      HERNIA REPAIR      LIVER BIOPSY  2013    SMALL INTESTINE SURGERY      TONSILLECTOMY         Medications Ordered Prior to Encounter[1]    Review of patient's allergies indicates:   Allergen Reactions    Iodine and iodide containing products Hives    Iodine Rash       Social History[2]    Family History   Problem Relation Name Age of Onset    COPD Father      Hypertension Father      Prostate cancer Neg Hx      Melanoma Neg Hx         Vitals:    03/26/25 0912   BP: 135/84   Patient Position: Sitting   Pulse: 70   Weight: 86.4 kg (190 lb 9.4 oz)   Height: 6' (1.829 m)     Body mass index is 25.85 kg/m².    Physical Exam  Vitals and nursing note reviewed.   Constitutional:       Appearance: He is well-developed.   HENT:      Head: Normocephalic and atraumatic.   Eyes:      Conjunctiva/sclera: Conjunctivae normal.      Pupils: Pupils are equal, round, and reactive to light.   Neck:      Thyroid: No thyromegaly.      Trachea: No tracheal deviation.   Cardiovascular:      Rate and Rhythm: Normal rate and regular rhythm.   Pulmonary:      Effort: Pulmonary effort is normal.      Breath sounds: Normal breath sounds. No wheezing or rales.   Chest:      Chest wall: No tenderness.   Abdominal:      General: Bowel sounds are normal. There is no distension.      Palpations: Abdomen is soft. There is no mass.      Tenderness: There is no abdominal tenderness. There is no guarding.   Musculoskeletal:         General: Normal range of motion.      Cervical back: Normal range of motion and neck supple.   Lymphadenopathy:      Cervical: No cervical adenopathy.   Skin:     General: Skin is warm and dry.   Neurological:      Mental Status: He is alert and oriented to person,  place, and time.      Cranial Nerves: No cranial nerve deficit.   Psychiatric:         Behavior: Behavior normal.         Lab Results   Component Value Date    WBC 6.16 09/09/2024    HGB 14.1 09/09/2024    HCT 41.5 09/09/2024    MCV 97 09/09/2024     09/09/2024         BMP  Lab Results   Component Value Date     03/21/2025    K 4.1 03/21/2025     03/21/2025    CO2 27 03/21/2025    BUN 15 03/21/2025    CREATININE 0.9 03/21/2025    CALCIUM 9.4 03/21/2025    ANIONGAP 10 03/21/2025    EGFRNORACEVR >60.0 03/21/2025       Lab Results   Component Value Date     (H) 03/21/2025     (H) 03/21/2025    ALKPHOS 118 03/21/2025    BILITOT 0.8 03/21/2025       Lab Results   Component Value Date    LIPASE 38 04/09/2013       ASSESSMENT AND RECOMMENDATIONS     1. Abnormal liver enzymes  -     Ambulatory referral/consult to Gastroenterology  -     CBC auto differential; Future; Expected date: 03/26/2025  -     Alkaline phosphatase; Future; Expected date: 03/26/2025  -     Gamma GT; Future; Expected date: 03/26/2025  -     Protime-INR; Future; Expected date: 03/26/2025  -     Iron and TIBC; Future; Expected date: 03/26/2025  -     Ferritin; Future; Expected date: 03/26/2025  -     TSH; Future; Expected date: 03/26/2025  -     Anti-smooth muscle antibody; Future; Expected date: 03/26/2025  -     Anti-Liver, Kidney, Microsome Ab; Future; Expected date: 03/26/2025  -     Anti-neutrophilic cytoplasmic antibody; Future; Expected date: 03/26/2025  -     Immunoglobulins (Igg, Iga, Igm) Quantitative; Future; Expected date: 03/26/2025  -     Protein electrophoresis, serum; Future; Expected date: 03/26/2025  -     Rheumatoid factor; Future; Expected date: 03/26/2025  -     Celiac Disease Panel; Future; Expected date: 03/26/2025  -     Cytomegalovirus (Cmv) Ab, Igm; Future; Expected date: 03/26/2025  -     Veronica-Barr virus DNA, quantitative; Future; Expected date: 03/26/2025  -     Herpes simplex type 1&2  IgG,Herpes titer; Future; Expected date: 03/26/2025  -     Hepatitis B Core Antibody, Total; Future; Expected date: 03/26/2025  -     Hepatic Function Panel; Future; Expected date: 03/26/2025  -     Hepatitis B Surface Ab, Qualitative; Future; Expected date: 03/26/2025  -     Hepatitis B Surface Antigen; Future; Expected date: 03/26/2025  -     US Abdomen Limited; Future; Expected date: 03/26/2025  -     Basic Metabolic Panel; Future; Expected date: 03/26/2025  -     HEPATITIS C RNA, QUANTITATIVE, PCR; Future; Expected date: 03/26/2025  -     Ceruloplasmin; Future; Expected date: 03/26/2025  -     Lactate Dehydrogenase; Future; Expected date: 03/26/2025  -     CK; Future; Expected date: 03/26/2025  -     HEPATITIS A ANTIBODY, IGG; Future; Expected date: 03/26/2025  -     HEPATITIS A ANTIBODY, IGM; Future; Expected date: 03/26/2025    Follow up in about 4 weeks (around 4/23/2025).    The referring provider will be notified that our consultation is complete and available through the patient's records.    Thanks for letting us participate in the care of this nice patient,      Catrachito RAJI Starkey               [1]   Current Outpatient Medications on File Prior to Visit   Medication Sig Dispense Refill    amLODIPine (NORVASC) 5 MG tablet TAKE 1 TABLET BY MOUTH EVERY DAY 90 tablet 3    aspirin (ECOTRIN) 81 MG EC tablet Take by mouth. 1 Tablet, Delayed Release (E.C.) Oral Every day      clonazePAM (KLONOPIN) 0.5 MG tablet TAKE 1 TABLET BY MOUTH EVERY DAY AS NEEDED FOR ANXIETY 15 tablet 0    levalbuterol (XOPENEX HFA) 45 mcg/actuation inhaler Inhale 2 puffs into the lungs every 4 (four) hours as needed for Wheezing or Shortness of Breath. 45 g 1    metoprolol succinate (TOPROL-XL) 50 MG 24 hr tablet TAKE 1 TABLET BY MOUTH EVERY DAY 90 tablet 2    ranolazine (RANEXA) 500 MG Tb12 TAKE 1 TABLET BY MOUTH TWICE A  tablet 5    sildenafiL (VIAGRA) 100 MG tablet 1/2 to 1 tab po qd prn erectile dysfunction. 10 tablet 6     valacyclovir (VALTREX) 500 MG tablet Take 1 tablet by mouth once daily.  6    nitroGLYCERIN (NITROSTAT) 0.4 MG SL tablet Place 1 tablet (0.4 mg total) under the tongue every 5 (five) minutes as needed. 20 tablet 12    promethazine-dextromethorphan (PROMETHAZINE-DM) 6.25-15 mg/5 mL Syrp Take 5 mLs by mouth nightly as needed (Cough). (Patient not taking: Reported on 3/26/2025) 120 mL 0    [DISCONTINUED] atorvastatin (LIPITOR) 80 MG tablet TAKE 1 TABLET BY MOUTH EVERY EVENING (Patient not taking: Reported on 3/26/2025) 90 tablet 3    [DISCONTINUED] ciclopirox (LOPROX) 0.77 % Crea Apply topically 2 (two) times daily. (Patient not taking: Reported on 3/26/2025) 30 g 0     No current facility-administered medications on file prior to visit.   [2]   Social History  Socioeconomic History    Marital status:     Number of children: 1   Occupational History    Occupation: cost analyist     Employer: [employer]     Comment: [employer]   Tobacco Use    Smoking status: Former     Current packs/day: 0.00     Types: Cigarettes     Quit date: 1980     Years since quittin.2     Passive exposure: Past    Smokeless tobacco: Never   Substance and Sexual Activity    Alcohol use: Yes     Alcohol/week: 0.0 standard drinks of alcohol    Drug use: No    Sexual activity: Yes     Partners: Female   Social History Narrative    Pt works at [employer]     Social Drivers of Health     Financial Resource Strain: Unknown (2022)    Overall Financial Resource Strain (CARDIA)     Difficulty of Paying Living Expenses: Patient declined   Food Insecurity: Unknown (2022)    Hunger Vital Sign     Worried About Running Out of Food in the Last Year: Patient declined     Ran Out of Food in the Last Year: Patient declined   Transportation Needs: Unknown (2022)    PRAPARE - Transportation     Lack of Transportation (Medical): Patient declined     Lack of Transportation (Non-Medical): Patient declined   Physical Activity: Unknown  (11/28/2022)    Exercise Vital Sign     Days of Exercise per Week: Patient declined   Stress: No Stress Concern Present (10/13/2020)    [employer] - Occupational Stress Questionnaire     Feeling of Stress : Only a little   Housing Stability: Unknown (11/28/2022)    Housing Stability Vital Sign     Unable to Pay for Housing in the Last Year: Patient refused     Unstable Housing in the Last Year: Patient refused      No

## 2025-03-26 NOTE — PATIENT INSTRUCTIONS
03/26/2025    Dear Hernan Fields,    We are writing to express our heartfelt gratitude for choosing us as your healthcare provider and entrusting us with your well-being. Your health and satisfaction are our top priorities, and we are truly honored to have the opportunity to serve you.    At [facility], we strive to provide the best possible care and support for our patients. My assessment and recommendations are as follows:    Abnormal liver enzymes  -     Ambulatory referral/consult to Gastroenterology  -     CBC auto differential; Future; Expected date: 03/26/2025  -     Alkaline phosphatase; Future; Expected date: 03/26/2025  -     Gamma GT; Future; Expected date: 03/26/2025  -     Protime-INR; Future; Expected date: 03/26/2025  -     Iron and TIBC; Future; Expected date: 03/26/2025  -     Ferritin; Future; Expected date: 03/26/2025  -     TSH; Future; Expected date: 03/26/2025  -     Anti-smooth muscle antibody; Future; Expected date: 03/26/2025  -     Anti-Liver, Kidney, Microsome Ab; Future; Expected date: 03/26/2025  -     Anti-neutrophilic cytoplasmic antibody; Future; Expected date: 03/26/2025  -     Immunoglobulins (Igg, Iga, Igm) Quantitative; Future; Expected date: 03/26/2025  -     Protein electrophoresis, serum; Future; Expected date: 03/26/2025  -     Rheumatoid factor; Future; Expected date: 03/26/2025  -     Celiac Disease Panel; Future; Expected date: 03/26/2025  -     Cytomegalovirus (Cmv) Ab, Igm; Future; Expected date: 03/26/2025  -     Veronica-Barr virus DNA, quantitative; Future; Expected date: 03/26/2025  -     Herpes simplex type 1&2 IgG,Herpes titer; Future; Expected date: 03/26/2025  -     Hepatitis B Core Antibody, Total; Future; Expected date: 03/26/2025  -     Hepatic Function Panel; Future; Expected date: 03/26/2025  -     Hepatitis B Surface Ab, Qualitative; Future; Expected date: 03/26/2025  -     Hepatitis B Surface Antigen; Future; Expected date: 03/26/2025  -     US Abdomen  "Limited; Future; Expected date: 03/26/2025  -     Basic Metabolic Panel; Future; Expected date: 03/26/2025  -     HEPATITIS C RNA, QUANTITATIVE, PCR; Future; Expected date: 03/26/2025  -     Ceruloplasmin; Future; Expected date: 03/26/2025  -     Lactate Dehydrogenase; Future; Expected date: 03/26/2025  -     CK; Future; Expected date: 03/26/2025        We genuinely value your feedback and believe that it plays a crucial role in our pursuit of excellence. We kindly request you to take a few minutes of your time to share your experience with us by posting a Google review. Your honest thoughts and opinions can make a significant difference for others seeking healthcare services and will help us better understand how we can further enhance our patient care.    Your kind words and positive reviews will not only motivate our dedicated team but will also inspire confidence in potential patients who are looking for exceptional healthcare services.    Once again, we extend our sincere appreciation for giving us the opportunity to serve you. If there is anything else we can do to improve your experience or assist you further, please do not hesitate to reach out to us.    Thank you for being part of our [facility] family, and we look forward to continuing to provide you with the best care possible.    Thanks for trusting us with your healthcare needs and using [facility]. If you want to ask us a question, you can do so by replying to this message or by calling [phone].    Sincerely,    Catrachito Starkey M.D.    PS: At [facility] we offer virtual visits. Please use your [facility] to schedule a virtual visit.     To rate your experience with Dr. Starkey please click on the link below:    [email or web address]    To post a review, simply follow these quick steps:  1. Visit [facility] or search for my name on Google.  2. Click on the "Write a review" button on the left-hand " side of the page.  3. Rate your experience by choosing the number of stars that reflect your satisfaction.  4. Share your thoughts and insights about your visit - we'd love to hear your feedback!

## 2025-03-27 ENCOUNTER — RESULTS FOLLOW-UP (OUTPATIENT)
Dept: GASTROENTEROLOGY | Facility: HOSPITAL | Age: 65
End: 2025-03-27

## 2025-03-27 ENCOUNTER — HOSPITAL ENCOUNTER (OUTPATIENT)
Dept: RADIOLOGY | Facility: HOSPITAL | Age: 65
Discharge: HOME | End: 2025-03-27
Attending: INTERNAL MEDICINE
Payer: COMMERCIAL

## 2025-03-27 DIAGNOSIS — R74.8 ABNORMAL LIVER ENZYMES: ICD-10-CM

## 2025-03-27 LAB
ALBUMIN, SPE (OHS): 4.59 G/DL (ref 3.35–5.55)
ALPHA 1 GLOB (OHS): 0.31 GM/DL (ref 0.17–0.41)
ALPHA 2 GLOB (OHS): 0.69 GM/DL (ref 0.43–0.99)
BETA GLOB (OHS): 0.89 GM/DL (ref 0.5–1.1)
EPSTEIN-BARR VIRUS DNA, QUAL (OHS): NORMAL
GAMMA GLOBULIN (OHS): 1.03 GM/DL (ref 0.67–1.58)
HSV1 IGG SERPL QL IA: NEGATIVE
HSV2 IGG SERPL QL IA: POSITIVE
PROT SERPL-MCNC: 7.5 GM/DL (ref 6–8.4)
SMOOTH MUSCLE AB TITR SER IF: NORMAL {TITER}

## 2025-03-27 PROCEDURE — 76705 ECHO EXAM OF ABDOMEN: CPT | Mod: TC,PO

## 2025-03-27 PROCEDURE — 76705 ECHO EXAM OF ABDOMEN: CPT | Mod: 26,,, | Performed by: RADIOLOGY

## 2025-03-27 NOTE — PROGRESS NOTES
Mr. Fields,     Your recent ultrasound revealed mild fatty infiltration of the liver.     The potential implications of Fatty Liver (MASLD) are serious, as it can increase the risk of developing liver cancer, heart disease, and stroke. Treatment may involve lifestyle changes such as weight loss, exercise, and avoiding alcohol, as well as medication to manage underlying conditions such as diabetes or high cholesterol.    The management of Fatty Liver consists of:  -- Gradual Weight loss. A 20% weight reduction translates to improved symptoms.  -- Exercise 4 times a week for 30 minutes will mobilize some of the fat away from your liver and make your insulin more effective.  -- A plant based diet, rich in vegetables and increased water consumption, staying away from carbonated, sugary drinks.   -- Avoid processed foods.     These are the only effective measures to manage fatty liver.     Thanks for trusting us with your healthcare needs and using [facility]. If you want to ask us a question, you can do so by replying to this message or by calling [phone].    Sincerely,    Catrachito Starkey M.D.

## 2025-03-27 NOTE — PROGRESS NOTES
Subject: Test Results     03/27/2025    Dear Hernan Fields,    I hope this message finds you in good health. I am pleased to inform you that the recent tests you underwent have returned with normal and negative results.     This is great news, and it reaffirms your commitment to maintaining good health. Keep up the good work! The results also revealed that you are immune to hepatitis B.     Additionally, I want to take this opportunity to express my gratitude for choosing our healthcare services. We always strive to provide the best care possible, and your feedback is valuable to us. If you feel comfortable, I encourage you to share your experience with us by leaving a Google review. Your honest review can help others seeking healthcare services make informed decisions and inspire us to continuously improve our care.    If you have any questions or concerns about your results or anything else related to your health, please don't hesitate to reach out. We are here to support you on your health journey.    Thank you for entrusting us with your care, and we look forward to serving you in the future.    Best regards,    Catrachito Starkey  Gastroenterology Department  [facility]  [phone]

## 2025-03-28 LAB — PATHOLOGIST REVIEW - SPE (OHS): NORMAL

## 2025-03-28 NOTE — PROGRESS NOTES
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 Everything else is negative.     Thanks,     Catrachito Starkey

## 2025-04-01 ENCOUNTER — PATIENT MESSAGE (OUTPATIENT)
Dept: GASTROENTEROLOGY | Facility: CLINIC | Age: 65
End: 2025-04-01
Payer: COMMERCIAL

## 2025-04-04 ENCOUNTER — LAB VISIT (OUTPATIENT)
Dept: LAB | Facility: HOSPITAL | Age: 65
End: 2025-04-04
Attending: INTERNAL MEDICINE
Payer: COMMERCIAL

## 2025-04-04 ENCOUNTER — OFFICE VISIT (OUTPATIENT)
Dept: INTERNAL MEDICINE | Facility: CLINIC | Age: 65
End: 2025-04-04
Payer: COMMERCIAL

## 2025-04-04 ENCOUNTER — PATIENT MESSAGE (OUTPATIENT)
Dept: INTERNAL MEDICINE | Facility: CLINIC | Age: 65
End: 2025-04-04

## 2025-04-04 DIAGNOSIS — R74.8 ELEVATED LIVER ENZYMES: Primary | ICD-10-CM

## 2025-04-04 DIAGNOSIS — R74.8 ELEVATED LIVER ENZYMES: ICD-10-CM

## 2025-04-04 LAB
ALBUMIN SERPL BCP-MCNC: 4.1 G/DL (ref 3.5–5.2)
ALP SERPL-CCNC: 101 UNIT/L (ref 40–150)
ALT SERPL W/O P-5'-P-CCNC: 591 UNIT/L (ref 10–44)
ANION GAP (OHS): 9 MMOL/L (ref 8–16)
AST SERPL-CCNC: 220 UNIT/L (ref 11–45)
BILIRUB DIRECT SERPL-MCNC: 0.2 MG/DL (ref 0.1–0.3)
BILIRUB SERPL-MCNC: 0.5 MG/DL (ref 0.1–1)
BUN SERPL-MCNC: 20 MG/DL (ref 8–23)
CALCIUM SERPL-MCNC: 9.5 MG/DL (ref 8.7–10.5)
CHLORIDE SERPL-SCNC: 105 MMOL/L (ref 95–110)
CO2 SERPL-SCNC: 26 MMOL/L (ref 23–29)
CREAT SERPL-MCNC: 1 MG/DL (ref 0.5–1.4)
FERRITIN SERPL-MCNC: 507 NG/ML (ref 20–300)
GFR SERPLBLD CREATININE-BSD FMLA CKD-EPI: >60 ML/MIN/1.73/M2
GLUCOSE SERPL-MCNC: 88 MG/DL (ref 70–110)
IRON SATN MFR SERPL: 37 % (ref 20–50)
IRON SERPL-MCNC: 136 UG/DL (ref 45–160)
POTASSIUM SERPL-SCNC: 4.7 MMOL/L (ref 3.5–5.1)
PROT SERPL-MCNC: 7.2 GM/DL (ref 6–8.4)
SODIUM SERPL-SCNC: 140 MMOL/L (ref 136–145)
TIBC SERPL-MCNC: 369 UG/DL (ref 250–450)
TRANSFERRIN SERPL-MCNC: 249 MG/DL (ref 200–375)

## 2025-04-04 PROCEDURE — 82248 BILIRUBIN DIRECT: CPT

## 2025-04-04 PROCEDURE — 87522 HEPATITIS C REVRS TRNSCRPJ: CPT

## 2025-04-04 PROCEDURE — 84466 ASSAY OF TRANSFERRIN: CPT

## 2025-04-04 PROCEDURE — 1159F MED LIST DOCD IN RCRD: CPT | Mod: CPTII,95,, | Performed by: INTERNAL MEDICINE

## 2025-04-04 PROCEDURE — 98005 SYNCH AUDIO-VIDEO EST LOW 20: CPT | Mod: 95,,, | Performed by: INTERNAL MEDICINE

## 2025-04-04 PROCEDURE — 82728 ASSAY OF FERRITIN: CPT

## 2025-04-04 PROCEDURE — 36415 COLL VENOUS BLD VENIPUNCTURE: CPT | Mod: PO

## 2025-04-04 PROCEDURE — 80053 COMPREHEN METABOLIC PANEL: CPT

## 2025-04-04 NOTE — PROGRESS NOTES
Subjective:      Patient ID: Hernan Fields is a 64 y.o. male.    Chief Complaint: No chief complaint on file.    HPI  History of Present Illness               The patient location is: [place]  The chief complaint leading to consultation is: elevated live enzymes.     Visit type:  Audiovisual    Face to Face time with patient: 15   minutes of total time spent on the encounter, which includes face to face time and non-face to face time preparing to see the patient (eg, review of tests), Obtaining and/or reviewing separately obtained history, Documenting clinical information in the electronic or other health record, Independently interpreting results (not separately reported) and communicating results to the patient/family/caregiver, or Care coordination (not separately reported).         Each patient to whom he or she provides medical services by telemedicine is:  (1) informed of the relationship between the physician and patient and the respective role of any other health care provider with respect to management of the patient; and (2) notified that he or she may decline to receive medical services by telemedicine and may withdraw from such care at any time.    Notes:     Presents today to discuss elevated liver enzymes. He has been seeing GI but he is concerned has he isnt clear on their plan.     Stopped statin 2 weeks. He was on same dose of statin for over 10 years.     Review of Systems   Constitutional:  Negative for activity change and unexpected weight change.   HENT:  Negative for hearing loss, rhinorrhea and trouble swallowing.    Eyes:  Negative for discharge and visual disturbance.   Respiratory:  Negative for chest tightness and wheezing.    Cardiovascular:  Negative for chest pain and palpitations.   Gastrointestinal:  Negative for blood in stool, constipation, diarrhea and vomiting.   Endocrine: Negative for polydipsia and polyuria.   Genitourinary:  Negative for difficulty urinating, hematuria and  urgency.   Musculoskeletal:  Negative for arthralgias, joint swelling and neck pain.   Neurological:  Negative for weakness and headaches.   Psychiatric/Behavioral:  Negative for confusion and dysphoric mood.      Objective:   There were no vitals taken for this visit.    Physical Exam  Constitutional:       General: He is awake.      Appearance: Normal appearance.   HENT:      Head: Normocephalic and atraumatic.   Eyes:      Conjunctiva/sclera: Conjunctivae normal.   Pulmonary:      Effort: Pulmonary effort is normal.   Musculoskeletal:      Cervical back: Normal range of motion.   Neurological:      Mental Status: He is alert. Mental status is at baseline.   Psychiatric:         Mood and Affect: Mood normal.         Behavior: Behavior normal. Behavior is cooperative.         Thought Content: Thought content normal.         Judgment: Judgment normal.         Lab Results   Component Value Date    WBC 4.37 03/26/2025    HGB 15.7 03/26/2025    HGB 14.1 09/09/2024    HGB 15.1 05/18/2023    HCT 48.2 03/26/2025     (H) 03/26/2025    MCV 97 09/09/2024    MCV 95 05/18/2023     03/26/2025    CHOL 111 (L) 02/19/2025    TRIG 102 02/19/2025    HDL 32 (L) 02/19/2025    LDLCALC 58.6 (L) 02/19/2025    LDLCALC 53.4 (L) 09/09/2024    LDLCALC 58.2 (L) 08/09/2024     (H) 04/04/2025     (H) 04/04/2025     04/04/2025    K 4.7 04/04/2025    CALCIUM 9.5 04/04/2025     04/04/2025    CO2 26 04/04/2025    BUN 20 04/04/2025    CREATININE 1.0 04/04/2025    CREATININE 0.9 03/21/2025    CREATININE 0.9 02/19/2025    EGFRNORACEVR >60 04/04/2025    EGFRNORACEVR >60.0 03/21/2025    EGFRNORACEVR >60.0 02/19/2025    TSH 1.242 09/09/2024    TSH 1.646 05/18/2023    TSH 1.603 09/20/2022    PSA 1.1 09/09/2024    PSA 0.98 05/18/2023    PSA 1.1 09/20/2022    PSADIAG 1.0 01/09/2023    PSADIAG 1.2 06/09/2022    PSADIAG 1.0 01/20/2022    GLU 68 (L) 03/21/2025    HGBA1C 5.1 09/09/2024    HGBA1C 4.9 05/18/2023    HGBA1C  5.0 09/20/2022    EPHYOESB94RV 44 09/30/2021    CKVIIWQA17RA 38 08/19/2020    TOTALTESTOST 232 (L) 01/09/2023    TOTALTESTOST 342 06/09/2022    TOTALTESTOST 245 (L) 01/20/2022          The ASCVD Risk score (Joe PLEITEZ, et al., 2019) failed to calculate for the following reasons:    The valid total cholesterol range is 130 to 320 mg/dL     Assessment:     1. Elevated liver enzymes      Plan:   1. Elevated liver enzymes  -     Cancel: Comprehensive Metabolic Panel; Future; Expected date: 04/04/2025  -     Ferritin; Future; Expected date: 04/04/2025  -     Iron and TIBC; Future; Expected date: 04/04/2025  -     Hepatitis C RNA, Quantitative, PCR; Future; Expected date: 04/04/2025  -     Basic Metabolic Panel; Future; Expected date: 04/04/2025  -     Hepatic Function Panel; Future; Expected date: 05/04/2025        There are no Patient Instructions on file for this visit.    Future Appointments   Date Time Provider Department Center   9/8/2025  9:20 AM Umer Agustin MD [facility] CARDIO [place]           Follow up if symptoms worsen or fail to improve, for labs today in [place]. .

## 2025-04-05 DIAGNOSIS — I10 ESSENTIAL HYPERTENSION: Chronic | ICD-10-CM

## 2025-04-05 NOTE — TELEPHONE ENCOUNTER
No care due was identified.  [facility] Embedded Care Due Messages. Reference number: [id number].   4/05/2025 11:34:21 AM CDT

## 2025-04-06 RX ORDER — METOPROLOL SUCCINATE 50 MG/1
50 TABLET, EXTENDED RELEASE ORAL
Qty: 90 TABLET | Refills: 3 | Status: SHIPPED | OUTPATIENT
Start: 2025-04-06

## 2025-04-06 NOTE — TELEPHONE ENCOUNTER
Refill Decision Note   Hernan Diamond  is requesting a refill authorization.  Brief Assessment and Rationale for Refill:  Approve     Medication Therapy Plan:         Comments:     Note composed:1:30 PM 04/06/2025

## 2025-04-07 DIAGNOSIS — I25.83 CORONARY ARTERY DISEASE DUE TO LIPID RICH PLAQUE: ICD-10-CM

## 2025-04-07 DIAGNOSIS — I25.10 CORONARY ARTERY DISEASE DUE TO LIPID RICH PLAQUE: ICD-10-CM

## 2025-04-07 LAB — HCV RNA SERPL NAA+PROBE-LOG IU: NOT DETECTED {LOG_IU}/ML

## 2025-04-07 RX ORDER — ATORVASTATIN CALCIUM 80 MG/1
80 TABLET, FILM COATED ORAL NIGHTLY
Qty: 90 TABLET | Refills: 3 | OUTPATIENT
Start: 2025-04-07

## 2025-04-07 NOTE — TELEPHONE ENCOUNTER
No care due was identified.  [facility] Embedded Care Due Messages. Reference number: [id number].   4/07/2025 12:03:53 AM CDT   - - -

## 2025-04-07 NOTE — TELEPHONE ENCOUNTER
Refill Decision Note   Hernan Fields  is requesting a refill authorization.  Brief Assessment and Rationale for Refill:  Quick Discontinue     Medication Therapy Plan:  ATORVASTATIN 80MG WAS DISCONTINUED ON 03/26/25      Comments:     Note composed:6:10 AM 04/07/2025

## 2025-04-10 NOTE — PROGRESS NOTES
MrAnia iFelds,    Recent labs are essentially normal or negative. Celiac panel and rest of the tests were normal.     Thanks    Catrachito Starkey

## 2025-04-21 ENCOUNTER — RESULTS FOLLOW-UP (OUTPATIENT)
Dept: INTERNAL MEDICINE | Facility: CLINIC | Age: 65
End: 2025-04-21
Payer: COMMERCIAL

## 2025-04-21 DIAGNOSIS — R74.8 ELEVATED LIVER ENZYMES: Primary | ICD-10-CM

## 2025-04-29 ENCOUNTER — LAB VISIT (OUTPATIENT)
Dept: LAB | Facility: HOSPITAL | Age: 65
End: 2025-04-29
Attending: INTERNAL MEDICINE
Payer: COMMERCIAL

## 2025-04-29 DIAGNOSIS — R74.8 ELEVATED LIVER ENZYMES: ICD-10-CM

## 2025-04-29 LAB
ALBUMIN SERPL BCP-MCNC: 3.8 G/DL (ref 3.5–5.2)
ALP SERPL-CCNC: 77 UNIT/L (ref 40–150)
ALT SERPL W/O P-5'-P-CCNC: 313 UNIT/L (ref 10–44)
AST SERPL-CCNC: 147 UNIT/L (ref 11–45)
BILIRUB DIRECT SERPL-MCNC: 0.3 MG/DL (ref 0.1–0.3)
BILIRUB SERPL-MCNC: 0.8 MG/DL (ref 0.1–1)
CHOLEST SERPL-MCNC: 172 MG/DL (ref 120–199)
CHOLEST/HDLC SERPL: 4.9 {RATIO} (ref 2–5)
HDLC SERPL-MCNC: 35 MG/DL (ref 40–75)
HDLC SERPL: 20.3 % (ref 20–50)
LDLC SERPL CALC-MCNC: 112.8 MG/DL (ref 63–159)
NONHDLC SERPL-MCNC: 137 MG/DL
PROT SERPL-MCNC: 6.8 GM/DL (ref 6–8.4)
TRIGL SERPL-MCNC: 121 MG/DL (ref 30–150)

## 2025-04-29 PROCEDURE — 83718 ASSAY OF LIPOPROTEIN: CPT

## 2025-04-29 PROCEDURE — 82040 ASSAY OF SERUM ALBUMIN: CPT

## 2025-04-29 PROCEDURE — 36415 COLL VENOUS BLD VENIPUNCTURE: CPT | Mod: PO

## 2025-04-29 PROCEDURE — 84460 ALANINE AMINO (ALT) (SGPT): CPT

## 2025-05-04 DIAGNOSIS — I25.83 CORONARY ARTERY DISEASE DUE TO LIPID RICH PLAQUE: ICD-10-CM

## 2025-05-04 DIAGNOSIS — I25.10 CORONARY ARTERY DISEASE DUE TO LIPID RICH PLAQUE: ICD-10-CM

## 2025-05-05 RX ORDER — RANOLAZINE 500 MG/1
500 TABLET, EXTENDED RELEASE ORAL 2 TIMES DAILY
Qty: 180 TABLET | Refills: 5 | Status: SHIPPED | OUTPATIENT
Start: 2025-05-05

## 2025-05-07 ENCOUNTER — RESULTS FOLLOW-UP (OUTPATIENT)
Dept: INTERNAL MEDICINE | Facility: CLINIC | Age: 65
End: 2025-05-07

## 2025-05-07 DIAGNOSIS — R74.8 ELEVATED LIVER ENZYMES: Primary | ICD-10-CM

## 2025-06-25 ENCOUNTER — PATIENT OUTREACH (OUTPATIENT)
Dept: ADMINISTRATIVE | Facility: HOSPITAL | Age: 65
End: 2025-06-25
Payer: COMMERCIAL

## 2025-06-25 NOTE — PROGRESS NOTES
STATIN ADHERENCE: per chart review pt atorvastatin was stopped by Dr Umer Agustin due to Elevated Liver enzymes from chronic use of statins on 3.22.25.

## 2025-07-07 ENCOUNTER — PATIENT MESSAGE (OUTPATIENT)
Dept: INTERNAL MEDICINE | Facility: CLINIC | Age: 65
End: 2025-07-07
Payer: COMMERCIAL

## 2025-07-07 DIAGNOSIS — I25.118 CORONARY ARTERY DISEASE OF NATIVE ARTERY OF NATIVE HEART WITH STABLE ANGINA PECTORIS: Primary | ICD-10-CM

## 2025-07-14 ENCOUNTER — LAB VISIT (OUTPATIENT)
Dept: LAB | Facility: HOSPITAL | Age: 65
End: 2025-07-14
Attending: INTERNAL MEDICINE
Payer: COMMERCIAL

## 2025-07-14 DIAGNOSIS — I25.118 CORONARY ARTERY DISEASE OF NATIVE ARTERY OF NATIVE HEART WITH STABLE ANGINA PECTORIS: ICD-10-CM

## 2025-07-14 DIAGNOSIS — R74.8 ELEVATED LIVER ENZYMES: ICD-10-CM

## 2025-07-14 DIAGNOSIS — Z00.00 ROUTINE GENERAL MEDICAL EXAMINATION AT A HEALTH CARE FACILITY: Primary | ICD-10-CM

## 2025-07-14 LAB
ALBUMIN SERPL BCP-MCNC: 4.3 G/DL (ref 3.5–5.2)
ALP SERPL-CCNC: 60 UNIT/L (ref 40–150)
ALT SERPL W/O P-5'-P-CCNC: 134 UNIT/L (ref 10–44)
AST SERPL-CCNC: 75 UNIT/L (ref 11–45)
BILIRUB DIRECT SERPL-MCNC: 0.3 MG/DL (ref 0.1–0.3)
BILIRUB SERPL-MCNC: 0.8 MG/DL (ref 0.1–1)
CHOLEST SERPL-MCNC: 171 MG/DL (ref 120–199)
CHOLEST/HDLC SERPL: 4 {RATIO} (ref 2–5)
HDLC SERPL-MCNC: 43 MG/DL (ref 40–75)
HDLC SERPL: 25.1 % (ref 20–50)
LDLC SERPL CALC-MCNC: 107.8 MG/DL (ref 63–159)
NONHDLC SERPL-MCNC: 128 MG/DL
PROT SERPL-MCNC: 7.2 GM/DL (ref 6–8.4)
TRIGL SERPL-MCNC: 101 MG/DL (ref 30–150)

## 2025-07-14 PROCEDURE — 80061 LIPID PANEL: CPT

## 2025-07-14 PROCEDURE — 82040 ASSAY OF SERUM ALBUMIN: CPT

## 2025-07-14 PROCEDURE — 36415 COLL VENOUS BLD VENIPUNCTURE: CPT | Mod: PO

## 2025-07-26 DIAGNOSIS — N52.8 OTHER MALE ERECTILE DYSFUNCTION: ICD-10-CM

## 2025-07-26 NOTE — TELEPHONE ENCOUNTER
No care due was identified.  [facility] Embedded Care Due Messages. Reference number: [id number].   7/26/2025 9:04:03 AM CDT

## 2025-07-27 RX ORDER — SILDENAFIL 100 MG/1
TABLET, FILM COATED ORAL
Qty: 10 TABLET | Refills: 6 | Status: SHIPPED | OUTPATIENT
Start: 2025-07-27

## 2025-07-27 NOTE — TELEPHONE ENCOUNTER
Refill Decision Note   Hernan Diamond  is requesting a refill authorization.  Brief Assessment and Rationale for Refill:  Approve     Medication Therapy Plan:        Comments:     Note composed:6:50 PM 07/27/2025

## 2025-08-05 ENCOUNTER — CLINICAL SUPPORT (OUTPATIENT)
Dept: INTERNAL MEDICINE | Facility: CLINIC | Age: 65
End: 2025-08-05
Payer: COMMERCIAL

## 2025-08-05 ENCOUNTER — OFFICE VISIT (OUTPATIENT)
Dept: INTERNAL MEDICINE | Facility: CLINIC | Age: 65
End: 2025-08-05
Payer: COMMERCIAL

## 2025-08-05 ENCOUNTER — HOSPITAL ENCOUNTER (OUTPATIENT)
Dept: CARDIOLOGY | Facility: HOSPITAL | Age: 65
Discharge: HOME | End: 2025-08-05
Attending: INTERNAL MEDICINE
Payer: COMMERCIAL

## 2025-08-05 VITALS
SYSTOLIC BLOOD PRESSURE: 92 MMHG | TEMPERATURE: 98 F | HEART RATE: 61 BPM | WEIGHT: 176 LBS | BODY MASS INDEX: 23.84 KG/M2 | HEIGHT: 72 IN | DIASTOLIC BLOOD PRESSURE: 66 MMHG

## 2025-08-05 DIAGNOSIS — Z00.00 ROUTINE GENERAL MEDICAL EXAMINATION AT A HEALTH CARE FACILITY: Primary | ICD-10-CM

## 2025-08-05 DIAGNOSIS — Z23 NEED FOR STREPTOCOCCUS PNEUMONIAE VACCINATION: ICD-10-CM

## 2025-08-05 DIAGNOSIS — F41.9 ANXIETY: ICD-10-CM

## 2025-08-05 DIAGNOSIS — Z00.00 ROUTINE GENERAL MEDICAL EXAMINATION AT A HEALTH CARE FACILITY: ICD-10-CM

## 2025-08-05 DIAGNOSIS — Z78.9 STATIN INTOLERANCE: ICD-10-CM

## 2025-08-05 DIAGNOSIS — R21 RASH: ICD-10-CM

## 2025-08-05 DIAGNOSIS — Z98.61 HISTORY OF PTCA: ICD-10-CM

## 2025-08-05 DIAGNOSIS — F32.A DEPRESSION, UNSPECIFIED DEPRESSION TYPE: ICD-10-CM

## 2025-08-05 DIAGNOSIS — I10 ESSENTIAL HYPERTENSION: ICD-10-CM

## 2025-08-05 DIAGNOSIS — R74.8 ELEVATED LIVER ENZYMES: ICD-10-CM

## 2025-08-05 DIAGNOSIS — I20.9 AP (ANGINA PECTORIS): ICD-10-CM

## 2025-08-05 DIAGNOSIS — E78.5 HYPERLIPIDEMIA, UNSPECIFIED HYPERLIPIDEMIA TYPE: ICD-10-CM

## 2025-08-05 DIAGNOSIS — R74.8 ABNORMAL LIVER ENZYMES: ICD-10-CM

## 2025-08-05 DIAGNOSIS — I25.118 CORONARY ARTERY DISEASE OF NATIVE ARTERY OF NATIVE HEART WITH STABLE ANGINA PECTORIS: ICD-10-CM

## 2025-08-05 LAB
ALBUMIN SERPL BCP-MCNC: 4.2 G/DL (ref 3.5–5.2)
ALP SERPL-CCNC: 51 UNIT/L (ref 40–150)
ALT SERPL W/O P-5'-P-CCNC: 93 UNIT/L (ref 10–44)
ANION GAP (OHS): 9 MMOL/L (ref 8–16)
AST SERPL-CCNC: 69 UNIT/L (ref 11–45)
BILIRUB SERPL-MCNC: 0.7 MG/DL (ref 0.1–1)
BUN SERPL-MCNC: 12 MG/DL (ref 8–23)
CALCIUM SERPL-MCNC: 9 MG/DL (ref 8.7–10.5)
CHLORIDE SERPL-SCNC: 105 MMOL/L (ref 95–110)
CHOLEST SERPL-MCNC: 153 MG/DL (ref 120–199)
CHOLEST/HDLC SERPL: 3.6 {RATIO} (ref 2–5)
CO2 SERPL-SCNC: 29 MMOL/L (ref 23–29)
CREAT SERPL-MCNC: 0.9 MG/DL (ref 0.5–1.4)
CV STRESS BASE HR: 62 BPM
DIASTOLIC BLOOD PRESSURE: 78 MMHG
EAG (OHS): 94 MG/DL (ref 68–131)
ERYTHROCYTE [DISTWIDTH] IN BLOOD BY AUTOMATED COUNT: 12 % (ref 11.5–14.5)
GFR SERPLBLD CREATININE-BSD FMLA CKD-EPI: >60 ML/MIN/1.73/M2
GLUCOSE SERPL-MCNC: 93 MG/DL (ref 70–110)
HBA1C MFR BLD: 4.9 % (ref 4–5.6)
HCT VFR BLD AUTO: 43.9 % (ref 40–54)
HDLC SERPL-MCNC: 43 MG/DL (ref 40–75)
HDLC SERPL: 28.1 % (ref 20–50)
HGB BLD-MCNC: 14.8 GM/DL (ref 14–18)
LDLC SERPL CALC-MCNC: 92.6 MG/DL (ref 63–159)
MCH RBC QN AUTO: 34.6 PG (ref 27–31)
MCHC RBC AUTO-ENTMCNC: 33.7 G/DL (ref 32–36)
MCV RBC AUTO: 103 FL (ref 82–98)
NONHDLC SERPL-MCNC: 110 MG/DL
OHS CV CPX 85 PERCENT MAX PREDICTED HEART RATE MALE: 133
OHS CV CPX ESTIMATED METS: 11
OHS CV CPX MAX PREDICTED HEART RATE: 157
OHS CV CPX PATIENT IS FEMALE: 0
OHS CV CPX PATIENT IS MALE: 1
OHS CV CPX PEAK DIASTOLIC BLOOD PRESSURE: 79 MMHG
OHS CV CPX PEAK HEAR RATE: 146 BPM
OHS CV CPX PEAK RATE PRESSURE PRODUCT: NORMAL
OHS CV CPX PEAK SYSTOLIC BLOOD PRESSURE: 147 MMHG
OHS CV CPX PERCENT MAX PREDICTED HEART RATE ACHIEVED: 93
OHS CV CPX RATE PRESSURE PRODUCT PRESENTING: 7068
PLATELET # BLD AUTO: 223 K/UL (ref 150–450)
PMV BLD AUTO: 10.2 FL (ref 9.2–12.9)
POTASSIUM SERPL-SCNC: 4.6 MMOL/L (ref 3.5–5.1)
PROT SERPL-MCNC: 6.9 GM/DL (ref 6–8.4)
PSA SERPL-MCNC: 1.15 NG/ML
RBC # BLD AUTO: 4.28 M/UL (ref 4.6–6.2)
SODIUM SERPL-SCNC: 143 MMOL/L (ref 136–145)
STRESS ECHO POST EXERCISE DUR MIN: 9 MINUTES
STRESS ECHO POST EXERCISE DUR SEC: 24 SECONDS
SYSTOLIC BLOOD PRESSURE: 114 MMHG
TRIGL SERPL-MCNC: 87 MG/DL (ref 30–150)
TSH SERPL-ACNC: 1.13 UIU/ML (ref 0.4–4)
WBC # BLD AUTO: 3.4 K/UL (ref 3.9–12.7)

## 2025-08-05 PROCEDURE — 3078F DIAST BP <80 MM HG: CPT | Mod: CPTII,S$GLB,, | Performed by: INTERNAL MEDICINE

## 2025-08-05 PROCEDURE — 84443 ASSAY THYROID STIM HORMONE: CPT

## 2025-08-05 PROCEDURE — 99396 PREV VISIT EST AGE 40-64: CPT | Mod: S$GLB,,, | Performed by: INTERNAL MEDICINE

## 2025-08-05 PROCEDURE — 85027 COMPLETE CBC AUTOMATED: CPT

## 2025-08-05 PROCEDURE — 80061 LIPID PANEL: CPT

## 2025-08-05 PROCEDURE — 93017 CV STRESS TEST TRACING ONLY: CPT

## 2025-08-05 PROCEDURE — 99999 PR PBB SHADOW E&M-EST. PATIENT-LVL IV: CPT | Mod: PBBFAC,,, | Performed by: INTERNAL MEDICINE

## 2025-08-05 PROCEDURE — 83695 ASSAY OF LIPOPROTEIN(A): CPT

## 2025-08-05 PROCEDURE — 3074F SYST BP LT 130 MM HG: CPT | Mod: CPTII,S$GLB,, | Performed by: INTERNAL MEDICINE

## 2025-08-05 PROCEDURE — 3044F HG A1C LEVEL LT 7.0%: CPT | Mod: CPTII,S$GLB,, | Performed by: INTERNAL MEDICINE

## 2025-08-05 PROCEDURE — G0009 ADMIN PNEUMOCOCCAL VACCINE: HCPCS | Mod: S$GLB,,, | Performed by: INTERNAL MEDICINE

## 2025-08-05 PROCEDURE — 83036 HEMOGLOBIN GLYCOSYLATED A1C: CPT

## 2025-08-05 PROCEDURE — 3008F BODY MASS INDEX DOCD: CPT | Mod: CPTII,S$GLB,, | Performed by: INTERNAL MEDICINE

## 2025-08-05 PROCEDURE — 93016 CV STRESS TEST SUPVJ ONLY: CPT | Mod: ,,, | Performed by: INTERNAL MEDICINE

## 2025-08-05 PROCEDURE — 93018 CV STRESS TEST I&R ONLY: CPT | Mod: ,,, | Performed by: INTERNAL MEDICINE

## 2025-08-05 PROCEDURE — 84153 ASSAY OF PSA TOTAL: CPT

## 2025-08-05 PROCEDURE — 90677 PCV20 VACCINE IM: CPT | Mod: S$GLB,,, | Performed by: INTERNAL MEDICINE

## 2025-08-05 PROCEDURE — 80053 COMPREHEN METABOLIC PANEL: CPT

## 2025-08-05 PROCEDURE — 82172 ASSAY OF APOLIPOPROTEIN: CPT

## 2025-08-05 RX ORDER — FLUOXETINE 20 MG/1
20 CAPSULE ORAL DAILY
Qty: 90 CAPSULE | Refills: 1 | Status: SHIPPED | OUTPATIENT
Start: 2025-08-05 | End: 2026-08-05

## 2025-08-05 RX ORDER — EVOLOCUMAB 140 MG/ML
140 INJECTION, SOLUTION SUBCUTANEOUS
Qty: 6 ML | Refills: 3 | Status: ACTIVE | OUTPATIENT
Start: 2025-08-05 | End: 2026-08-05

## 2025-08-07 LAB — APO B SERPL-MCNC: 72 MG/DL

## 2025-08-08 LAB — W LP(A): 8 NMOL/L
